# Patient Record
Sex: FEMALE | Race: OTHER | HISPANIC OR LATINO | Employment: UNEMPLOYED | URBAN - METROPOLITAN AREA
[De-identification: names, ages, dates, MRNs, and addresses within clinical notes are randomized per-mention and may not be internally consistent; named-entity substitution may affect disease eponyms.]

---

## 2019-10-16 ENCOUNTER — TELEPHONE (OUTPATIENT)
Dept: NEUROLOGY | Facility: CLINIC | Age: 29
End: 2019-10-16

## 2019-10-16 NOTE — TELEPHONE ENCOUNTER
Called patient and left message regarding her appointment with Dr Scarlett Anaya  Asked patient to return my call if she has seen a previous neurologist to retrieve records from  Asked patient to arrive 15 minutes early to appointment, bring new patient questionnaire, and list of medications

## 2019-10-22 ENCOUNTER — OFFICE VISIT (OUTPATIENT)
Dept: NEUROLOGY | Facility: CLINIC | Age: 29
End: 2019-10-22

## 2019-10-22 VITALS — HEART RATE: 94 BPM | SYSTOLIC BLOOD PRESSURE: 123 MMHG | WEIGHT: 198 LBS | DIASTOLIC BLOOD PRESSURE: 76 MMHG

## 2019-10-22 DIAGNOSIS — G40.919 INTRACTABLE EPILEPSY WITHOUT STATUS EPILEPTICUS, UNSPECIFIED EPILEPSY TYPE (HCC): Primary | ICD-10-CM

## 2019-10-22 DIAGNOSIS — M54.2 NECK PAIN: ICD-10-CM

## 2019-10-22 PROBLEM — H54.61 VISION LOSS, RIGHT EYE: Status: ACTIVE | Noted: 2019-06-27

## 2019-10-22 PROBLEM — H54.61 VISION LOSS, RIGHT EYE: Status: RESOLVED | Noted: 2019-06-27 | Resolved: 2019-10-22

## 2019-10-22 PROBLEM — R00.2 PALPITATIONS: Status: ACTIVE | Noted: 2019-06-27

## 2019-10-22 PROCEDURE — 99354 PR PROLONGED SVC OUTPATIENT SETTING 1ST HOUR: CPT | Performed by: PSYCHIATRY & NEUROLOGY

## 2019-10-22 PROCEDURE — 99205 OFFICE O/P NEW HI 60 MIN: CPT | Performed by: PSYCHIATRY & NEUROLOGY

## 2019-10-22 RX ORDER — FOLIC ACID 1 MG/1
1 TABLET ORAL DAILY
COMMUNITY
Start: 2016-09-22 | End: 2019-10-22 | Stop reason: SDUPTHER

## 2019-10-22 RX ORDER — CARBAMAZEPINE 200 MG/1
400 TABLET ORAL 2 TIMES DAILY
COMMUNITY
Start: 2019-06-27 | End: 2019-10-22

## 2019-10-22 RX ORDER — PHENOBARBITAL 97.2 MG/1
97.2 TABLET ORAL
Qty: 30 TABLET | Refills: 5 | Status: SHIPPED | OUTPATIENT
Start: 2019-10-22 | End: 2020-03-10 | Stop reason: SDUPTHER

## 2019-10-22 RX ORDER — LAMOTRIGINE 100 MG/1
TABLET ORAL
Qty: 60 TABLET | Refills: 5 | Status: SHIPPED | OUTPATIENT
Start: 2019-10-22 | End: 2019-11-21 | Stop reason: SDUPTHER

## 2019-10-22 RX ORDER — LAMOTRIGINE 25 MG/1
TABLET ORAL
Qty: 182 TABLET | Refills: 0 | Status: SHIPPED | OUTPATIENT
Start: 2019-10-22 | End: 2019-11-21 | Stop reason: SDUPTHER

## 2019-10-22 RX ORDER — PHENOBARBITAL 30 MG/1
90 TABLET ORAL
COMMUNITY
Start: 2019-06-27 | End: 2019-10-22

## 2019-10-22 RX ORDER — FOLIC ACID 1 MG/1
1 TABLET ORAL DAILY
Qty: 30 TABLET | Refills: 11 | Status: SHIPPED | OUTPATIENT
Start: 2019-10-22 | End: 2020-03-10 | Stop reason: SDUPTHER

## 2019-10-22 RX ORDER — CARBAMAZEPINE 400 MG/1
800 TABLET, EXTENDED RELEASE ORAL 2 TIMES DAILY
Qty: 120 TABLET | Refills: 5 | Status: SHIPPED | OUTPATIENT
Start: 2019-10-22 | End: 2020-03-10 | Stop reason: SDUPTHER

## 2019-10-22 NOTE — PATIENT INSTRUCTIONS
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; discussed lamotrigine (Lamictal)  I reviewed side effects of Lamotrigine, which include, but not limited to, drug rash, Jacob Sinks syndrome, liver toxicity, mood swings, irritability, suicidal ideation, abnormal liver enzymes, medication interactions, ataxia, incoordination, cognitive impairment, headaches and insomnia  PLAN:  1 - continue with carbamazepine 200mg tab take 4 tabs twice a day until no more, refill is for Carbamazepine XR 400mg tabs take TWO tabs twice a day  2- continue with phenobarbital 30mg tab take 3 tabs at bedtime until no more, refill is for phenobarbital 97 2mg tab take ONE tab at bedtime  3 - start lamotrigine titration  Starting with lamotrigine 25mg tabs  Week 1 and 2 - take 1 tab daily  Week 3 and 4 - take 2 tabs daily  Week 5 and 6 - take 2 tabs twice a day  Week 7 and 8 - take 3 tabs twice a day  Week 9 and afterwards - start taking lamotrigine 100mg tab one tab twice a day  Around Week 10 go for blood work to check lamotrigine drug level, carbamazepine, phenobarbital, CBC, CMP    4 - routine EEG study  5 - MRI brain w/wo contrast seizure protocol 3T scanner  6 - follow-up in 3 months    May consider admission to the EMU for seizure characterization and depending on the EEG, pre-surgical evaluation  Possible future options of vagal nerve stimulator    FIRST AID FOR SEIZURES    What to Do If You Witness a Seizure:     Generalized convulsive (called a generalized tonic-clonic or grand mal seizure)  During this seizure, the person may cry out, suddenly stiffen up, make jerking movements, and fall  The person may turn pale or blue from difficulty breathing  Actions:  1  Stay calm  Talk in a soothing voice and if possible keep onlookers away  2  Prevent injury  Move objects away that the person might hit while jerking uncontrollably  3  Time when the seizure starts and ends   Most seizures stop after only a few minutes  4  Turn him or her gently onto one side  This will help keep the airway clear  5  Never place anything in his/her mouth or give him/her anything by mouth during a seizure  -- Do not give the person water, pills, or food until fully alert  6  Loosen tight clothing or jewelry around his/her neck  7  Make the person as comfortable as possible  8  Place something soft under their head  9  Do not hold the person down  If the person having a seizure thrashes around there is no need for you to restrain them  They are more likely to be combative if restrained  Remember to consider your safety as well  10  Keep onlookers away  11  Be sensitive and supportive, and ask others to do the same  12  Stay with the person until he/she is fully alert  Complex partial seizure (confusional spells)  During this kind of seizure, the person may have a glassy stare; give no response or inappropriate responses when questioned; sit, stand, or walk about aimlessly; make lip smacking or chewing motions; fidget with clothes; appear to be drunk, drugged, or confused  Actions:  1  Make sure the person is safe and wont harm themselves  2  Try to remove harmful objects from around the person (tools, utensils, glasses)  3  Do NOT be aggressive or attempt to restrain the person  They are more likely to be combative if restrained  Remember to consider your safety as well  4  Help prevent the person from wandering, and direct the person to chair or safe position  5  Never place anything in his/her mouth or give him/her anything by mouth during a seizure  -- Do not give the person water, pills, or food until fully alert  6  Keep onlookers away  7  Be sensitive and supportive, and ask others to do the same  8  Stay with the person until he/she is fully alert  CALL 911 if:  1  A convulsive seizure lasts more than 5 minutes  2  The person turns blue during the seizure    3  The person does not start breathing after the seizure  Begin mouth to mouth resuscitation if this would occur  4  The person has one seizure right after the other without coming back to normal consciousness between the seizures  5  The person has not regained consciousness or is still confused after 30 minutes  6  You know the person does not have epilepsy  7  You know the person has diabetes or low blood sugar  8  The person is pregnant, ill, or injured  9  The seizure occurred in water, because the person may have inhaled water  10  The person requests an ambulance or medical help  Rescue medication  Your doctor may prescribe a rescue medication such as lorazepam (Ativan), diazepam (Valium / Diastat), or clonazepam (Klonopin) to terminate a seizure or if you have a history of cluster of seizures   Follow the instructions given by your doctor for these medications    Recognizing Common Seizures (examples)   · Simple partial seizures: Isolated twitching, numbness, sweating, dizziness, nausea/vomiting, disturbances to hearing, vision, smell or taste  No loss of consciousness occurs, and the person remains aware of his/her environment  · Complex partial seizures: Staring, motionless, picking at clothes, smacking lips, swallowing repeatedly or wandering around  The person is not aware of their surroundings and is not fully responsive  · Atonic seizures: Drop attacks or sudden, rapid fall to ground with rapid recovery  · Myoclonic seizures: Brief forceful jerks which can affect the whole body or just part of it  · Absence seizures: May appear to be daydreaming or spacing out   The person is momentarily unresponsive and unaware of what is happening around him/her  · Tonic seizures: Stiffening of part or of the entire body  · Generalized Tonic-Clonic Seizures  Grand-mal seizure   Sudden loss of consciousness with body stiffening followed by continuous jerking movements   A blue tinge around the mouth is likely but lack of oxygen is rare  Loss of bladder and/or bowel control may occur  SEIZURE SAFETY    Dont let fear of seizures keep you at home  Be smart about your activities to make sure you are safe  The guidelines below can help you be as safe as possible  Make sure the people around you are aware of:   What happens when you have a seizure   Correct seizure first aid   First aid for choking (consider taking a basic life support class)   When they should know to call 911 or for medical help    Avoid common triggers of seizures:   Missing your medications   Not getting enough sleep   Drinking alcohol   Using illegal drugs    Safety measures for at home:  In the bathroom:    Do not take baths in the bathtub  Instead, take only showers  Try to have a family member available while you are in the shower   Make sure the drain in the bathtub/shower is working properly to avoid pooling of water   You can consider a fitted shower seat  Recessed soap trays can minimize injury if you would happen to fall in the shower   Bathroom doors can be hung to open outwards, so that the door can still be opened if you fall against the door   Do not lock the bathroom door  Use an Occupied sign on the door or other signal to let others know you are in the bathroom  o Safety locks can be obtained from the [facility]   On your water heater, set your water temperature to a warm temperature that is not scalding to avoid burns from very hot water   Put non-skid strips/ in the bathtub   Use an electric shaver   Avoid any electrical appliances (including electric shaver) in the bathroom or near water   Use shatterproof glass for mirrors  In the kitchen:    When possible, cook using a microwave   Only cook or use electrical appliances when someone else is in the house and available   As much as possible, grill food and avoid fryers or frying food     Use the back burners of the stove and turn the pot handles toward the back of the stove   Avoid carrying hot pans, pots of boiling water, or very hot food  Serve food or liquids directly from the stove  At the least, minimize the distance hot food is carried   Use precut foods or food processers to limit the need to use knives   Use plastic or durable cups, dishes, and containers instead of breakable glass items  In the bedroom   Low level and wide beds (like a futon) can reduce risk of injury of falling out of bed  If there is a high risk of falling out of bed, you can consider simply putting the mattress on the floor   Avoid sleeping on top bunks of bunk beds   Place a soft carpet on the floor  Around the house   Pad sharp corners of tables, chairs, etc  Round tables and furniture can be considered to avoid sharp corners   Avoid open flames  Place a screen in front of fireplaces and dont build a fire alone   Allow for open spaces with furniture   Avoid loose throw rugs or slippery floors  Non-slip abimbola or cushioned abimbola can help reduce injury from a fall   Fireproof fabrics and furniture are best, and especially important if you smoke   Doors and windows with safety glass are safer if someone falls against them   Avoid lights and heaters that could easily be knocked over   Use curling irons or clothing irons that have automatic shut off switches   Make sure motor driven equipment or lawn mowers have dead mans handles or seats so they will turn off if you release pressure  Safety measures when away from home  [address] law mandates that you cannot drive for 6 months after your most recent seizure   [place] law mandates that you cannot drive for 6 months after your most recent seizure  Work/Travel   Wear a medical alert bracelet or necklace at all times   Wear a helmet and use protective clothing/equipment when appropriate     Consider telling co-workers and travel companions that you have epilepsy and what to do if you have a seizure   Avoid irregular shifts or disruptions of a regular sleep pattern   Take your medications on time and keep an updated list of medications in your purse or wallet   Do not climb to heights or operate heavy machinery   Stand back from the edges of roads or bus/train platforms when traveling   If you wander when you have a seizure, take a friend along for the trip  Recreation  [employer] can be dangerous  Do not swim alone, in open water, or in murky water that you cannot see the bottom  o Caregivers should be with you in the pool at all times and must be physically able to get you out of the water   Use a flotation device   Scuba diving is not recommended since during a seizure people are unaware of their surroundings  o Having a seizure underwater can be deadly and can endanger the lives of others   Kayaking and canoeing can be especially dangerous  o People with epilepsy are at a higher risk of becoming trapped underneath a canoe or kayak   Wear a helmet when playing contact sports, biking, or if there is a risk of falling  o Patients with epilepsy are at a higher risk of head injury when playing contact sports   Avoid riding a bike, running, or other activities around busy roads, steep hills, or secluded areas   Exercise on soft surfaces   [employer] [place]: many people with epilepsy can go on rides depending on their type of seizures  o For some people, stress or excitement can trigger a seizure  o Rollercoasters (especially if you are upside-down) are more dangerous for people with epilepsy  Medications   Take your medications on time  If you have trouble remembering, set alarms on your phone  You can visit www  [email or web address]  com to set up reminders through text message to help you remember to take your medications   Use a pillbox to help you keep track of your medications     When out of the house, take any needed medications with you  Consider keeping one or two extra doses with you in case you are unexpectedly away from home longer than planned   If you realize you missed a dose of your medications and it is less than 2 hours until your next dose, skip the missed dose  Do not double up your medication dose  If it is more than 2 hours until your next dose, you can take the missed dose   Avoid drinking alcohol, since this can enhance effects of your seizure medications   Be aware of common and major side effects of your medications   Keep your medications out of the reach of children  Parenting:  Destini Gamez your home as much as possible   It is possible that you could drop your baby if you have a seizure while holding or feeding them   If you are nursing a baby, sit on the floor or bed with your back supported so the baby will not fall far if you should lose consciousness   Feed the baby while he or she is seated in an infant seat   Dress, change, and sponge bathe the baby on the floor   Move the baby around in a stroller or small crib   Keep a young baby in a playpen when you are alone, and a toddler in an indoor play yard, or childproof one room and use safety sandoval at the doors   When out of the house, use a bungee-type cord or restraint harness so your child cannot wander away if you have a seizure that affects your awareness

## 2019-10-22 NOTE — PROGRESS NOTES
Review of Systems    {[id number]}      Review of Systems   Constitutional: Negative  Negative for appetite change and fever  HENT: Negative  Negative for hearing loss, tinnitus, trouble swallowing and voice change  Eyes: Negative  Negative for photophobia and pain  Respiratory: Negative  Negative for shortness of breath  Cardiovascular: Negative  Negative for palpitations  Gastrointestinal: Negative  Negative for nausea and vomiting  Endocrine: Negative  Negative for cold intolerance and heat intolerance  Genitourinary: Negative  Negative for dysuria, frequency and urgency  Musculoskeletal: Negative  Negative for myalgias and neck pain  Skin: Negative  Negative for rash  Neurological: Positive for seizures and headaches  Negative for dizziness, tremors, syncope, facial asymmetry, speech difficulty, weakness, light-headedness and numbness  Hematological: Negative  Does not bruise/bleed easily  Psychiatric/Behavioral: Positive for confusion (after seizures)  Negative for hallucinations and sleep disturbance

## 2019-10-22 NOTE — PROGRESS NOTES
[address] [facility]  Patient's Name: Orly Monge   Patient's : 1990   Visit Type: new patient  Referring MD / PCP:  No primary care provider on file  Assessment:  Ms Orly Monge is a 34 y o  woman with intractable epilepsy, as she has failed more than 2 AEDs and continues to have seizures  Her MRI brain study from a couple of years ago did not identify a focal lesion to cause her seizures  Her EEG reports are somewhat confusing, as there were both features of generalized epileptiform discharges and focal discharges  It may be possible that the focal discharges were fragments of the generalized discharges or that her seizure focus is frontal and around that area it is difficult to differentiate from generalized discharges  I reviewed the diagnosis of epilepsy with the patient and her sister, that further testing is needed such as re-imaging of the brain for focal lesions with a 3T MRI scanner (which may help identify subtle cortical dysplasia)  I will request a routine EEG study  But, it may not be as informative as to localizing the source of her seizures  To better determine if her clinical seizures are focal onset or generalized onset seizures, an inpatient video EEG monitoring study (EMU) may be needed  This would help guide further treatment recommendations  I reviewed that intractable epilepsy means that chances of seizure freedom with medication trials become less than 10%  We may need to consider alternative therapy such as neuromodulation (VNS, RNS, DBS) or if we are able to determine a seizure focus, then epilepsy surgery  However, with the finding of generalized epileptiform discharges epilepsy surgery is not a possibility  At this time, I did recommend that she try another antiepileptic medication, starting with lamotrigine, which is a broad spectrum anti-epileptic medication    In contrast carbamazepine is for focal epilepsy and may worsen generalized epilepsy  Once she is at an adequate dose of lamotrigine, we will wean her off of carbamazepine  I reviewed side effects of lamotrigien, which include, but not limited to, drug rash, Hosea Feather syndrome, liver toxicity, mood swings, irritability, suicidal ideation, abnormal liver enzymes, medication interactions, ataxia, incoordination, cognitive impairment, headaches and insomnia  Her sister asked about patient's employment  I will refer the patient to our  for assistance in finding community resources for employment  With Ms Perlita Jade's type of seizures when she has altered awareness and drops things from her hands, working in a [employer] or [employer]  may not be an ideal place for her safety  She will need to be in a more structured environment  I also suspect that there is come intellectual disability as part of her epilepsy, she will need a place that is willing to tolerate some slow cognitive function  She needs to find a place of employment where she is not carrying fragile objects for long distances, using motorized equipment, or use a ladder/heights  Plan:   1 - continue with carbamazepine 200mg tab take 4 tabs twice a day until no more, refill is for Carbamazepine XR 400mg tabs take TWO tabs twice a day  2- continue with phenobarbital 30mg tab take 3 tabs at bedtime until no more, refill is for phenobarbital 97 2mg tab take ONE tab at bedtime  3 - start lamotrigine titration  Starting with lamotrigine 25mg tabs  Week 1 and 2 - take 1 tab daily  Week 3 and 4 - take 2 tabs daily  Week 5 and 6 - take 2 tabs twice a day  Week 7 and 8 - take 3 tabs twice a day  Week 9 and afterwards - start taking lamotrigine 100mg tab one tab twice a day      Around Week 10 go for blood work to check lamotrigine drug level, carbamazepine, phenobarbital, CBC, CMP    4 - routine EEG study  5 - MRI brain w/wo contrast seizure protocol 3T scanner  6 - follow-up in 3 months    May consider admission to the EMU for seizure characterization and depending on the EEG, pre-surgical evaluation  Possible future options of vagal nerve stimulator  Will need to address vitamin D, osteopenia, and pregnancy concerns at the next office visit  Problem List Items Addressed This Visit        Nervous and Auditory    Intractable epilepsy without status epilepticus ([facility] [address] ) - Primary    Relevant Medications    folic acid (FOLVITE) 1 mg tablet    PHENobarbital (LUMINAL) 97 2 MG tablet    carBAMazepine (TEGretol XR) 400 mg 12 hr tablet    lamoTRIgine (LaMICtal) 25 mg tablet    lamoTRIgine (LaMICtal) 100 mg tablet    Other Relevant Orders    EEG awake or drowsy routine    MRI brain seizure wo and w contrast    Lamotrigine level    Phenobarbital level    Carbamazepine level, total    CBC    Comprehensive metabolic panel       Other    Neck pain          Chief Complaint:   Chief Complaint   Patient presents with    Seizures      HPI:      Perlita Bowles is a 34 y o  right handed female here for new patient evaluation of intractable epilepsy  Intake History 10/22/2019  She was previously seen by Dr Francine Mock at [facility]  From Dr Sandoval Pineda office note:  Seizure type 1 - behavioral arrest, tremoulous, unresponsive, amnestic to seizure, ?masticatory movements, blowing air type of sounds  Seizure type 2 - generalized tonic clonic seizure out of sleep  Patient is from the [facility]  She was diagnosed with seizures at an early age in childhood; initially there were staring episodes; mother was unable to report name of medications that were tried  She had generalized convulsions starting at the age of 25  She was initially on Tegretol  Her last visit with Dr Francine Mock was on 6/27/2019  She continued to have "a few attacks"  After a seizure she will have a headache      Patient's history: obtained using  by telephone [phone] is here with her sister  I started to have Perlita give her own history regarding seizures but she was struggling with the telephone  (not sure how to respond to questions)  She looked to her sister for information  Her sister reports that Ivan started to have seizures about 11 years ago (age 15-16, not during early childhood)  Ivan has no recollection of when she has a seizure  She did not have seizures as a baby  When she was a child she was "slow" ( used "hidden condition")  When she was born, there was a problem during birth, the doctor was pushing or pulling during the delivery and there was something in the brain that got "disconnected"  Her sister reports that Perlita seizures involve Perlita appearing paralyzed, unable to speak, with hand tremors, rigid and stiff, and forgets what she is doing  If she has something in her hands she just drops it  This seizure can be about a minute and may repeat 3 times within 5 minutes  Ivan has a warning of making a blowing sound with her mouth  Her last seizure was last night  She has seizures about a couple of times a week  There was one seizure that caused her to a convulsion (this happened 7 years ago)  She started a new job last week, she could not work because she had a seizure there  She could not hold a job because she had seizures at work  AED/side effects/compliance:  Carbamazepine 800-800  Phenobarbital 90mg at bedtime    Event/Seizure semiology:  1  She starts to make a blowing sound, rigid, behavioral arrest, unable to speak, hand tremors, drops things from hands, then she cannot remember what happened (amnestic to seizure)  2  One lifetime convulsion in 2012?     Woman of childbearing age with Epilepsy:  Contraception: not sexually active  Folic acid supplement:  No    Prior Epilepsy History:  x    Special Features  Status epilepticus: No  Self Injury Seizures: No  Precipitating Factors: None  Post-ictal state: amnestic, confusion    Epilepsy Risk Factors:  Abnormal pregnancy: No  Abnormal birth/: No  Abnormal Development: No  Febrile seizures, simple: No  Febrile seizures, complex: No  CNS infection: No  Mental retardation: No  Cerebral palsy: No  Head injury (moderate/severe): No  CNS neoplasm: No  CNS malformation: No  Neurosurgical procedure: No  Stroke: No  Alcohol abuse: No  Drug abuse: No  Family history Sz/epilepsy: No    Prior AEDs:  medication Max dose Time used Reason to stop   Levetiracetam   ?behavioral problem   phenobarbital      carbamazepine      oxcarbazepine              Prior workup:  x  Imagin2016  MRI brain - [facility]  Normal MR of brain     EEGs:  2015 - [facility]  Multifocal independent spike-slow waves with phase reversal over the left temporal region T3, T5, Fp1, Fp2, F3 along with generalized spike-slow waves at 1 Hz runs    There are small sharp waves in the bilateral frontal region    -2016 - [facility]  48 hours ambulatory EEG  Generalized spike and waves and polyspike and waves  There is an electrographic seizure but no symptoms were reported  2016 - 11:17 - generalized bifrontal spike-wave complexes with generalized 7-8 Hz activity    Labs:  2018  CBC 4 3/14 2/42/345  /4 8/111/24/9/0 5/79  LFT 7 3/3 6/0 2/16/21/134    General exam   /76 (BP Location: Right arm, Patient Position: Sitting, Cuff Size: Large)   Pulse 94   Wt 89 8 kg (198 lb)    Appearance: normally developed, appears well; but she has difficulty understanding  questions on the phone; her sister frequently had to speak for Perlita  Carotids: no bruits present  Cardiovascular: regular rate and rhythm and normal heart sounds  Pulmonary: clear to auscultation  Abdominal: obese, nontender, nondistended    HEENT: anicteric and moist mucus membranes / oral cavity   Fundoscopy: unable to see the posterior elements of the orbit    Mental status  Orientation: alert and oriented to name,  2019, [employer] [employer], unable to say the name of this this place, 2nd floor  Fund of Knowledge: [place] Porter, concrete answers - what does your parents do? "Work"; generic answers to parents type of employment   Attention and Concentration: [address]; able to name the months backwards  Current and Remote Memory:recalled 2/3 words after five minutes  Language: difficulty with naming parts of a shirt, comprehension is intact, repetition is intact and naming is intact    Cranial Nerves  CN 1: not tested  CN 2: Visual fields intact to confrontation and pupils equal round reactive to direct and consenual light   CN 3, 4, 6: EOMI, no nystagmus  CN 5:sensation intact to all distriubtion V1, V2, V3  CN 7:muscles of facial expression are symmetric  CN 8:symmetric to finger rubs bilaterally  CN 9, 10:symmetric elevation of soft palate and uvula is midline  CN 11:symmetric strength of sternocleidomastoid and trapezius muscles  CN 12:tongue is midline    Motor:  Bulk, Tone: normal bulk, normal tone  Pronation: no pronator drift  Strength: Patient has full strength symmetrically of shoulder abduction, biceps, triceps, wrist flexion, wrist extension, finger flexion, finger abduction, hip flexion, knee flexion, knee extension, dorsiflexion, plantar flexion  Sensory:  Lighttouch: intact in all limbs  Romberg:normal    Coordination:  FNF:FNF bilaterally intact  PAULINO:intact  FFM:intact  Gait/Station:normal gait and normal tandem gait    Reflexes:  reflexes are normal and symmetric    Past Medical/Surgical History:  Patient Active Problem List   Diagnosis    Body mass index (BMI) of 33 0 to 33 9 in adult    Dysthymic disorder    Neck pain    Palpitations    Intractable epilepsy without status epilepticus ([facility] [address] )     Past Medical History:   Diagnosis Date    Seizures ([facility] [address] )      History reviewed  No pertinent surgical history      Past Psychiatric History:  Depression: Yes  Anxiety: No  Psychosis: No    Medications:    Current Outpatient Medications:     folic acid (FOLVITE) 1 mg tablet, Take 1 tablet (1 mg total) by mouth daily, Disp: 30 tablet, Rfl: 11    carBAMazepine (TEGretol XR) 400 mg 12 hr tablet, Take 2 tablets (800 mg total) by mouth 2 (two) times a day, Disp: 120 tablet, Rfl: 5    lamoTRIgine (LaMICtal) 100 mg tablet, After titration with 25mg tabs, take one 100mg tab twice a day, Disp: 60 tablet, Rfl: 5    lamoTRIgine (LaMICtal) 25 mg tablet, Week 1 & 2 take 1 tab daily, Week 3 & 4 take 2 tabs daily, then follow MD's titration instructions, Disp: 182 tablet, Rfl: 0    PHENobarbital (LUMINAL) 97 2 MG tablet, Take 1 tablet (97 2 mg total) by mouth daily at bedtime, Disp: 30 tablet, Rfl: 5    Allergies:  No Known Allergies    Family history:  Family History   Problem Relation Age of Onset    Hypertension Maternal Grandmother     Colon polyps Mother     Glaucoma Mother     Asthma Sister      There is no family history of seizure, epilepsy or developmental delay  Social History  Living situation:  Lives with family  Work:  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, 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  Driving:  No   reports that she has never smoked  She has never used smokeless tobacco  She reports that she does not drink alcohol or use drugs  Review of Systems  A review of at least 12 organ/systems was obtained by the medical assistant and reviewed by me, including additional positives/negatives:  Neurological: Positive for seizures and headaches  Negative for dizziness, tremors, syncope, facial asymmetry, speech difficulty, weakness, light-headedness and numbness  Psychiatric/Behavioral: Positive for confusion (after seizures)  Negative for hallucinations and sleep disturbance       Decision making was of high-complexity due to the patient's high risk condition (seizures), psychiatric and neuropsychological comorbidities, behavioral problems, memory and cognitive problems and medication side effects  Prolonged service  I spent an additional 30 minutes to address issues of management of epilepsy/loss of consciousness, psychiatric comorbidities, medication instructions, and monitoring (adverse effects, side effects, and risk of antiepileptic medications)    Started at 9:57AM   Ended at 10:27AM

## 2019-10-23 ENCOUNTER — TELEPHONE (OUTPATIENT)
Dept: NEUROLOGY | Facility: CLINIC | Age: 29
End: 2019-10-23

## 2019-10-23 NOTE — TELEPHONE ENCOUNTER
----- Message from Misty Jarvis MD sent at 10/22/2019  5:23 PM EDT -----  Regarding: employment for patients with seizures  Can you help this patient out regarding how to find appropriate type of employment such as referring to [employer]? She has frequent complex partial type of seizures, only one convulsion in her lifetime  She was trying to work at [employer], which is not an appropriate place for her  I tried to suggest options of lower risk type of jobs that does not involve lifting objects over head, not carrying things over a long distance, no motorized equipment, no ladders, and less traffic  Options I suggested are repetitive type of work, correction, cleaning, guarding  She does not speak [place] and I think she has some mild intellectual disability, but she reports that she completed high school

## 2019-10-28 ENCOUNTER — DOCUMENTATION (OUTPATIENT)
Dept: NEUROLOGY | Facility: CLINIC | Age: 29
End: 2019-10-28

## 2019-11-08 ENCOUNTER — HOSPITAL ENCOUNTER (OUTPATIENT)
Dept: RADIOLOGY | Age: 29
Discharge: HOME | End: 2019-11-08

## 2019-11-08 DIAGNOSIS — G40.919 INTRACTABLE EPILEPSY WITHOUT STATUS EPILEPTICUS, UNSPECIFIED EPILEPSY TYPE (HCC): ICD-10-CM

## 2019-11-08 PROCEDURE — A9585 GADOBUTROL INJECTION: HCPCS | Performed by: PSYCHIATRY & NEUROLOGY

## 2019-11-08 PROCEDURE — 70553 MRI BRAIN STEM W/O & W/DYE: CPT

## 2019-11-08 RX ADMIN — GADOBUTROL 9 ML: 604.72 INJECTION INTRAVENOUS at 09:28

## 2019-11-11 DIAGNOSIS — G40.919 INTRACTABLE EPILEPSY WITHOUT STATUS EPILEPTICUS, UNSPECIFIED EPILEPSY TYPE (HCC): ICD-10-CM

## 2019-11-11 NOTE — TELEPHONE ENCOUNTER
Called and spoke to patient's sister  She has no questions or concerns regarding the MRI Brain results  She would like to know if you could resend [facility] [address] [facility] instead of [facility] because [facility] believes [facility]  Please advise

## 2019-11-11 NOTE — TELEPHONE ENCOUNTER
----- Message from Shane Rust MD sent at 11/11/2019  1:28 PM EST -----  MRI brain study is normal, no developmental abnormality, injury, or tumor to cause seizures is found  May let the patient know

## 2019-11-11 NOTE — TELEPHONE ENCOUNTER
Does she need scripts for both lamotrigine 25mg and 100mg tabs? Did she start the lamotrigine 25mg tab titration?

## 2019-11-19 NOTE — TELEPHONE ENCOUNTER
MSW attempted to reach patient via the Kita Patterson,  # [phone]  No answer   left message requesting callback  Awaiting same  MSW did phone [facility] at [phone] to inquire if they had any [place] brochures or information on their services online  MSW was told that there are no [place] resources available online, but that they can mail patients applications and brochures directly that are in [place]

## 2019-11-20 NOTE — TELEPHONE ENCOUNTER
MSW attempted to reach patient via the Kaia Craig,  # [phone]  No answer   left message requesting callback  Awaiting same

## 2019-11-20 NOTE — TELEPHONE ENCOUNTER
MSW received callback from patient's sister, Azra Valente (who is listed on patient's Communication Consent)  MSW conferenced in the [facility], and communicated via  # [phone]  MSW explained services offered by the [facility] ([facility]) to patient's sister - that this agency helps persons with disabilities prepare for, obtain, or maintain employment  MSW advised that patients are assigned to an vocational rehabilitation counselor who works with the individual to help them find gainful, meaningful employment  MSW explained that services may include diagnostic services, vocational evaluation, counseling, training, placements assistance, assistive technology, support services, etc     Patient's sister reported that patient is Tongan speaking  MSW did advise that [facility] would still be able to help her  MSW advised that this writer could request [facility] to mail patient a [place] brochure about their services and a [place] application  Patient's sister agreeable to have above mailed to her sister's address  Address verified  MSW did advise that if patient was interested in [address] [facility] that she would need to complete the application and submit it to [facility] in order to be assigned to a vocational rehab counselor  MSW phoned the [facility] office of [employer] [facility] at [phone] and spoke with Akhil Sorensen stated that she would mail patient a [place] brochure and application  Patient's name and address provided  Akhil Sorensen stated that she would place the documents in the mail to patient this date  MSW will be available to patient/sister as needed

## 2019-11-21 RX ORDER — LAMOTRIGINE 25 MG/1
TABLET ORAL
Qty: 182 TABLET | Refills: 0 | Status: SHIPPED | OUTPATIENT
Start: 2019-11-21 | End: 2020-03-10 | Stop reason: SDUPTHER

## 2019-11-21 RX ORDER — LAMOTRIGINE 100 MG/1
TABLET ORAL
Qty: 60 TABLET | Refills: 5 | Status: SHIPPED | OUTPATIENT
Start: 2019-11-21 | End: 2020-03-10 | Stop reason: SDUPTHER

## 2019-11-21 NOTE — TELEPHONE ENCOUNTER
Patient's sister calling back  She states patient has not started lamotrigine titration yet  She states she would like both [facility]  Please review and sign, thanks!

## 2020-02-04 ENCOUNTER — TELEPHONE (OUTPATIENT)
Dept: NEUROLOGY | Facility: CLINIC | Age: 30
End: 2020-02-04

## 2020-02-04 NOTE — TELEPHONE ENCOUNTER
Called patient regarding missing blood work results  Asked patient to complete her blood work prior to appointment with Dr Adela Rausch  Advised to call back if she did complete it elsewhere to request results and if she has any questions

## 2020-02-06 ENCOUNTER — TELEPHONE (OUTPATIENT)
Dept: NEUROLOGY | Facility: CLINIC | Age: 30
End: 2020-02-06

## 2020-02-06 NOTE — TELEPHONE ENCOUNTER
2/6/2020 AT 9:34AM SPOKE W/RYAN  VIOLET ( # [id number]) LMOM FOR PT TO CALL BACK W/NEW INS   INFORMATION- NO INS CURRENTLY LISTED IS EPIC FOR PT

## 2020-03-05 ENCOUNTER — HOSPITAL ENCOUNTER (OUTPATIENT)
Dept: NEUROLOGY | Facility: CLINIC | Age: 30
Discharge: HOME | End: 2020-03-05
Payer: COMMERCIAL

## 2020-03-05 DIAGNOSIS — G40.919 INTRACTABLE EPILEPSY WITHOUT STATUS EPILEPTICUS, UNSPECIFIED EPILEPSY TYPE (HCC): ICD-10-CM

## 2020-03-05 PROCEDURE — 95816 EEG AWAKE AND DROWSY: CPT

## 2020-03-05 PROCEDURE — 95819 EEG AWAKE AND ASLEEP: CPT | Performed by: PSYCHIATRY & NEUROLOGY

## 2020-03-06 ENCOUNTER — TELEPHONE (OUTPATIENT)
Dept: NEUROLOGY | Facility: CLINIC | Age: 30
End: 2020-03-06

## 2020-03-06 NOTE — TELEPHONE ENCOUNTER
----- Message from Margaret Hinson MD sent at 3/6/2020 10:19 AM EST -----  May inform the patient's caretakers that her EEG study shows abnormality in the left temporal and left frontal region that may indicate an area of her brain that is causing her to have seizures  There was no structural abnormality on the MRI brain study to indicate what is the actual cause  Since the last office visit, how often has Dois Prime been having seizures?

## 2020-03-06 NOTE — TELEPHONE ENCOUNTER
Spoke with patient's sister Billie Hood and made her aware  She asked that we call caregiver Caroline ([phone]) to confirm seizure frequency  Billie Hood states she knows the patient had a seizure 3 days ago  Left message for Caroline to return the call

## 2020-03-10 ENCOUNTER — APPOINTMENT (OUTPATIENT)
Dept: LAB | Facility: HOSPITAL | Age: 30
End: 2020-03-10
Attending: PSYCHIATRY & NEUROLOGY
Payer: COMMERCIAL

## 2020-03-10 ENCOUNTER — OFFICE VISIT (OUTPATIENT)
Dept: NEUROLOGY | Facility: CLINIC | Age: 30
End: 2020-03-10
Payer: COMMERCIAL

## 2020-03-10 ENCOUNTER — TELEPHONE (OUTPATIENT)
Dept: NEUROLOGY | Facility: CLINIC | Age: 30
End: 2020-03-10

## 2020-03-10 VITALS — SYSTOLIC BLOOD PRESSURE: 135 MMHG | WEIGHT: 206 LBS | HEART RATE: 97 BPM | DIASTOLIC BLOOD PRESSURE: 91 MMHG

## 2020-03-10 DIAGNOSIS — M83.5 ANTICONVULSANT DRUG-INDUCED OSTEOMALACIA: ICD-10-CM

## 2020-03-10 DIAGNOSIS — G40.919 INTRACTABLE EPILEPSY WITHOUT STATUS EPILEPTICUS, UNSPECIFIED EPILEPSY TYPE (HCC): ICD-10-CM

## 2020-03-10 DIAGNOSIS — T42.75XA ANTICONVULSANT DRUG-INDUCED OSTEOMALACIA: ICD-10-CM

## 2020-03-10 DIAGNOSIS — G40.919 INTRACTABLE EPILEPSY WITHOUT STATUS EPILEPTICUS, UNSPECIFIED EPILEPSY TYPE (HCC): Primary | ICD-10-CM

## 2020-03-10 LAB
ALBUMIN SERPL BCP-MCNC: 3.5 G/DL (ref 3.5–5)
ALP SERPL-CCNC: 111 U/L (ref 46–116)
ALT SERPL W P-5'-P-CCNC: 24 U/L (ref 12–78)
ANION GAP SERPL CALCULATED.3IONS-SCNC: 7 MMOL/L (ref 4–13)
AST SERPL W P-5'-P-CCNC: 16 U/L (ref 5–45)
BILIRUB SERPL-MCNC: 0.24 MG/DL (ref 0.2–1)
BUN SERPL-MCNC: 6 MG/DL (ref 5–25)
CALCIUM SERPL-MCNC: 8.6 MG/DL (ref 8.3–10.1)
CARBAMAZEPINE SERPL-MCNC: 8.5 UG/ML (ref 4–12)
CHLORIDE SERPL-SCNC: 104 MMOL/L (ref 100–108)
CO2 SERPL-SCNC: 27 MMOL/L (ref 21–32)
CREAT SERPL-MCNC: 0.56 MG/DL (ref 0.6–1.3)
ERYTHROCYTE [DISTWIDTH] IN BLOOD BY AUTOMATED COUNT: 12.4 % (ref 11.6–15.1)
GFR SERPL CREATININE-BSD FRML MDRD: 128 ML/MIN/1.73SQ M
GLUCOSE P FAST SERPL-MCNC: 90 MG/DL (ref 65–99)
HCT VFR BLD AUTO: 42.7 % (ref 34.8–46.1)
HGB BLD-MCNC: 13.7 G/DL (ref 11.5–15.4)
MCH RBC QN AUTO: 30.7 PG (ref 26.8–34.3)
MCHC RBC AUTO-ENTMCNC: 32.1 G/DL (ref 31.4–37.4)
MCV RBC AUTO: 96 FL (ref 82–98)
PHENOBARB SERPL-MCNC: 17.4 UG/ML (ref 15–40)
PLATELET # BLD AUTO: 317 THOUSANDS/UL (ref 149–390)
PMV BLD AUTO: 9.4 FL (ref 8.9–12.7)
POTASSIUM SERPL-SCNC: 3.9 MMOL/L (ref 3.5–5.3)
PROT SERPL-MCNC: 7.3 G/DL (ref 6.4–8.2)
RBC # BLD AUTO: 4.46 MILLION/UL (ref 3.81–5.12)
SODIUM SERPL-SCNC: 138 MMOL/L (ref 136–145)
WBC # BLD AUTO: 3.92 THOUSAND/UL (ref 4.31–10.16)

## 2020-03-10 PROCEDURE — 80156 ASSAY CARBAMAZEPINE TOTAL: CPT

## 2020-03-10 PROCEDURE — 80053 COMPREHEN METABOLIC PANEL: CPT

## 2020-03-10 PROCEDURE — 80175 DRUG SCREEN QUAN LAMOTRIGINE: CPT

## 2020-03-10 PROCEDURE — 85027 COMPLETE CBC AUTOMATED: CPT

## 2020-03-10 PROCEDURE — 36415 COLL VENOUS BLD VENIPUNCTURE: CPT

## 2020-03-10 PROCEDURE — 80184 ASSAY OF PHENOBARBITAL: CPT

## 2020-03-10 PROCEDURE — 99215 OFFICE O/P EST HI 40 MIN: CPT | Performed by: PSYCHIATRY & NEUROLOGY

## 2020-03-10 RX ORDER — LAMOTRIGINE 25 MG/1
TABLET ORAL
Qty: 182 TABLET | Refills: 0 | Status: SHIPPED | OUTPATIENT
Start: 2020-03-10 | End: 2020-09-21

## 2020-03-10 RX ORDER — LAMOTRIGINE 100 MG/1
TABLET ORAL
Qty: 60 TABLET | Refills: 5 | Status: SHIPPED | OUTPATIENT
Start: 2020-03-10 | End: 2020-09-21

## 2020-03-10 RX ORDER — FOLIC ACID 1 MG/1
1 TABLET ORAL DAILY
Qty: 30 TABLET | Refills: 11 | Status: SHIPPED | OUTPATIENT
Start: 2020-03-10 | End: 2020-12-21 | Stop reason: SDUPTHER

## 2020-03-10 RX ORDER — PHENOBARBITAL 97.2 MG/1
97.2 TABLET ORAL
Qty: 30 TABLET | Refills: 5 | Status: SHIPPED | OUTPATIENT
Start: 2020-03-10 | End: 2020-09-21 | Stop reason: SDUPTHER

## 2020-03-10 RX ORDER — CARBAMAZEPINE 400 MG/1
800 TABLET, EXTENDED RELEASE ORAL 2 TIMES DAILY
Qty: 120 TABLET | Refills: 5 | Status: SHIPPED | OUTPATIENT
Start: 2020-03-10 | End: 2020-09-21

## 2020-03-10 NOTE — PATIENT INSTRUCTIONS
At this time, I did recommend that she try another antiepileptic medication, starting with lamotrigine, which is a broad spectrum anti-epileptic medication  In contrast carbamazepine is for focal epilepsy and may worsen generalized epilepsy  Once she is at an adequate dose of lamotrigine, we will wean her off of carbamazepine  I reviewed side effects of lamotrigien, which include, but not limited to, drug rash, Dorisann Rode syndrome, liver toxicity, mood swings, irritability, suicidal ideation, abnormal liver enzymes, medication interactions, ataxia, incoordination, cognitive impairment, headaches and insomnia  Plan:   1 - continue  Carbamazepine XR 400mg tabs take TWO tabs twice a day  2- continue phenobarbital 97 2mg tab take ONE tab at bedtime  3 - start lamotrigine titration  Starting with lamotrigine 25mg tabs  Week 1 and 2 - take 1 tab daily  Week 3 and 4 - take 2 tabs daily  Week 5 and 6 - take 2 tabs twice a day  Week 7 and 8 - take 3 tabs twice a day  Week 9 and afterwards - start taking lamotrigine 100mg tab one tab twice a day  Around Week 10 go for blood work to check lamotrigine drug level, vitamin D level, CBC, LFT (about one week before the next visit)    4 - 48 hours ambulatory EEG study  5 - follow-up in 3 months with Advanced Practitioner and Dr Kami Hayward in 6 months    [facility] let fear of seizures keep you at home  Be smart about your activities to make sure you are safe  The guidelines below can help you be as safe as possible      Make sure the people around you are aware of:   What happens when you have a seizure   Correct seizure first aid   First aid for choking (consider taking a basic life support class)   When they should know to call 911 or for medical help    Avoid common triggers of seizures:   Missing your medications   Not getting enough sleep   Drinking alcohol   Using illegal drugs    Safety measures for at home:  In the bathroom:    Do not take baths in the bathtub  Instead, take only showers  Try to have a family member available while you are in the shower   Make sure the drain in the bathtub/shower is working properly to avoid pooling of water   You can consider a fitted shower seat  Recessed soap trays can minimize injury if you would happen to fall in the shower   Bathroom doors can be hung to open outwards, so that the door can still be opened if you fall against the door   Do not lock the bathroom door  Use an Occupied sign on the door or other signal to let others know you are in the bathroom  o Safety locks can be obtained from the [facility]   On your water heater, set your water temperature to a warm temperature that is not scalding to avoid burns from very hot water   Put non-skid strips/ in the bathtub   Use an electric shaver   Avoid any electrical appliances (including electric shaver) in the bathroom or near water   Use shatterproof glass for mirrors  In the kitchen:    When possible, cook using a microwave   Only cook or use electrical appliances when someone else is in the house and available   As much as possible, grill food and avoid fryers or frying food   Use the back burners of the stove and turn the pot handles toward the back of the stove   Avoid carrying hot pans, pots of boiling water, or very hot food  Serve food or liquids directly from the stove  At the least, minimize the distance hot food is carried   Use precut foods or food processers to limit the need to use knives   Use plastic or durable cups, dishes, and containers instead of breakable glass items  In the bedroom   Low level and wide beds (like a futon) can reduce risk of injury of falling out of bed  If there is a high risk of falling out of bed, you can consider simply putting the mattress on the floor   Avoid sleeping on top bunks of bunk beds   Place a soft carpet on the floor      Around the house   Pad sharp corners of tables, chairs, etc  Round tables and furniture can be considered to avoid sharp corners   Avoid open flames  Place a screen in front of fireplaces and dont build a fire alone   Allow for open spaces with furniture   Avoid loose throw rugs or slippery floors  Non-slip abimbola or cushioned abimbola can help reduce injury from a fall   Fireproof fabrics and furniture are best, and especially important if you smoke   Doors and windows with safety glass are safer if someone falls against them   Avoid lights and heaters that could easily be knocked over   Use curling irons or clothing irons that have automatic shut off switches   Make sure motor driven equipment or lawn mowers have dead mans handles or seats so they will turn off if you release pressure  Safety measures when away from home  [address] law mandates that you cannot drive for 6 months after your most recent seizure   [place] law mandates that you cannot drive for 6 months after your most recent seizure  Work/Travel   Wear a medical alert bracelet or necklace at all times   Wear a helmet and use protective clothing/equipment when appropriate   Consider telling co-workers and travel companions that you have epilepsy and what to do if you have a seizure   Avoid irregular shifts or disruptions of a regular sleep pattern   Take your medications on time and keep an updated list of medications in your purse or wallet   Do not climb to heights or operate heavy machinery   Stand back from the edges of roads or bus/train platforms when traveling   If you wander when you have a seizure, take a friend along for the trip  Recreation  [employer] can be dangerous  Do not swim alone, in open water, or in murky water that you cannot see the bottom  o Caregivers should be with you in the pool at all times and must be physically able to get you out of the water     Use a flotation device   Scuba diving is not recommended since during a seizure people are unaware of their surroundings  o Having a seizure underwater can be deadly and can endanger the lives of others   Kayaking and canoeing can be especially dangerous  o People with epilepsy are at a higher risk of becoming trapped underneath a canoe or kayak   Wear a helmet when playing contact sports, biking, or if there is a risk of falling  o Patients with epilepsy are at a higher risk of head injury when playing contact sports   Avoid riding a bike, running, or other activities around busy roads, steep hills, or secluded areas   Exercise on soft surfaces   Theme [place]: many people with epilepsy can go on rides depending on their type of seizures  o For some people, stress or excitement can trigger a seizure  o Rollercoasters (especially if you are upside-down) are more dangerous for people with epilepsy  Medications   Take your medications on time  If you have trouble remembering, set alarms on your phone  You can visit www  gwbzlqw4riwvqvm  com to set up reminders through text message to help you remember to take your medications   Use a pillbox to help you keep track of your medications   When out of the house, take any needed medications with you  Consider keeping one or two extra doses with you in case you are unexpectedly away from home longer than planned   If you realize you missed a dose of your medications and it is less than 2 hours until your next dose, skip the missed dose  Do not double up your medication dose  If it is more than 2 hours until your next dose, you can take the missed dose   Avoid drinking alcohol, since this can enhance effects of your seizure medications   Be aware of common and major side effects of your medications   Keep your medications out of the reach of children  Parenting:  [place] your home as much as possible     It is possible that you could drop your baby if you have a seizure while holding or feeding them   If you are nursing a baby, sit on the floor or bed with your back supported so the baby will not fall far if you should lose consciousness   Feed the baby while he or she is seated in an infant seat   Dress, change, and sponge bathe the baby on the floor   Move the baby around in a stroller or small crib   Keep a young baby in a playpen when you are alone, and a toddler in an indoor play yard, or childproof one room and use safety sandoval at the doors   When out of the house, use a bungee-type cord or restraint harness so your child cannot wander away if you have a seizure that affects your awareness

## 2020-03-10 NOTE — TELEPHONE ENCOUNTER
Called patient and spoke to patient's sister yesterday offering a sooner appointment  Sister and patient agreed for today's opening in Dr Tony Bell schedule at 1:30 pm  Sister advised that she will take patient to a [facility]'s lab to get her blood work done prior to today's appointment

## 2020-03-10 NOTE — PROGRESS NOTES
[address] [facility]  Patient's Name: Sheridan Winslow   Patient's : 1990   Visit Type: follow-up  Referring MD / PCP:  No primary care provider on file  Assessment:  Ms Sheridan Winslow is a 34 y o  woman with intractable epilepsy, prior EEG study suggested both features of focal (left temporal or hemisphere and generalized epileptiform discharges), but her MRI brain study is without structural pathology  Based on her most recent EEG study, it seems that the "bifrontal" triphasic waves may have a greater field over the left frontotemporal region, which is more consistent with a focal onset epilepsy syndrome  Ms Sheridan Winslow did not start the lamotrigine as I had recommended from the prior visit  Her seizure frequency is possibly variable due to how well the family monitors her seizure frequency  Mario People has no ability to recognize when she has a seizure as she is amnestic to them  I recommend that she completes a 48 hours ambulatory EEG study to determine if she is having more frequent subclinical seizures than she is able to report  It is difficult to determine if she is a candidate for VNS or neuromodulatory therapy as her seizure frequency needs to be more than 2 seizures/month  I reviewed the side effects of lamotrigine  She may have more side effects with clumsiness or shakiness with combination of lamotrigine and carbamazepine  If she was to have more medication side effects, I would try to reduce the dose of carbamazepine so that she can tolerate a higher dose of lamotrigine      Plan:   1 - continue  Carbamazepine XR 400mg tabs take TWO tabs twice a day  2- continue phenobarbital 97 2mg tab take ONE tab at bedtime  3 - start lamotrigine titration  Starting with lamotrigine 25mg tabs  Week 1 and 2 - take 1 tab daily  Week 3 and 4 - take 2 tabs daily  Week 5 and 6 - take 2 tabs twice a day  Week 7 and 8 - take 3 tabs twice a day  Week 9 and afterwards - start taking lamotrigine 100mg tab one tab twice a day  Around Week 10 go for blood work to check lamotrigine drug level, vitamin D level, CBC, LFT    4 - 48 hours ambulatory EEG study  5 - follow-up in 3 months with Advanced Practitioner and Dr Alexander Rodriguez in 6 months      Problem List Items Addressed This Visit        Nervous and Auditory    Intractable epilepsy without status epilepticus ([facility] [address] ) - Primary    Relevant Medications    carBAMazepine (TEGretol XR) 400 mg 12 hr tablet    folic acid (FOLVITE) 1 mg tablet    lamoTRIgine (LaMICtal) 100 mg tablet    lamoTRIgine (LaMICtal) 25 mg tablet    PHENobarbital (LUMINAL) 97 2 MG tablet    Other Relevant Orders    Lamotrigine level    Hepatic function panel    CBC and Platelet    Ambulatory EEG 48 Hours       Musculoskeletal and Integument    Anticonvulsant drug-induced osteomalacia    Relevant Orders    Vitamin D 25 hydroxy          Chief Complaint:   Chief Complaint   Patient presents with    Seizures      HPI:    Perlita Rahman is a 34 y o  right handed female here for follow-up evaluation of intractable epilepsy  Interval History 3/10/2020  Her sister, Brennon Roper, thought that Jovani Turner may have had a seizure in January, her aunt reported the seizure  Caroline was more certain that Jovani Turner had a seizure on 3/3/2020, which lasted 5-7 minutes, standing, appears like she zoned out, then resumed cooking  She is not taking lamotrigine because she did  the prescription for lamotrigine (there was a phone call to send prescription to [facility], but they actually prefer using [facility])  Prior to the last visit with me, she would have about 8 seizures a month  Caroline thinks that Perlita may have been on carbamazepine longer than phenobarbital     AED/side effects/compliance:  Carbamazepine 800-800  Phenobarbital 90mg at bedtime    Event/Seizure semiology:  1   She starts to make a blowing sound, rigid, behavioral arrest, unable to speak, hand tremors, drops things from hands, then she cannot remember what happened (amnestic to seizure)  2  One lifetime convulsion in 2012? Woman of childbearing age with Epilepsy:  Contraception: not sexually active  Folic acid supplement:  No    Prior Epilepsy History:  Intake History 10/22/2019  She was previously seen by Dr Mike Capps at [facility]  From Dr Raynette Hatchet office note:  Seizure type 1 - behavioral arrest, tremoulous, unresponsive, amnestic to seizure, ?masticatory movements, blowing air type of sounds  Seizure type 2 - generalized tonic clonic seizure out of sleep  Patient is from the [facility]  She was diagnosed with seizures at an early age in childhood; initially there were staring episodes; mother was unable to report name of medications that were tried  She had generalized convulsions starting at the age of 25  She was initially on Tegretol  Her last visit with Dr Mike Capps was on 6/27/2019  She continued to have "a few attacks"  After a seizure she will have a headache  Patient's history: obtained using  by telephone [phone] is here with her sister  I started to have Perlita give her own history regarding seizures but she was struggling with the telephone  (not sure how to respond to questions)  She looked to her sister for information  Her sister reports that Ivan started to have seizures about 11 years ago (age 15-16, not during early childhood)  Ivan has no recollection of when she has a seizure  She did not have seizures as a baby  When she was a child she was "slow" ( used "hidden condition")  When she was born, there was a problem during birth, the doctor was pushing or pulling during the delivery and there was something in the brain that got "disconnected"  Her sister reports that Perlita seizures involve Perlita appearing paralyzed, unable to speak, with hand tremors, rigid and stiff, and forgets what she is doing    If she has something in her hands she just drops it  This seizure can be about a minute and may repeat 3 times within 5 minutes  Didier Kidd has a warning of making a blowing sound with her mouth  Her last seizure was last night  She has seizures about a couple of times a week  There was one seizure that caused her to go into a convulsion (this happened 7 years ago)  She started a new job last week, she could not work because she had a seizure there  She could not hold a job because she had seizures at work  Special Features  Status epilepticus: No  Self Injury Seizures: No  Precipitating Factors: None  Post-ictal state: amnestic, confusion    Epilepsy Risk Factors:  Abnormal pregnancy: No  Abnormal birth/: No  Abnormal Development: No  Febrile seizures, simple: No  Febrile seizures, complex: No  CNS infection: No  Mental retardation: No  Cerebral palsy: No  Head injury (moderate/severe): No  CNS neoplasm: No  CNS malformation: No  Neurosurgical procedure: No  Stroke: No  Alcohol abuse: No  Drug abuse: No  Family history Sz/epilepsy: No    Prior AEDs:  medication Max dose Time used Reason to stop   Levetiracetam   ?behavioral problem   phenobarbital      carbamazepine      oxcarbazepine              Prior workup:  I have reviewed the MRI brain study myself  [id number]  MRI brain - [facility]  Normal MR of brain     2019  MRI brain w/wo  Normal MRI brain study -  No pathology was identified in the left frontal or temporal regions  EEGs:  2015 [facility]   Multifocal independent spike-slow waves with phase reversal over the left temporal region T3, T5, Fp1, Fp2, F3 along with generalized spike-slow waves at 1 Hz runs    There are small sharp waves in the bilateral frontal region    -2016 - [facility]  48 hours ambulatory EEG  Generalized spike and waves and polyspike and waves  There is an electrographic seizure but no symptoms were reported  2016 - 11:17 - generalized bifrontal spike-wave complexes with generalized 7-8 Hz activity    3/5/2020  Poorly organized theta/delta slowing and sharply contoured waveforms on the left temporal area  There seems to bifrontal triphasic waves; but on my interpretation of the EEG there are left frontotemporal spike-slow waves      Labs:  4/20/2018  CBC 4 3/14 2/42/345  /4 8/111/24/9/0 5/79  LFT 7 3/3 6/0 2/16/21/134    General exam   /91 (BP Location: Right arm, Patient Position: Sitting, Cuff Size: Large)   Pulse 97   Wt 93 4 kg (206 lb)    Appearance: normally developed, appears well; but she has difficulty understanding  questions on the phone; her sister frequently had to speak for Perlita  Carotids: n/a  Cardiovascular: regular rate and rhythm and normal heart sounds  Pulmonary: clear to auscultation  Abdominal: obese, nontender, nondistended    HEENT: anicteric and moist mucus membranes / oral cavity   Fundoscopy: unable to see the posterior elements of the orbit    Mental status  Orientation: alert and oriented to name, place, time  Fund of Knowledge: difficult to determine as her primary language is English, she defers questions to her sister and limited   Attention and Concentration: intact  Current and Remote Memory:intact  Language: no aphasia and spontaneous speech is normal    Cranial Nerves  CN 1: not tested  CN 2: pupils equal round reactive to direct and consenual light   CN 3, 4, 6: EOMI, no nystagmus  CN 5:not assessed  CN 7:muscles of facial expression are symmetric  CN 8:not assessed  CN 9, 10:no dysarthria present  CN 11:symmetric shoulder shrug  CN 12:tongue is midline    Motor:  Bulk, Tone: normal bulk, normal tone  Pronation: not assessed  Strength: Symmetric strength of the arms and legs, no lateralizing weakness     Sensory:  Lighttouch: intact in all limbs  Romberg:not assessed    Coordination:  FNF:FNF bilaterally intact  PAULINO:intact  FFM:intact  Gait/Station:normal gait    Reflexes:  Not assessed    Past Medical/Surgical History:  Patient Active Problem List   Diagnosis    Body mass index (BMI) of 33 0 to 33 9 in adult    Dysthymic disorder    Neck pain    Palpitations    Intractable epilepsy without status epilepticus ([facility] )    Anticonvulsant drug-induced osteomalacia     Past Medical History:   Diagnosis Date    Seizures ([facility] )      History reviewed  No pertinent surgical history  Past Psychiatric History:  Depression: Yes  Anxiety: No  Psychosis: No    Medications:    Current Outpatient Medications:     carBAMazepine (TEGretol XR) 400 mg 12 hr tablet, Take 2 tablets (800 mg total) by mouth 2 (two) times a day, Disp: 120 tablet, Rfl: 5    folic acid (FOLVITE) 1 mg tablet, Take 1 tablet (1 mg total) by mouth daily, Disp: 30 tablet, Rfl: 11    PHENobarbital (LUMINAL) 97 2 MG tablet, Take 1 tablet (97 2 mg total) by mouth daily at bedtime, Disp: 30 tablet, Rfl: 5    lamoTRIgine (LaMICtal) 100 mg tablet, After titration with 25mg tabs, take one 100mg tab twice a day, Disp: 60 tablet, Rfl: 5    lamoTRIgine (LaMICtal) 25 mg tablet, Week 1 & 2 take 1 tab daily, Week 3 & 4 take 2 tabs daily, then follow MD's titration instructions, Disp: 182 tablet, Rfl: 0    Allergies:  No Known Allergies    Family history:  Family History   Problem Relation Age of Onset    Hypertension Maternal Grandmother     Colon polyps Mother     Glaucoma Mother     Asthma Sister      There is no family history of seizure, epilepsy or developmental delay  Social History  Living situation:  Lives with family  Work:  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, 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  Driving:  No   reports that she has never smoked  She has never used smokeless tobacco  She reports that she does not drink alcohol or use drugs      Review of Systems  A review of at least 12 organ/systems was obtained by the medical assistant and reviewed by me, including additional positives/negatives:  A review of at least 12 organ/systems was evaluated and there are no complaints  Decision making was of high-complexity due to the patient's high risk condition (seizures), psychiatric and neuropsychological comorbidities, behavioral problems, memory and cognitive problems and medication side effects  The total amount of time spent with the patient was 41 minutes  More than 50% of this time was devoted to counseling and coordination of care  Issues addressed during this clinic visit included overall management, medication counseling or monitoring (including adverse effects, side effects and risks of antiepileptic medications)     Start time: 2PM  End time: 2:41PM

## 2020-03-10 NOTE — PROGRESS NOTES
Review of Systems    {LimROS-complete:71622}      Review of Systems   Constitutional: Negative  Negative for appetite change and fever  HENT: Negative  Negative for hearing loss, tinnitus, trouble swallowing and voice change  Eyes: Negative  Negative for photophobia and pain  Respiratory: Negative  Negative for shortness of breath  Cardiovascular: Negative  Negative for palpitations  Gastrointestinal: Negative  Negative for nausea and vomiting  Endocrine: Negative  Negative for cold intolerance and heat intolerance  Genitourinary: Negative  Negative for dysuria, frequency and urgency  Musculoskeletal: Negative  Negative for myalgias and neck pain  Skin: Negative  Negative for rash  Neurological: Negative  Negative for dizziness, tremors, seizures, syncope, facial asymmetry, speech difficulty, weakness, light-headedness, numbness and headaches  Hematological: Negative  Does not bruise/bleed easily  Psychiatric/Behavioral: Negative  Negative for confusion, hallucinations and sleep disturbance

## 2020-03-12 LAB — LAMOTRIGINE SERPL-MCNC: NORMAL UG/ML (ref 2–20)

## 2020-03-26 ENCOUNTER — TELEPHONE (OUTPATIENT)
Dept: NEUROLOGY | Facility: CLINIC | Age: 30
End: 2020-03-26

## 2020-03-26 NOTE — TELEPHONE ENCOUNTER
----- Message from Shivani De La Torre MD sent at 3/25/2020  3:28 PM EDT -----  Regarding: FW: No Show  Please find out why the patient did not show up to her 48 hours ambulatory EEG study  ----- Message -----  From: Kasey Boateng  Sent: 3/25/2020   2:52 PM EDT  To: Shivani De La Torre MD  Subject: No Show                                          Pt was a no show for her 48hrs ambulatory eeg at [facility] today

## 2020-03-26 NOTE — TELEPHONE ENCOUNTER
Called patient and left message requesting a call back in regards to her now show to her EEG study appointment

## 2020-08-10 ENCOUNTER — TELEPHONE (OUTPATIENT)
Dept: NEUROLOGY | Facility: CLINIC | Age: 30
End: 2020-08-10

## 2020-08-10 NOTE — TELEPHONE ENCOUNTER
Patient calling in asking what she needs to have done prior to next appt  I reviewed chart and last office note  Made her aware that she should have blood work done (she will go to [facility] lab) and should have EEG  Nothing needed from our office at this time

## 2020-09-09 ENCOUNTER — HOSPITAL ENCOUNTER (OUTPATIENT)
Dept: NEUROLOGY | Facility: CLINIC | Age: 30
Discharge: HOME | End: 2020-09-09

## 2020-09-09 DIAGNOSIS — G40.919 INTRACTABLE EPILEPSY WITHOUT STATUS EPILEPTICUS, UNSPECIFIED EPILEPSY TYPE (HCC): ICD-10-CM

## 2020-09-10 ENCOUNTER — HOSPITAL ENCOUNTER (OUTPATIENT)
Dept: NEUROLOGY | Facility: CLINIC | Age: 30
Discharge: HOME | End: 2020-09-10
Payer: COMMERCIAL

## 2020-09-10 DIAGNOSIS — G40.919 INTRACTABLE EPILEPSY WITHOUT STATUS EPILEPTICUS, UNSPECIFIED EPILEPSY TYPE (HCC): ICD-10-CM

## 2020-09-10 PROCEDURE — 95708 EEG WO VID EA 12-26HR UNMNTR: CPT

## 2020-09-11 ENCOUNTER — HOSPITAL ENCOUNTER (OUTPATIENT)
Dept: NEUROLOGY | Facility: CLINIC | Age: 30
Discharge: HOME | End: 2020-09-11
Payer: COMMERCIAL

## 2020-09-11 DIAGNOSIS — G40.919 INTRACTABLE EPILEPSY WITHOUT STATUS EPILEPTICUS, UNSPECIFIED EPILEPSY TYPE (HCC): ICD-10-CM

## 2020-09-11 PROCEDURE — 95708 EEG WO VID EA 12-26HR UNMNTR: CPT

## 2020-09-11 PROCEDURE — 95719 EEG PHYS/QHP EA INCR W/O VID: CPT | Performed by: PSYCHIATRY & NEUROLOGY

## 2020-09-14 PROCEDURE — 95719 EEG PHYS/QHP EA INCR W/O VID: CPT | Performed by: PSYCHIATRY & NEUROLOGY

## 2020-09-16 ENCOUNTER — TELEPHONE (OUTPATIENT)
Dept: NEUROLOGY | Facility: CLINIC | Age: 30
End: 2020-09-16

## 2020-09-16 NOTE — TELEPHONE ENCOUNTER
Called patient and LM to confirm her 9/21 appt with Dr Dillan Meyer  Reminded that patient will receive another call to confirm her appointment

## 2020-09-17 ENCOUNTER — TELEPHONE (OUTPATIENT)
Dept: NEUROLOGY | Facility: CLINIC | Age: 30
End: 2020-09-17

## 2020-09-21 ENCOUNTER — OFFICE VISIT (OUTPATIENT)
Dept: NEUROLOGY | Facility: CLINIC | Age: 30
End: 2020-09-21
Payer: COMMERCIAL

## 2020-09-21 VITALS
DIASTOLIC BLOOD PRESSURE: 88 MMHG | WEIGHT: 214 LBS | TEMPERATURE: 97.2 F | HEART RATE: 74 BPM | SYSTOLIC BLOOD PRESSURE: 137 MMHG

## 2020-09-21 DIAGNOSIS — G40.919 INTRACTABLE EPILEPSY WITHOUT STATUS EPILEPTICUS, UNSPECIFIED EPILEPSY TYPE (HCC): Primary | ICD-10-CM

## 2020-09-21 DIAGNOSIS — M83.5 ANTICONVULSANT DRUG-INDUCED OSTEOMALACIA: ICD-10-CM

## 2020-09-21 DIAGNOSIS — F79 INTELLECTUAL DISABILITY: ICD-10-CM

## 2020-09-21 DIAGNOSIS — T42.75XA ANTICONVULSANT DRUG-INDUCED OSTEOMALACIA: ICD-10-CM

## 2020-09-21 PROCEDURE — 99215 OFFICE O/P EST HI 40 MIN: CPT | Performed by: PSYCHIATRY & NEUROLOGY

## 2020-09-21 RX ORDER — CARBAMAZEPINE 400 MG/1
400 TABLET, EXTENDED RELEASE ORAL 3 TIMES DAILY
Qty: 90 TABLET | Refills: 5 | Status: SHIPPED | OUTPATIENT
Start: 2020-09-21 | End: 2020-12-21 | Stop reason: SDUPTHER

## 2020-09-21 RX ORDER — LAMOTRIGINE 150 MG/1
150 TABLET ORAL 2 TIMES DAILY
Qty: 60 TABLET | Refills: 5 | Status: SHIPPED | OUTPATIENT
Start: 2020-09-21 | End: 2020-12-21 | Stop reason: SDUPTHER

## 2020-09-21 RX ORDER — PHENOBARBITAL 97.2 MG/1
97.2 TABLET ORAL
Qty: 30 TABLET | Refills: 5 | Status: SHIPPED | OUTPATIENT
Start: 2020-09-21 | End: 2021-03-22 | Stop reason: SDUPTHER

## 2020-09-21 NOTE — PATIENT INSTRUCTIONS
Plan:   1 - Change Carbamazepine (Tegretol) ER to 400mg tab one tab three times a day  2 - continue phenobarbital 97 2mg tab take ONE tab at bedtime  3 - increase lamotrigine to one 100mg in AM and one 150mg in PM until no more 100mg tab then one 150mg twice a day  4 - in two weeks go for blood work to check carbamazepine, phenobarbital and lamotrigine drug level, vitamin D level, CBC, CMP  5 - if you still have seizures will consider inpatient video EEG monitoring study for presurgical evaluation  6 - follow-up in 3 months with Advanced Practitioner and Dr Vandana Mccall in 6 months  7 - please visit [email or web address] for Salv[place]orean language discussion about epilepsy treatment options  Video Electroencephalopathy (VEEG) and the Epilepsy Monitoring Unit (EMU)    What is video EEG?  In video-EEG, you are video taped at the same time as your brain waves are recorded   This typically occurs in the hospital over a long period of time, often an average of 3-7 days   The doctor is able to review both the video and EEG at the same time to see exactly what your brain waves are doing while watching what your body is doing  Why do I need video-EEG? Typically the reasons to have a video-EEG study are to make a diagnosis, classify the type of seizures and epilepsy, and if medications do not prevent the seizures then offer an alternative therapy (pre-surgical evaluation)   Some people have events that look like an epileptic seizure (caused by electrical storm of the brain); but are in fact not due to abnormal electrical activity in the brain  Other causes include cardiac or vascular pathology, other neurological causes such as tics and movement disorders, or psychological / psychogenic nonepileptic events   There are also many different kinds of epileptic seizures    The video EEG will help classify the seizures and epilepsy syndrome to determine the best medications or treatments   Nearly a third of epilepsy patients are medically refractory (failed 2 or more appropriate anti-seizure medications) and epilepsy surgery may be an option for these patients  VEEG is used to evaluate where seizures start in the brain, determine if surgery may be possible and guide the specific surgical approach for patients who are found to be surgical candidates  What to expect in the Epilepsy Monitoring Unit (EMU)  The EMU is a specialized inpatient unit in the hospital specially designed for evaluation of seizure disorders  The unit is equipped with computer-based monitoring equipment, certified EEG technologists, trained nurses, and attending physicians trained in the management of epilepsy   In the EMU, seizures are recorded and specially reviewed so that a proper diagnosis can be made and treatment can be optimized   Each patient will have a private room and a private bathroom   Patients will be connected to video-EEG equipment continuously (24 hours a day)  o There is no video recording while in the restroom, but brain waves are recorded on the EEG at all times  o Although this can be bothersome, continuous monitoring at all times is necessary to make sure patients are safe and that the necessary information is collected   Because patients are connected to recording equipment, mobility is restricted to the patients room   Patients are not be able to take a shower or wash your hair while on EEG monitoring  o This would interfere with your EEG electrodes  o Washing with a basin or sink is permitted   Patients are asked to activate a push button when they experience an event and then state aloud what they are experiencing   Nurses will test the patient during an event to help the doctors see what is occurring   To increase the chance of capturing a seizure in a limited amount of time a variety of activation procedures are employed    o Sleep deprivation (no naps during the day and attempts to keep you awake all night as often as every other night)  o Photic stimulation and hyperventilation procedures  o Weaning or withdrawal of medications used to control seizures (the attending physician will discuss this with you)   You will need to have a peripheral IV placed in your hand or arm  This allows the doctors to give rescue medications in the event of a medical emergency while you are in the hospital  You also may be given a blood thinner in the form of daily subcutaneous injections to prevent deep venous thrombosis (DVT)   The hospital is a non-smoking facility, therefore smoking is not allowed   Chewing gum is not allowed, this creates artifact on the EEG   If you are a woman of childbearing age, you may be asked to take a urine pregnancy test    We strive to make the EMU as safe as possible  Throughout the world video EEG monitoring is the standard of care and thousands of patients have been admitted to epilepsy monitoring units and there are rare cases of complications due to severely difficult to control epilepsy  These have included seizure clusters, status epilepticus (seizures that do not stop with typical medications and advanced measures are required), injuries (fractures and head injury), and very rarely death  How long do patients stay in the EMU?  The length of time varies for each patient   Prepare to stay about 1 week (even though it may not last that long)  o Typically, patients stay between 3-7 days, but may stay more or less time in special circumstances  Things you can do to prepare for admission   Take a shower and wash your hair the night before or the morning of admission   Do not use any hair products such as gels, sprays, mousse, or hair weaves    o It is NOT necessary to cut your hair or shave your head for the test     Unless you received specific instructions from your physician, continue to take your medications as you normally do, including the morning of your admission   Bring all of your current medications in the original prescription bottles to the hospital (some specialty medications may not be available in the hospital)   Bring a complete list of your medical and surgical history   Bring your seizure calendar   Wear comfortable clothing or pajamas  o Button-down shirts and elastic-waist pants are preferred    o You can bring slippers or sneakers for when you are walking around the room   You can bring activities like computers, phones, cards, music, movies, etc  with you to keep you occupied  o Electronic devices cannot be plugged in while you are touching them (this interferes with the equipment), but can be charging on the other side of the room   If you need any medical devices (such as a CPAP for obstructive sleep apnea), please bring it to the hospital     Refractory Epilepsy    What is refractory epilepsy?  Refractory epilepsy is when seizures do not become controlled quickly with seizure medications   Seizures in these patients are frequent and severe enough that they are troublesome and interfere with life   Medically refractory epilepsy occurs when a patient continues to have seizures despite reasonable doses of at least 2 seizure medications   Up to 30% of patients have epilepsy that doesnt quickly become controlled by seizure medications, and are considered to have refractory epilepsy  What are the reasons for uncontrolled seizures (refractory epilepsy)?  The diagnosis is wrong   The treatment was wrong: some medicines are not right for some seizure types or high enough doses were not reached   Triggers or lifestyle factors that affect seizure control:  o Missing medications or inadequate doses of medications  o Drugs and alcohol  o Poor sleep   Some properly diagnosed epilepsies are difficult to treat and do not respond well to medications      How do I know if the diagnosis is wrong?  There are a lot of other medical conditions that can cause episodes that look very similar to epileptic seizures  o When seizures arent controlled by seizure medications, one big question is if the events truly are seizures   If this is the case, dDoctors may do additional tests, such as having the patient admitted to the Epilepsy Monitoring Unit (EMU) to capture and better diagnose the episodes   If a patients events are found not to be seizures, the treatment needs to be focused on the true cause of the events  This also can often be better sorted out in the Epilepsy Monitoring Unit (EMU)  What treatments are available for refractory epilepsy?  Seizure medications (also called anti-epileptic drugs or AEDs)  o There are currently more than 20 different seizure medications available to be used  o These can be used individually or in different combinations  o Based on your type of epilepsy, some medications may be more effective, or may not be effective  It is important to discuss this with your doctor   o Every person is different, so one medication may work great for one person, but not work for another, so it is possible that you may need to try several medications before getting the right one(s) for you   Epilepsy Surgery  o This is an option for some types of epilepsy that start in one part of the brain  It is an elective operation, meaning it is up to the choice of the patient to be performedthe patient chooses whether it is performed  o The main purpose is to find the part of the brain where the seizures are startingThe area of the brain where seizures start must be located, and if it is safe, remove that area of the brainthat area of the brain is removed  This is called a resection    o The part of the brain causing seizures can sometimes be hard to find, so multiple different tests are needed to determine if epilepsy surgery is a good option  o The goal of surgery is to make the patient seizure free, not to come off of medications  - All patients stay on epilepsy medications after surgery  - After 2 years, if they are seizure free, about half of patients may try to decrease their medications, but others may choose to stay on medications  o In some patients, epilepsy surgery may be statistically more likely to make them seizure free than continuing to work with medications  - Depending on the type of surgery, rates of seizure freedom can be as high as 70-80%  Each individuals likelihood of seizure freedom is different and needs to be discussed with an epileptologist    o For more information, see the Patient Education handout called Epilepsy Surgery   Vagus Nerve Stimulation (VNS)  o This is an implantable device that is placed in the chest (similar to a pacemaker) that gives electrical impulses to the vagus nerve, which carries those impulses to the brain  o VNS is an option for patients who have not responded to medications and for whom resective epilepsy surgery may not be an option  o This VNS is thought to interrupt seizure pathways and can help to reduce the frequency of seizures  o For more information, see the Patient Education Handout titled Vagus Nerve Stimulation   Ketogenic diet and Modified [place] diet  o These diets can be options for some patients  Like everything else, it may help some people and not help others  o To be done correctly, patients will need special counseling and occasional bloodwork to see if the diet is being effective    o For more information, see the Patient Education Handout titled Ketogenic and Modified [place] diet   Experimental medication trials  o These are medications that are in the process of being tested for effectiveness in patients with epilepsy    o Although these medications are still in the testing phase, for patients who have tried numerous other options, enrolling in these trials allows patients to access to potentially helpful medications before they are on the open market  What are the goals of treating refractory epilepsy?  The goal for treating all patients with epilepsy is: Seizure free and side effect free   Some patients may not be able to become seizure free  , and even if people are having seizures If seizure freedom is not possible,, we work to help them patients live life as normally and as safely as possible  What is the outcome of refractory epilepsy?  Having refractory epilepsy does not mean that it will always be refractory  o Because there isnt any way to know which treatment is right for each person, it may take some time to try different treatments and find the right one (Remember: everyone is different)   Uncontrolled seizures can be risky because of a risk of injury, accidents, memory problems, mood problems, unemployment, and more   Unfortunately serious or life-threatening risks of seizures are real      Working with your Epileptologist is important to minimize any risks and work toward seizure freedom!

## 2020-09-21 NOTE — PROGRESS NOTES
[address] [facility]  Patient's Name: Maria Ellison   Patient's : 1990   Visit Type: follow-up  Referring MD / PCP:  No primary care provider on file  Assessment:  Ms Maria Ellison is a 34 y o  woman with intractable epilepsy, more likely focal onset seizures with altered awareness  She continues to have recurrent seizures despite being on two antiepileptic medications  We have recently introduced lamotrigine, but due to complicated interaction with carbamazepine and phenobarbital, its level is likely still on the low end  I recommend that she increase the dose of lamotrigine along with slight decrease in carbamazepine dose to minimize medication side effects (both are sodium channel blockers)  (Her prior EEG study suggested both features of focal (left temporal or hemisphere and generalized epileptiform discharges); but her MRI brain study is without structural pathology )    She has intellectual disability that makes it difficult for her understand the risk and benefits of possible epilepsy surgery  She would not be able to complete neuropsychological evaluation with our current resources  I did start the conversation that if she continues to have seizures on lamotrigine, she would be classified as intractable and have a low probably in achieving seizure freedom with medication alone  Alternative options will be VNS therapy or evaluation for resective epilepsy surgery  This will require an inpatient video EEG monitor for presurgical evaluation  During this discussion, it is clear that she and her sister do not fully understand the concept of epilepsy surgery  More future visits will be needed to explain the surgical evaluation process  I did try to direct them to look at the [id number] [place] language page of the epilepsy foundation [facility] website that discusses epilepsy therapies      Plan:   1 - Change Carbamazepine (Tegretol) ER to 400mg tab one tab three times a day  2 - continue phenobarbital 97 2mg tab take ONE tab at bedtime  3 - increase lamotrigine to one 100mg in AM and one 150mg in PM until no more 100mg tab then one 150mg twice a day  4 - in two weeks go for blood work to check carbamazepine, phenobarbital and lamotrigine drug level, vitamin D level, CBC, CMP  5 - if you still have seizures will consider inpatient video EEG monitoring study for presurgical evaluation  6 - follow-up in 3 months with Advanced Practitioner and Dr Sobeida Craig in 6 months    7 - please visit [employer]  com/living-epilepsy/epilepsy-and/hispanics/tratamientos for Khmer language discussion about epilepsy treatment options  Problem List Items Addressed This Visit        Nervous and Auditory    Intractable epilepsy without status epilepticus ([facility] [address] ) - Primary    Relevant Medications    carBAMazepine (TEGretol XR) 400 mg 12 hr tablet    PHENobarbital (LUMINAL) 97 2 MG tablet    lamoTRIgine (LaMICtal) 150 MG tablet    Other Relevant Orders    CBC    Comprehensive metabolic panel    Lamotrigine level    Levetiracetam level    Carbamazepine level, total       Musculoskeletal and Integument    Anticonvulsant drug-induced osteomalacia    Relevant Orders    Vitamin D 25 hydroxy       Other    Intellectual disability    Relevant Medications    PHENobarbital (LUMINAL) 97 2 MG tablet          Chief Complaint:   Chief Complaint   Patient presents with    Seizures      HPI:    Perlita Bloom Iris Vasquez is a 34 y o  right handed female here for follow-up evaluation of intractable epilepsy  We had previously tried using a telephone  to be compliant with providing an , but she did not understand the use of the telephone  and kept looking to her sister for clarification  Interval History 9/21/2020  She is here with Caroline, her sister, who provides interpretative service  She feels okay with the addition of lamotrigine    Her sister reports that since March there were 4 seizures, last one was 9/12/2020  These seizures are still consistent with the Type 1, behavioral arrest and unable to speak  There are no worsening headaches, insomnia, or mood disorder (may be mild depression)  There are no new medical issues to report  AED/side effects/compliance:  Carbamazepine 800-800  Phenobarbital 90mg at bedtime  Lamotrigine 100-100    Event/Seizure semiology:  1  She starts to make a blowing sound, rigid, behavioral arrest, unable to speak, hand tremors, drops things from hands, then she cannot remember what happened (amnestic to seizure)  2  One lifetime convulsion in 2012? Woman of childbearing age with Epilepsy:  Contraception: not sexually active  Folic acid supplement:  No    Prior Epilepsy History:  Intake History 10/22/2019  She was previously seen by Dr Nicolasa Olguin at [facility]  From Dr Kenney Bradley office note:  Seizure type 1 - behavioral arrest, tremoulous, unresponsive, amnestic to seizure, ?masticatory movements, blowing air type of sounds  Seizure type 2 - generalized tonic clonic seizure out of sleep  Patient is from the [facility]  She was diagnosed with seizures at an early age in childhood; initially there were staring episodes; mother was unable to report name of medications that were tried  She had generalized convulsions starting at the age of 25  She was initially on Tegretol  Her last visit with Dr Nicolasa Olguin was on 6/27/2019  She continued to have "a few attacks"  After a seizure she will have a headache  Patient's history: obtained using  by telephone [phone] is here with her sister  I started to have Perlita give her own history regarding seizures but she was struggling with the telephone  (not sure how to respond to questions)  She looked to her sister for information  Her sister reports that Ivan started to have seizures about 11 years ago (age 15-16, not during early childhood)    Perlita has no recollection of when she has a seizure  She did not have seizures as a baby  When she was a child she was "slow" ( used "hidden condition")  When she was born, there was a problem during birth, the doctor was pushing or pulling during the delivery and there was something in the brain that got "disconnected"  Her sister reports that Perlita seizures involve Perlita appearing paralyzed, unable to speak, with hand tremors, rigid and stiff, and forgets what she is doing  If she has something in her hands she just drops it  This seizure can be about a minute and may repeat 3 times within 5 minutes  Isael Celeste has a warning of making a blowing sound with her mouth  Her last seizure was last night  She has seizures about a couple of times a week  There was one seizure that caused her to go into a convulsion (this happened 7 years ago)  She started a new job last week, she could not work because she had a seizure there  She could not hold a job because she had seizures at work  Interval History 3/10/2020  -800, PB 90 qHS  Her sister, Ivanna Bagley, thought that Isale Celeste may have had a seizure in January, her aunt reported the seizure  Caroline was more certain that Isael Celeste had a seizure on 3/3/2020, which lasted 5-7 minutes, standing, appears like she zoned out, then resumed cooking  Prior to the last visit with me, she would have about 8 seizures a month      Caroline thinks that Perlita may have been on carbamazepine longer than phenobarbital       Special Features  Status epilepticus: No  Self Injury Seizures: No  Precipitating Factors: None  Post-ictal state: amnestic, confusion    Epilepsy Risk Factors:  Abnormal pregnancy: No  Abnormal birth/: No  Abnormal Development: No  Febrile seizures, simple: No  Febrile seizures, complex: No  CNS infection: No  Mental retardation: No  Cerebral palsy: No  Head injury (moderate/severe): No  CNS neoplasm: No  CNS malformation: No  Neurosurgical procedure: No  Stroke: No  Alcohol abuse: No  Drug abuse: No  Family history Sz/epilepsy: No    Prior AEDs:  medication Max dose Time used Reason to stop   Levetiracetam   ?behavioral problem   phenobarbital      carbamazepine      oxcarbazepine      lamotrigine        Prior workup:  x  Imagin2016  MRI brain - [facility]  Normal MR of brain     2019  MRI brain w/wo  Normal MRI brain study -  No pathology was identified in the left frontal or temporal regions  EEGs:  2015 [facility]   Multifocal independent spike-slow waves with phase reversal over the left temporal region T3, T5, Fp1, Fp2, F3 along with generalized spike-slow waves at 1 Hz runs  There are small sharp waves in the bilateral frontal region    -2016 - [facility]  48 hours ambulatory EEG  Generalized spike and waves and polyspike and waves  There is an electrographic seizure but no symptoms were reported  2016 - 11:17 - generalized bifrontal spike-wave complexes with generalized 7-8 Hz activity    3/5/2020  Poorly organized theta/delta slowing and sharply contoured waveforms on the left temporal area  There seems to bifrontal triphasic waves; but on my interpretation of the EEG there are left frontotemporal spike-slow waves  -2020 - 48 hours ambulatory EEG  Frequent left temporal delta/theta activity with left midtemporal sharp waves  Intermittent delta activity, sharp waves over the left frontal anterior temporal region      Labs:  Component      Latest Ref Rng & Units 3/10/2020   Sodium      136 - 145 mmol/L 138   Potassium      3 5 - 5 3 mmol/L 3 9   Chloride      100 - 108 mmol/L 104   CO2      21 - 32 mmol/L 27   Anion Gap      4 - 13 mmol/L 7   BUN      5 - 25 mg/dL 6   Creatinine      0 60 - 1 30 mg/dL 0 56 (L)   GLUCOSE FASTING      65 - 99 mg/dL 90   Calcium      8 3 - 10 1 mg/dL 8 6   AST      5 - 45 U/L 16   ALT      12 - 78 U/L 24   Alkaline Phosphatase      46 - 116 U/L 111   Total Protein      6 4 - 8 2 g/dL 7 3 Albumin      3 5 - 5 0 g/dL 3 5   TOTAL BILIRUBIN      0 20 - 1 00 mg/dL 0 24   eGFR      ml/min/1 73sq m 128   WBC      4 31 - 10 16 Thousand/uL 3 92 (L)   Red Blood Cell Count      3 81 - 5 12 Million/uL 4 46   Hemoglobin      11 5 - 15 4 g/dL 13 7   HCT      34 8 - 46 1 % 42 7   Platelet Count      871 - 390 Thousands/uL 317   MPV      8 9 - 12 7 fL 9 4   LAMOTRIGINE LEVEL      2 0 - 20 0 ug/mL None Detected   PHENOBARBITAL LEVEL      15 0 - 40 0 ug/mL 17 4   CARBAMAZEPINE LEVEL      4 0 - 12 0 ug/mL 8 5       General exam   /88 (BP Location: Right arm, Patient Position: Sitting, Cuff Size: Large)   Pulse 74   Temp (!) 97 2 °F (36 2 °C)   Wt 97 1 kg (214 lb)    Appearance: normally developed, appears well  Carotids: n/a  Cardiovascular: regular rate and rhythm and normal heart sounds  Pulmonary: clear to auscultation  Abdominal: obese, nontender, nondistended    HEENT: anicteric and neck is supple   Fundoscopy: not assessed    Mental status  Orientation: alert and oriented to name, place, time  Fund of Knowledge: limited ability to comprehend questions   Attention and Concentration: intact  Current and Remote Memory:intact  Language: spontaneous speech is normal and comprehension is intact    Cranial Nerves  CN 1: not tested  CN 2: pupils equal round reactive to direct and consenual light   CN 3, 4, 6: EOMI, no nystagmus  CN 5:not assessed  CN 7:not assessed  CN 8:not assessed  CN 9, 10:no dysarthria present  CN 11:symmetric SCM with head turns  CN 12:not assessed    Motor:  Bulk, Tone: normal bulk, normal tone  Pronation: no pronator drift  Strength: Symmetric strength of the arms and legs, no lateralizing weakness     Sensory:  Lighttouch: intact in all limbs  Romberg:not assessed    Coordination:  FNF:FNF bilaterally intact  PAULINO:intact  FFM:intact  Gait/Station:normal gait    Reflexes:  Not assessed    Past Medical/Surgical History:  Patient Active Problem List   Diagnosis    Body mass index (BMI) of 33 0 to 33 9 in adult    Intellectual disability    Intractable epilepsy without status epilepticus ([facility] [address] )    Anticonvulsant drug-induced osteomalacia     Past Medical History:   Diagnosis Date    Seizures ([facility] [address] )      History reviewed  No pertinent surgical history  Past Psychiatric History:  Depression: Yes  Anxiety: No  Psychosis: No    Medications:    Current Outpatient Medications:     carBAMazepine (TEGretol XR) 400 mg 12 hr tablet, Take 1 tablet (400 mg total) by mouth 3 (three) times a day, Disp: 90 tablet, Rfl: 5    lamoTRIgine (LaMICtal) 150 MG tablet, Take 1 tablet (150 mg total) by mouth 2 (two) times a day, Disp: 60 tablet, Rfl: 5    PHENobarbital (LUMINAL) 97 2 MG tablet, Take 1 tablet (97 2 mg total) by mouth daily at bedtime, Disp: 30 tablet, Rfl: 5    folic acid (FOLVITE) 1 mg tablet, Take 1 tablet (1 mg total) by mouth daily (Patient not taking: Reported on 9/21/2020), Disp: 30 tablet, Rfl: 11    Allergies:  No Known Allergies    Family history:  Family History   Problem Relation Age of Onset    Hypertension Maternal Grandmother     Colon polyps Mother     Glaucoma Mother     Asthma Sister      There is no family history of seizure, epilepsy or developmental delay  Social History  Living situation:  Lives with family  Work:  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, 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  Driving:  No   reports that she has never smoked  She has never used smokeless tobacco  She reports that she does not drink alcohol or use drugs  Review of Systems  A review of at least 12 organ/systems was obtained by the medical assistant and reviewed by me, including additional positives/negatives:  Neurological: Positive for seizures  Decision making was of high-complexity due to the patient's high risk condition (seizures), psychiatric and neuropsychological comorbidities, behavioral problems, memory and cognitive problems and medication side effects

## 2020-09-26 PROBLEM — R00.2 PALPITATIONS: Status: RESOLVED | Noted: 2019-06-27 | Resolved: 2020-09-26

## 2020-10-09 ENCOUNTER — TELEPHONE (OUTPATIENT)
Dept: NEUROLOGY | Facility: CLINIC | Age: 30
End: 2020-10-09

## 2020-12-01 ENCOUNTER — LAB (OUTPATIENT)
Dept: LAB | Facility: CLINIC | Age: 30
End: 2020-12-01
Payer: COMMERCIAL

## 2020-12-01 DIAGNOSIS — M83.5 ANTICONVULSANT DRUG-INDUCED OSTEOMALACIA: ICD-10-CM

## 2020-12-01 DIAGNOSIS — G40.919 INTRACTABLE EPILEPSY WITHOUT STATUS EPILEPTICUS, UNSPECIFIED EPILEPSY TYPE (HCC): ICD-10-CM

## 2020-12-01 DIAGNOSIS — T42.75XA ANTICONVULSANT DRUG-INDUCED OSTEOMALACIA: ICD-10-CM

## 2020-12-01 LAB
25(OH)D3 SERPL-MCNC: 8.3 NG/ML (ref 30–100)
ALBUMIN SERPL BCP-MCNC: 3.7 G/DL (ref 3.5–5)
ALP SERPL-CCNC: 140 U/L (ref 46–116)
ALT SERPL W P-5'-P-CCNC: 30 U/L (ref 12–78)
ANION GAP SERPL CALCULATED.3IONS-SCNC: 3 MMOL/L (ref 4–13)
AST SERPL W P-5'-P-CCNC: 18 U/L (ref 5–45)
BILIRUB SERPL-MCNC: 0.18 MG/DL (ref 0.2–1)
BUN SERPL-MCNC: 12 MG/DL (ref 5–25)
CALCIUM SERPL-MCNC: 8.8 MG/DL (ref 8.3–10.1)
CARBAMAZEPINE SERPL-MCNC: 14.3 UG/ML (ref 4–12)
CHLORIDE SERPL-SCNC: 109 MMOL/L (ref 100–108)
CO2 SERPL-SCNC: 28 MMOL/L (ref 21–32)
CREAT SERPL-MCNC: 0.59 MG/DL (ref 0.6–1.3)
ERYTHROCYTE [DISTWIDTH] IN BLOOD BY AUTOMATED COUNT: 12.6 % (ref 11.6–15.1)
GFR SERPL CREATININE-BSD FRML MDRD: 126 ML/MIN/1.73SQ M
GLUCOSE P FAST SERPL-MCNC: 82 MG/DL (ref 65–99)
HCT VFR BLD AUTO: 44.6 % (ref 34.8–46.1)
HGB BLD-MCNC: 14.2 G/DL (ref 11.5–15.4)
MCH RBC QN AUTO: 30 PG (ref 26.8–34.3)
MCHC RBC AUTO-ENTMCNC: 31.8 G/DL (ref 31.4–37.4)
MCV RBC AUTO: 94 FL (ref 82–98)
PLATELET # BLD AUTO: 357 THOUSANDS/UL (ref 149–390)
PMV BLD AUTO: 9.9 FL (ref 8.9–12.7)
POTASSIUM SERPL-SCNC: 4.1 MMOL/L (ref 3.5–5.3)
PROT SERPL-MCNC: 7.7 G/DL (ref 6.4–8.2)
RBC # BLD AUTO: 4.73 MILLION/UL (ref 3.81–5.12)
SODIUM SERPL-SCNC: 140 MMOL/L (ref 136–145)
WBC # BLD AUTO: 5.48 THOUSAND/UL (ref 4.31–10.16)

## 2020-12-01 PROCEDURE — 82306 VITAMIN D 25 HYDROXY: CPT

## 2020-12-01 PROCEDURE — 85027 COMPLETE CBC AUTOMATED: CPT

## 2020-12-01 PROCEDURE — 80175 DRUG SCREEN QUAN LAMOTRIGINE: CPT

## 2020-12-01 PROCEDURE — 36415 COLL VENOUS BLD VENIPUNCTURE: CPT

## 2020-12-01 PROCEDURE — 80156 ASSAY CARBAMAZEPINE TOTAL: CPT

## 2020-12-01 PROCEDURE — 80177 DRUG SCRN QUAN LEVETIRACETAM: CPT

## 2020-12-01 PROCEDURE — 80053 COMPREHEN METABOLIC PANEL: CPT

## 2020-12-03 LAB
LAMOTRIGINE SERPL-MCNC: 3.9 UG/ML (ref 2–20)
LEVETIRACETAM SERPL-MCNC: <1 UG/ML (ref 10–40)

## 2020-12-09 ENCOUNTER — TELEPHONE (OUTPATIENT)
Dept: NEUROLOGY | Facility: CLINIC | Age: 30
End: 2020-12-09

## 2020-12-16 ENCOUNTER — TELEPHONE (OUTPATIENT)
Dept: NEUROLOGY | Facility: CLINIC | Age: 30
End: 2020-12-16

## 2020-12-21 ENCOUNTER — OFFICE VISIT (OUTPATIENT)
Dept: NEUROLOGY | Facility: CLINIC | Age: 30
End: 2020-12-21
Payer: COMMERCIAL

## 2020-12-21 VITALS
BODY MASS INDEX: 40.67 KG/M2 | SYSTOLIC BLOOD PRESSURE: 122 MMHG | HEIGHT: 62 IN | HEART RATE: 90 BPM | WEIGHT: 221 LBS | RESPIRATION RATE: 16 BRPM | DIASTOLIC BLOOD PRESSURE: 90 MMHG

## 2020-12-21 DIAGNOSIS — E55.9 VITAMIN D DEFICIENCY: ICD-10-CM

## 2020-12-21 DIAGNOSIS — G40.919 INTRACTABLE EPILEPSY WITHOUT STATUS EPILEPTICUS, UNSPECIFIED EPILEPSY TYPE (HCC): Primary | ICD-10-CM

## 2020-12-21 DIAGNOSIS — F79 INTELLECTUAL DISABILITY: ICD-10-CM

## 2020-12-21 PROCEDURE — 99214 OFFICE O/P EST MOD 30 MIN: CPT | Performed by: NURSE PRACTITIONER

## 2020-12-21 RX ORDER — LAMOTRIGINE 150 MG/1
150 TABLET ORAL 2 TIMES DAILY
Qty: 60 TABLET | Refills: 5 | Status: SHIPPED | OUTPATIENT
Start: 2020-12-21 | End: 2021-03-22

## 2020-12-21 RX ORDER — CARBAMAZEPINE 400 MG/1
400 TABLET, EXTENDED RELEASE ORAL 2 TIMES DAILY
Qty: 60 TABLET | Refills: 5 | Status: SHIPPED | OUTPATIENT
Start: 2020-12-21 | End: 2021-03-22

## 2020-12-21 RX ORDER — FOLIC ACID 1 MG/1
1 TABLET ORAL DAILY
Qty: 30 TABLET | Refills: 11 | Status: SHIPPED | OUTPATIENT
Start: 2020-12-21 | End: 2021-09-10

## 2020-12-21 RX ORDER — ERGOCALCIFEROL 1.25 MG/1
50000 CAPSULE ORAL WEEKLY
Qty: 8 CAPSULE | Refills: 0 | Status: SHIPPED | OUTPATIENT
Start: 2020-12-21 | End: 2021-06-24 | Stop reason: SDUPTHER

## 2021-03-17 ENCOUNTER — APPOINTMENT (OUTPATIENT)
Dept: LAB | Facility: HOSPITAL | Age: 31
End: 2021-03-17
Payer: COMMERCIAL

## 2021-03-17 ENCOUNTER — TELEPHONE (OUTPATIENT)
Dept: NEUROLOGY | Facility: CLINIC | Age: 31
End: 2021-03-17

## 2021-03-17 DIAGNOSIS — E55.9 VITAMIN D DEFICIENCY: ICD-10-CM

## 2021-03-17 DIAGNOSIS — G40.919 INTRACTABLE EPILEPSY WITHOUT STATUS EPILEPTICUS, UNSPECIFIED EPILEPSY TYPE (HCC): ICD-10-CM

## 2021-03-17 LAB
25(OH)D3 SERPL-MCNC: 10.3 NG/ML (ref 30–100)
ALBUMIN SERPL BCP-MCNC: 3.6 G/DL (ref 3.5–5)
ALP SERPL-CCNC: 140 U/L (ref 46–116)
ALT SERPL W P-5'-P-CCNC: 29 U/L (ref 12–78)
ANION GAP SERPL CALCULATED.3IONS-SCNC: 8 MMOL/L (ref 4–13)
AST SERPL W P-5'-P-CCNC: 14 U/L (ref 5–45)
BASOPHILS # BLD AUTO: 0.05 THOUSANDS/ΜL (ref 0–0.1)
BASOPHILS NFR BLD AUTO: 1 % (ref 0–1)
BILIRUB SERPL-MCNC: 0.22 MG/DL (ref 0.2–1)
BUN SERPL-MCNC: 8 MG/DL (ref 5–25)
CALCIUM SERPL-MCNC: 8.9 MG/DL (ref 8.3–10.1)
CARBAMAZEPINE SERPL-MCNC: 7.5 UG/ML (ref 4–12)
CHLORIDE SERPL-SCNC: 107 MMOL/L (ref 100–108)
CO2 SERPL-SCNC: 27 MMOL/L (ref 21–32)
CREAT SERPL-MCNC: 0.6 MG/DL (ref 0.6–1.3)
EOSINOPHIL # BLD AUTO: 0.13 THOUSAND/ΜL (ref 0–0.61)
EOSINOPHIL NFR BLD AUTO: 3 % (ref 0–6)
ERYTHROCYTE [DISTWIDTH] IN BLOOD BY AUTOMATED COUNT: 12.9 % (ref 11.6–15.1)
GFR SERPL CREATININE-BSD FRML MDRD: 124 ML/MIN/1.73SQ M
GLUCOSE P FAST SERPL-MCNC: 90 MG/DL (ref 65–99)
HCT VFR BLD AUTO: 43.8 % (ref 34.8–46.1)
HGB BLD-MCNC: 14.1 G/DL (ref 11.5–15.4)
IMM GRANULOCYTES # BLD AUTO: 0 THOUSAND/UL (ref 0–0.2)
IMM GRANULOCYTES NFR BLD AUTO: 0 % (ref 0–2)
LYMPHOCYTES # BLD AUTO: 2.01 THOUSANDS/ΜL (ref 0.6–4.47)
LYMPHOCYTES NFR BLD AUTO: 49 % (ref 14–44)
MCH RBC QN AUTO: 30.5 PG (ref 26.8–34.3)
MCHC RBC AUTO-ENTMCNC: 32.2 G/DL (ref 31.4–37.4)
MCV RBC AUTO: 95 FL (ref 82–98)
MONOCYTES # BLD AUTO: 0.58 THOUSAND/ΜL (ref 0.17–1.22)
MONOCYTES NFR BLD AUTO: 14 % (ref 4–12)
NEUTROPHILS # BLD AUTO: 1.35 THOUSANDS/ΜL (ref 1.85–7.62)
NEUTS SEG NFR BLD AUTO: 33 % (ref 43–75)
NRBC BLD AUTO-RTO: 0 /100 WBCS
PLATELET # BLD AUTO: 346 THOUSANDS/UL (ref 149–390)
PMV BLD AUTO: 9.6 FL (ref 8.9–12.7)
POTASSIUM SERPL-SCNC: 4.1 MMOL/L (ref 3.5–5.3)
PROT SERPL-MCNC: 7.3 G/DL (ref 6.4–8.2)
RBC # BLD AUTO: 4.62 MILLION/UL (ref 3.81–5.12)
SODIUM SERPL-SCNC: 142 MMOL/L (ref 136–145)
WBC # BLD AUTO: 4.12 THOUSAND/UL (ref 4.31–10.16)

## 2021-03-17 PROCEDURE — 80156 ASSAY CARBAMAZEPINE TOTAL: CPT

## 2021-03-17 PROCEDURE — 36415 COLL VENOUS BLD VENIPUNCTURE: CPT

## 2021-03-17 PROCEDURE — 80053 COMPREHEN METABOLIC PANEL: CPT

## 2021-03-17 PROCEDURE — 82306 VITAMIN D 25 HYDROXY: CPT

## 2021-03-17 PROCEDURE — 80175 DRUG SCREEN QUAN LAMOTRIGINE: CPT

## 2021-03-17 PROCEDURE — 85025 COMPLETE CBC W/AUTO DIFF WBC: CPT

## 2021-03-17 NOTE — TELEPHONE ENCOUNTER
Telephone call to the patients sister and informed her that the patient needs to have her labs done for her visit on 3/22/21 @ 10:30 am with Dr Ureña and also Capri Lezama confirmed patients appointment

## 2021-03-18 ENCOUNTER — TELEPHONE (OUTPATIENT)
Dept: NEUROLOGY | Facility: CLINIC | Age: 31
End: 2021-03-18

## 2021-03-18 NOTE — TELEPHONE ENCOUNTER
----- Message from Dio Finn sent at 3/18/2021  8:25 AM EDT -----  Lamotrigine level still pending  Please see if she took the vitamin D weekly supplements I prescribed for her  Her vitamin D is still really low

## 2021-03-19 LAB — LAMOTRIGINE SERPL-MCNC: 5.6 UG/ML (ref 2–20)

## 2021-03-22 ENCOUNTER — OFFICE VISIT (OUTPATIENT)
Dept: NEUROLOGY | Facility: CLINIC | Age: 31
End: 2021-03-22
Payer: COMMERCIAL

## 2021-03-22 VITALS
HEIGHT: 62 IN | WEIGHT: 217.8 LBS | HEART RATE: 83 BPM | SYSTOLIC BLOOD PRESSURE: 131 MMHG | DIASTOLIC BLOOD PRESSURE: 72 MMHG | BODY MASS INDEX: 40.08 KG/M2 | RESPIRATION RATE: 18 BRPM

## 2021-03-22 DIAGNOSIS — F79 INTELLECTUAL DISABILITY: ICD-10-CM

## 2021-03-22 DIAGNOSIS — T42.75XA ANTICONVULSANT DRUG-INDUCED OSTEOMALACIA: ICD-10-CM

## 2021-03-22 DIAGNOSIS — M83.5 ANTICONVULSANT DRUG-INDUCED OSTEOMALACIA: ICD-10-CM

## 2021-03-22 DIAGNOSIS — G40.919 INTRACTABLE EPILEPSY WITHOUT STATUS EPILEPTICUS, UNSPECIFIED EPILEPSY TYPE (HCC): Primary | ICD-10-CM

## 2021-03-22 DIAGNOSIS — E55.9 VITAMIN D DEFICIENCY: ICD-10-CM

## 2021-03-22 PROCEDURE — 99215 OFFICE O/P EST HI 40 MIN: CPT | Performed by: PSYCHIATRY & NEUROLOGY

## 2021-03-22 RX ORDER — PHENOBARBITAL 97.2 MG/1
97.2 TABLET ORAL
Qty: 30 TABLET | Refills: 5 | Status: SHIPPED | OUTPATIENT
Start: 2021-03-22 | End: 2021-09-10

## 2021-03-22 RX ORDER — CARBAMAZEPINE 200 MG/1
TABLET, EXTENDED RELEASE ORAL
Qty: 60 TABLET | Refills: 5 | Status: SHIPPED | OUTPATIENT
Start: 2021-03-22 | End: 2021-06-24 | Stop reason: ALTCHOICE

## 2021-03-22 RX ORDER — LAMOTRIGINE 200 MG/1
200 TABLET ORAL 2 TIMES DAILY
Qty: 60 TABLET | Refills: 5 | Status: SHIPPED | OUTPATIENT
Start: 2021-03-22 | End: 2021-09-10

## 2021-03-22 NOTE — PATIENT INSTRUCTIONS
Plan:   1 - Finish Carbamazepine (Tegretol) ER 400mg tabs take one tab twice a day until no more, then next refill is for Carbamazepine ER 200mg tab take one tab three times a day for 2 weeks, then take one tab twice a day  2 - continue phenobarbital 97 2mg tab take ONE tab at bedtime  3 - increase lamotrigine to one 150mg in AM and one 200mg in PM until no more 150mg tab then one 200mg twice a day  4 - continue with vitamin D 66082 units one tab once a week for 8 weeks, then get vitamin D 2000 IU tabs, take one tab daily  5 - one week prior to next visit check lamotrigine level, carbamazepine level, phenobarbital, vitamin D, CMP  6 - please bring your seizure calendar to the office  7 - if you are trying to get medical clearance to work, please submit job requirement forms to the office for review  8 - follow-up with ADRYAN in 3 months and Dr Kaitlynn Mccray in 6 months    9 - please visit [facility]  com/living-epilepsy/epilepsy-and/hispanics/tratamientos for [place] language discussion about epilepsy treatment options    10 - continue with folic acid 1 mg daily

## 2021-03-22 NOTE — PROGRESS NOTES
[address] [facility]  Patient's Name: Nora Smith   Patient's : 1990   Visit Type: follow-up  Referring MD / PCP:  No primary care provider on file  Assessment:  Ms Nora Smith is a 31 y o  woman with intractable focal onset epilepsy, intellectual disability, and complaints of memory difficulties  She continues to have recurrent seizures; but the frequency of seizures is unknown, poor tracking of seizure frequency, and the patient may be amnestic her seizures  There is the risk of under reporting to seizure frequency  She continues to have seizures on three antiepileptic medications  (Her prior EEG study suggested both features of focal (left temporal or hemisphere and generalized epileptiform discharges); but her MRI brain study is without structural pathology )    She has intellectual disability that makes it difficult for her understand the risk and benefits of possible epilepsy surgery  Prior discussion of epilepsy surgery and VNS therapy caused more confusion  She would not be able to complete neuropsychological evaluation with our current resources  During this visit, we discussed to continue transitioning off of carbamazepine and increase her dose of lamotrigine  I stressed the importance of keeping track of seizure frequency, so that we have a better idea as to which medication adjustments improve seizure control and which ones worsen seizure frequency  Eventually, we will get her off of carbamazepine, but she will need to be on a higher dose of lamotrigine  Once she is off of carbamazepine we may consider other AEDs  She is concerned about not being able to work due to seizures  However, for me to be able to determine how disable she is from her seizures, I need to be able to document the frequency of her seizures (number of focal impaired aware seizures and number of generalized tonic clonic seizures per week or month)  The combination of carbamazepine and phenobarbital can result in vitamin D deficiency and osteopenia  She will need vitamin D supplementation due to vitamin D deficiency  Plan:   1 - Finish Carbamazepine (Tegretol) ER 400mg tabs take one tab twice a day until no more, then next refill is for Carbamazepine ER 200mg tab take one tab three times a day for 2 weeks, then take one tab twice a day  2 - continue phenobarbital 97 2mg tab take ONE tab at bedtime  3 - increase lamotrigine to one 150mg in AM and one 200mg in PM until no more 150mg tab then one 200mg twice a day  4 - continue with vitamin D 17735 units one tab once a week for 8 weeks, then get vitamin D 2000 IU tabs, take one tab daily  5 - one week prior to next visit check lamotrigine level, carbamazepine level, phenobarbital, vitamin D, CMP  6 - please bring your seizure calendar to the office  7 - if you are trying to get medical clearance to work, please submit job requirement forms to the office for review  8 - follow-up with ADRYAN in 3 months and Dr Jose Juan Camarillo in 6 months    9 - please visit [employer]/living-epilepsy/epilepsy-and/hispanics/tratamientos for Sami language discussion about epilepsy treatment options    10 - continue with folic acid 1 mg daily    Problem List Items Addressed This Visit        Nervous and Auditory    Intractable epilepsy without status epilepticus ([address] ) - Primary    Relevant Medications    lamoTRIgine (LaMICtal) 200 MG tablet    carBAMazepine (TEGretol XR) 200 mg 12 hr tablet    PHENobarbital (LUMINAL) 97 2 MG tablet    Other Relevant Orders    Comprehensive metabolic panel    Vitamin D 25 hydroxy    Lamotrigine level    Carbamazepine level, free    Phenobarbital level       Musculoskeletal and Integument    Anticonvulsant drug-induced osteomalacia       Other    Intellectual disability    Relevant Medications    PHENobarbital (LUMINAL) 97 2 MG tablet    Vitamin D deficiency          Chief Complaint:   Chief Complaint   Patient presents with    Seizures      HPI:    Perlita Morales is a 31 y o  right handed female here for follow-up evaluation of intractable epilepsy  Interval History 3/22/2021  She comes with her sister Melva Bryson) to help with English interpretation  However, Pavel Emerson did request to have a [place]  available   [id number]  Jordyn Velarde does not know when she is having a seizure  She lives with her mother and a sister Amparo SarahDontrell  She does not realize when she has a seizure  Her sister Melva Bryson) was talking to her on the phone the other day, and there was a couple of minutes of silence  When Perlita started to talk again she felt confused and asked what happened  No one keeps track of the frequency of her seizures  However, with the Telephone , Jordyn Velarde refers to a calendar that she has been using to keep track of her seizures (not available for this office visit)  She does not notice a significant difference between the medications  She does not remember getting a warning or blowing sound to her seizures  She has noticed some shakiness/tremors in her hands, which does not interfere with her abilities to use utensils or tools  She has difficulty remembering things (she gets confused)  She has more or less symptoms of depression  AED/side effects/compliance:  Carbamazepine -400  Phenobarbital 90mg at bedtime  Lamotrigine 150-150    Event/Seizure semiology:  1  She starts to make a blowing sound, rigid, behavioral arrest, unable to speak, hand tremors, drops things from hands, then she cannot remember what happened (amnestic to seizure)  2  One lifetime convulsion in 2012?     Woman of childbearing age with Epilepsy:  Contraception: not sexually active  Folic acid supplement:  Yes    Prior Epilepsy History:  Intake History 10/22/2019  She was previously seen by Dr Laila Adkins at [facility]  From Dr Yusef Mcdonald office note:  Seizure type 1 - behavioral arrest, tremoulous, unresponsive, amnestic to seizure, ?masticatory movements, blowing air type of sounds  Seizure type 2 - generalized tonic clonic seizure out of sleep  Patient is from the [facility]  She was diagnosed with seizures at an early age in childhood; initially there were staring episodes; mother was unable to report name of medications that were tried  She had generalized convulsions starting at the age of 25  She was initially on Tegretol  Her last visit with Dr Julián Gray was on 6/27/2019  She continued to have "a few attacks"  After a seizure she will have a headache  Patient's history: obtained using  by telephone [phone] is here with her sister  I started to have Perlita give her own history regarding seizures but she was struggling with the telephone  (not sure how to respond to questions)  She looked to her sister for information  Her sister reports that Ivan started to have seizures about 11 years ago (age 15-16, not during early childhood)  Ivan has no recollection of when she has a seizure  She did not have seizures as a baby  When she was a child she was "slow" ( used "hidden condition")  When she was born, there was a problem during birth, the doctor was pushing or pulling during the delivery and there was something in the brain that got "disconnected"  Her sister reports that Perlita seizures involve Perlita appearing paralyzed, unable to speak, with hand tremors, rigid and stiff, and forgets what she is doing  If she has something in her hands she just drops it  This seizure can be about a minute and may repeat 3 times within 5 minutes  Ivan has a warning of making a blowing sound with her mouth  Her last seizure was last night  She has seizures about a couple of times a week  There was one seizure that caused her to go into a convulsion (this happened 7 years ago)      She started a new job last week, she could not work because she had a seizure there  She could not hold a job because she had seizures at work  Summary 2020  -800, PB 90 qHS  When Perlita has a seizure she is standing, appearing like she zoned out  The family does not keep track of the seizure frequency  Prior to the last visit with me, she would have about 8 seizures a month  Caroline thinks that Perlita may have been on carbamazepine longer than phenobarbital   We added lamotrigine and started to reduce her dose of carbamazepine  Caroline, her sister, accompanies Marcelo Maurer to her office visits (as assist with [place])  She continued to have seizures  These seizures are still consistent with the Type 1, behavioral arrest and unable to speak  Special Features  Status epilepticus: No  Self Injury Seizures: No  Precipitating Factors: None  Post-ictal state: amnestic, confusion    Epilepsy Risk Factors:  Abnormal pregnancy: No  Abnormal birth/: No  Abnormal Development: No  Febrile seizures, simple: No  Febrile seizures, complex: No  CNS infection: No  Mental retardation: No  Cerebral palsy: No  Head injury (moderate/severe): No  CNS neoplasm: No  CNS malformation: No  Neurosurgical procedure: No  Stroke: No  Alcohol abuse: No  Drug abuse: No  Family history Sz/epilepsy: No    Prior AEDs:  medication Max dose Time used Reason to stop   Levetiracetam   ?behavioral problem   phenobarbital      carbamazepine      oxcarbazepine      lamotrigine              Prior workup:  x  [id number]  MRI brain - [facility]  Normal MR of brain     2019  MRI brain w/wo  Normal MRI brain study -  No pathology was identified in the left frontal or temporal regions  EEGs:  2015 [facility]   Multifocal independent spike-slow waves with phase reversal over the left temporal region T3, T5, Fp1, Fp2, F3 along with generalized spike-slow waves at 1 Hz runs    There are small sharp waves in the bilateral frontal region    -2016 - [facility]  48 hours ambulatory EEG  Generalized spike and waves and polyspike and waves  There is an electrographic seizure but no symptoms were reported  7/11/2016 - 11:17 - generalized bifrontal spike-wave complexes with generalized 7-8 Hz activity    3/5/2020  Poorly organized theta/delta slowing and sharply contoured waveforms on the left temporal area  There seems to bifrontal triphasic waves; but on my interpretation of the EEG there are left frontotemporal spike-slow waves  9/9-9/11/2020 - 48 hours ambulatory EEG  Frequent left temporal delta/theta activity with left midtemporal sharp waves  Intermittent delta activity, sharp waves over the left frontal anterior temporal region      Labs:  Component      Latest Ref Rng & Units 12/1/2020 3/17/2021   WBC      4 31 - 10 16 Thousand/uL 5 48 4 12 (L)   Red Blood Cell Count      3 81 - 5 12 Million/uL 4 73 4 62   Hemoglobin      11 5 - 15 4 g/dL 14 2 14 1   HCT      34 8 - 46 1 % 44 6 43 8   Platelet Count      796 - 390 Thousands/uL 357 346   Sodium      136 - 145 mmol/L 140 142   Potassium      3 5 - 5 3 mmol/L 4 1 4 1   Chloride      100 - 108 mmol/L 109 (H) 107   CO2      21 - 32 mmol/L 28 27   Anion Gap      4 - 13 mmol/L 3 (L) 8   BUN      5 - 25 mg/dL 12 8   Creatinine      0 60 - 1 30 mg/dL 0 59 (L) 0 60   GLUCOSE FASTING      65 - 99 mg/dL 82 90   Calcium      8 3 - 10 1 mg/dL 8 8 8 9   AST      5 - 45 U/L 18 14   ALT      12 - 78 U/L 30 29   Alkaline Phosphatase      46 - 116 U/L 140 (H) 140 (H)   Total Protein      6 4 - 8 2 g/dL 7 7 7 3   Albumin      3 5 - 5 0 g/dL 3 7 3 6   TOTAL BILIRUBIN      0 20 - 1 00 mg/dL 0 18 (L) 0 22   eGFR      ml/min/1 73sq m 126 124   Vit D, 25-Hydroxy      30 0 - 100 0 ng/mL 8 3 (L) 10 3 (L)   LAMOTRIGINE LEVEL      2 0 - 20 0 ug/mL 3 9 5 6   LEVETIRACETA (KEPPRA)      10 0 - 40 0 ug/mL <1 0 (L)    CARBAMAZEPINE LEVEL      4 0 - 12 0 ug/mL 14 3 (H) 7 5       General exam   /72 (BP Location: Right arm, Patient Position: Sitting, Cuff Size: Standard)   Pulse 83   Resp 18   Ht 5' 2" (1 575 m)   Wt 98 8 kg (217 lb 12 8 oz)   BMI 39 84 kg/m²    Appearance: normally developed, appears well  Carotids: n/a  Cardiovascular: regular rate and rhythm and normal heart sounds  Pulmonary: clear to auscultation  Abdominal: obese, nontender, nondistended    HEENT: anicteric and neck is supple   Fundoscopy: not assessed    Mental status  Orientation: alert and oriented to name, place, time  Fund of Knowledge: limited ability to comprehend questions   Attention and Concentration: intact  Current and Remote Memory:intact  Language: spontaneous speech is normal and comprehension is intact    Cranial Nerves  CN 1: not tested  CN 2: pupils equal round reactive to direct and consenual light   CN 3, 4, 6: EOMI, no nystagmus  CN 5:not assessed  CN 7:not assessed  CN 8:not assessed  CN 9, 10:no dysarthria present  CN 11:symmetric SCM with head turns  CN 12:not assessed    Motor:  Bulk, Tone: normal bulk, normal tone  Pronation: no pronator drift  Strength: Symmetric strength of the arms and legs, no lateralizing weakness     Sensory:  Lighttouch: intact in all limbs  Romberg:not assessed    Coordination:  FNF:FNF bilaterally intact  PAULINO:intact  FFM:intact  Gait/Station:normal gait    Reflexes:  Not assessed    Past Medical/Surgical History:  Patient Active Problem List   Diagnosis    Body mass index (BMI) of 33 0 to 33 9 in adult    Intellectual disability    Intractable epilepsy without status epilepticus ([facility] )    Anticonvulsant drug-induced osteomalacia    Vitamin D deficiency     Past Medical History:   Diagnosis Date    [facility]izures [facility] )      History reviewed  No pertinent surgical history      Past Psychiatric History:  Depression: Yes  Anxiety: No  Psychosis: No    Medications:    Current Outpatient Medications:     carBAMazepine (TEGretol XR) 200 mg 12 hr tablet, Take one tab three times a day for 2 weeks, then decrease to one tab twice a day , Disp: 60 tablet, Rfl: 5    ergocalciferol (VITAMIN D2) 50,000 units, Take 1 capsule (50,000 Units total) by mouth once a week, Disp: 8 capsule, Rfl: 0    folic acid (FOLVITE) 1 mg tablet, Take 1 tablet (1 mg total) by mouth daily, Disp: 30 tablet, Rfl: 11    lamoTRIgine (LaMICtal) 200 MG tablet, Take 1 tablet (200 mg total) by mouth 2 (two) times a day, Disp: 60 tablet, Rfl: 5    PHENobarbital (LUMINAL) 97 2 MG tablet, Take 1 tablet (97 2 mg total) by mouth daily at bedtime, Disp: 30 tablet, Rfl: 5    Allergies:  No Known Allergies    Family history:  Family History   Problem Relation Age of Onset    Hypertension Maternal Grandmother     Colon polyps Mother     Glaucoma Mother     Asthma Sister      There is no family history of seizure, epilepsy or developmental delay  Social History  Living situation:  Lives with family  Work:  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, 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  Driving:  No   reports that she has never smoked  She has never used smokeless tobacco  She reports that she does not drink alcohol or use drugs  Review of Systems  A review of at least 12 organ/systems was obtained by the medical assistant and reviewed by me, including additional positives/negatives:  Respiratory: Positive for shortness of breath  Neurological: Positive for seizures (last seizures- last Sunday) and headaches  Negative for dizziness, tremors, syncope, facial asymmetry, speech difficulty, weakness, light-headedness and numbness  Hematological: Negative  Does not bruise/bleed easily  Psychiatric/Behavioral: Positive for confusion (at times)  Negative for hallucinations and sleep disturbance  The patient is nervous/anxious  Depression, memory issues     Decision making was of high-complexity due to the patient's high risk condition (seizures), psychiatric and neuropsychological comorbidities, behavioral problems, memory and cognitive problems and medication side effects  The total amount of time spent with the patient along with pre-chart and post-chart preparation was 40 minutes on the calendar day of the date of service  This included history taking, physical exam, review of ancillary testing, counseling provided to the patient regarding diagnosis, medications, treatment, and risk management, and other communication to the patient's providers and/or family    Start time: 11AM  End time: 11:40AM

## 2021-03-22 NOTE — TELEPHONE ENCOUNTER
It looks like she saw Dr Sean Alvarez today and her dose was increased, we dont need to call       Thank you

## 2021-03-22 NOTE — PROGRESS NOTES
Review of Systems   Constitutional: Negative  Negative for appetite change and fever  HENT: Negative  Negative for hearing loss, tinnitus, trouble swallowing and voice change  Eyes: Negative  Negative for photophobia and pain  Respiratory: Positive for shortness of breath  Cardiovascular: Negative  Negative for palpitations  Gastrointestinal: Negative  Negative for nausea and vomiting  Endocrine: Negative  Negative for cold intolerance  Genitourinary: Negative  Negative for dysuria, frequency and urgency  Musculoskeletal: Negative  Negative for myalgias and neck pain  Skin: Negative  Negative for rash  Allergic/Immunologic: Negative  Neurological: Positive for seizures (last seizures- last Sunday) and headaches  Negative for dizziness, tremors, syncope, facial asymmetry, speech difficulty, weakness, light-headedness and numbness  Hematological: Negative  Does not bruise/bleed easily  Psychiatric/Behavioral: Positive for confusion (at times)  Negative for hallucinations and sleep disturbance  The patient is nervous/anxious           Depression, memory issues

## 2021-06-14 ENCOUNTER — APPOINTMENT (OUTPATIENT)
Dept: LAB | Age: 31
End: 2021-06-14
Payer: COMMERCIAL

## 2021-06-14 DIAGNOSIS — G40.919 INTRACTABLE EPILEPSY WITHOUT STATUS EPILEPTICUS, UNSPECIFIED EPILEPSY TYPE (HCC): ICD-10-CM

## 2021-06-14 LAB
25(OH)D3 SERPL-MCNC: 5.7 NG/ML (ref 30–100)
ALBUMIN SERPL BCP-MCNC: 3.7 G/DL (ref 3.5–5)
ALP SERPL-CCNC: 148 U/L (ref 46–116)
ALT SERPL W P-5'-P-CCNC: 26 U/L (ref 12–78)
ANION GAP SERPL CALCULATED.3IONS-SCNC: 3 MMOL/L (ref 4–13)
AST SERPL W P-5'-P-CCNC: 15 U/L (ref 5–45)
BILIRUB SERPL-MCNC: 0.22 MG/DL (ref 0.2–1)
BUN SERPL-MCNC: 8 MG/DL (ref 5–25)
CALCIUM SERPL-MCNC: 9.3 MG/DL (ref 8.3–10.1)
CHLORIDE SERPL-SCNC: 106 MMOL/L (ref 100–108)
CO2 SERPL-SCNC: 28 MMOL/L (ref 21–32)
CREAT SERPL-MCNC: 0.61 MG/DL (ref 0.6–1.3)
GFR SERPL CREATININE-BSD FRML MDRD: 124 ML/MIN/1.73SQ M
GLUCOSE SERPL-MCNC: 88 MG/DL (ref 65–140)
PHENOBARB SERPL-MCNC: 15.7 UG/ML (ref 15–40)
POTASSIUM SERPL-SCNC: 4.4 MMOL/L (ref 3.5–5.3)
PROT SERPL-MCNC: 7.6 G/DL (ref 6.4–8.2)
SODIUM SERPL-SCNC: 137 MMOL/L (ref 136–145)

## 2021-06-14 PROCEDURE — 80053 COMPREHEN METABOLIC PANEL: CPT

## 2021-06-14 PROCEDURE — 36415 COLL VENOUS BLD VENIPUNCTURE: CPT

## 2021-06-14 PROCEDURE — 80184 ASSAY OF PHENOBARBITAL: CPT

## 2021-06-14 PROCEDURE — 80157 ASSAY CARBAMAZEPINE FREE: CPT

## 2021-06-14 PROCEDURE — 82306 VITAMIN D 25 HYDROXY: CPT

## 2021-06-14 PROCEDURE — 80175 DRUG SCREEN QUAN LAMOTRIGINE: CPT

## 2021-06-16 LAB
CARBAMAZEPINE FREE SERPL-MCNC: 1.1 UG/ML (ref 0.6–4.2)
LAMOTRIGINE SERPL-MCNC: 8 UG/ML (ref 2–20)

## 2021-06-22 ENCOUNTER — TELEPHONE (OUTPATIENT)
Dept: NEUROLOGY | Facility: CLINIC | Age: 31
End: 2021-06-22

## 2021-06-22 NOTE — TELEPHONE ENCOUNTER
----- Message from Michael Rasmussen MD sent at 6/21/2021  3:41 PM EDT -----  Lamotrigine level is good, carbamazepine level is consistent with further dose reduction  Vitamin D level is still very deficient  Please find out if she has been taking vitamin D supplementation  What is her current dose?

## 2021-06-22 NOTE — TELEPHONE ENCOUNTER
Kamlesh Urrutia will address vitamin D at Thursday follow-up visit  Please have patient bring with her all of her medication bottles to the office visit to review  May need either dose increase of vitamin D supplements or 61458 units/week for 12-16 weeks

## 2021-06-22 NOTE — TELEPHONE ENCOUNTER
MISHA    Called and discussed with patient's sister  She is unable to provide the dose of her vitamin D  Janell Breath that she will have that information at her office visit that is scheduled for Thursday

## 2021-06-24 ENCOUNTER — OFFICE VISIT (OUTPATIENT)
Dept: NEUROLOGY | Facility: CLINIC | Age: 31
End: 2021-06-24
Payer: COMMERCIAL

## 2021-06-24 VITALS
HEART RATE: 103 BPM | HEIGHT: 62 IN | DIASTOLIC BLOOD PRESSURE: 85 MMHG | SYSTOLIC BLOOD PRESSURE: 136 MMHG | WEIGHT: 216.2 LBS | BODY MASS INDEX: 39.79 KG/M2

## 2021-06-24 DIAGNOSIS — F79 INTELLECTUAL DISABILITY: ICD-10-CM

## 2021-06-24 DIAGNOSIS — E55.9 VITAMIN D DEFICIENCY: ICD-10-CM

## 2021-06-24 DIAGNOSIS — G40.919 INTRACTABLE EPILEPSY WITHOUT STATUS EPILEPTICUS, UNSPECIFIED EPILEPSY TYPE (HCC): Primary | ICD-10-CM

## 2021-06-24 PROCEDURE — 99214 OFFICE O/P EST MOD 30 MIN: CPT | Performed by: PHYSICIAN ASSISTANT

## 2021-06-24 RX ORDER — ERGOCALCIFEROL 1.25 MG/1
50000 CAPSULE ORAL WEEKLY
Qty: 12 CAPSULE | Refills: 0 | Status: SHIPPED | OUTPATIENT
Start: 2021-06-24 | End: 2021-09-10

## 2021-06-24 RX ORDER — LAMOTRIGINE 25 MG/1
TABLET ORAL
Qty: 60 TABLET | Refills: 5 | Status: SHIPPED | OUTPATIENT
Start: 2021-06-24 | End: 2021-09-10

## 2021-06-24 NOTE — PROGRESS NOTES
Patient ID: Theresa Fernandez is a 31 y o  female  Assessment:  Ms Theresa Fernandez is a 31 y o  woman with intractable focal onset epilepsy, intellectual disability, and complaints of memory difficulties  She continues to have recurrent seizures; but the frequency of seizures is unknown, as there is poor tracking of seizure frequency, and the patient may be amnestic her seizures  Her prior EEG study suggested both features of focal (left temporal or hemisphere and generalized epileptiform discharges); but her MRI brain study is without structural pathology  We have been transitioning off of carbamazepine and increasing her dose of lamotrigine  Once she is off of carbamazepine we may consider other AEDs, as she continues to have seizures on 3 AEDs  She has significant vitamin D deficiency but is not taking a vit D supplement  Per her sister, they never got the Rx from the pharmacy  Plan:   1  Increase lamotrigine  Take 200mg AM and 225mg PM x 2 weeks, then 225mg twice a day  2  Decrease carbamzepine ER 200mg  Take 1 tablet daily until you are finished with the current bottle, then stop the medication  3  Continue phenobarbital 97 2mg daily  4  Will send in prescription for vitamin D 50,000IU to be taken once a week  Call the office if you cannot get this from the pharmacy  5  Please get labs done prior to next visit (CBC, CMP, lamotrigine level, phenobarbital level, vit D level)  6  continue with folic acid 1mg daily  7  Follow up with Dr Katy Richter as scheduled on 9/10/21 at 2:30pm   8  Continue to keep a calendar of seizure frequency         Diagnoses and all orders for this visit:    Intractable epilepsy without status epilepticus, unspecified epilepsy type ([facility] [address] )    Vitamin D deficiency    Intellectual disability           Subjective:    HPI    Perlita Bloom Zo Espinosa is a 31 y o  right handed female here for follow-up evaluation of intractable epilepsy       Interval History 6/24/2021  She comes with her sister Marya Collins) to help with English interpretation  Patient and sister report she is doing well  She does have a seizure calendar with her, although these are only seizures that were witnessed, as Ivan herself does not know when she is having a seizure  2 reported seizures in April  Last seizure documented was May 27th  She has tolerated the med changes  She is not taking any vit D and vit D level is very low at 5 7  Her sister says the pharmacy never gave them the vit D Rx that was prescribed previously  AED/side effects/compliance:  Carbamazepine -200  Phenobarbital 97 2mg at bedtime  Lamotrigine 200-200     Event/Seizure semiology:  1  She starts to make a blowing sound, rigid, behavioral arrest, unable to speak, hand tremors, drops things from hands, then she cannot remember what happened (amnestic to seizure)  2  One lifetime convulsion in 2012? Woman of childbearing age with Epilepsy:  Contraception: not sexually active  Folic acid supplement:  Yes     Prior Epilepsy History:  Intake History 10/22/2019  She was previously seen by Dr Giancarlo Garcia at [facility]  From Dr Shiela Minaya office note:  Seizure type 1 - behavioral arrest, tremoulous, unresponsive, amnestic to seizure, ?masticatory movements, blowing air type of sounds  Seizure type 2 - generalized tonic clonic seizure out of sleep  Patient is from the [facility]  She was diagnosed with seizures at an early age in childhood; initially there were staring episodes; mother was unable to report name of medications that were tried  She had generalized convulsions starting at the age of 25  She was initially on Tegretol  Her last visit with Dr Giancarlo Garcia was on 6/27/2019  She continued to have "a few attacks"  After a seizure she will have a headache  Patient's history: obtained using  by telephone [phone] is here with her sister    I started to have Perlita give her own history regarding seizures but she was struggling with the telephone  (not sure how to respond to questions)  She looked to her sister for information  Her sister reports that Ivan started to have seizures about 11 years ago (age 15-16, not during early childhood)  Ivan has no recollection of when she has a seizure  She did not have seizures as a baby  When she was a child she was "slow" ( used "hidden condition")  When she was born, there was a problem during birth, the doctor was pushing or pulling during the delivery and there was something in the brain that got "disconnected"  Her sister reports that Perlita seizures involve Perlita appearing paralyzed, unable to speak, with hand tremors, rigid and stiff, and forgets what she is doing  If she has something in her hands she just drops it  This seizure can be about a minute and may repeat 3 times within 5 minutes  Ivan has a warning of making a blowing sound with her mouth  Her last seizure was last night  She has seizures about a couple of times a week  There was one seizure that caused her to go into a convulsion (this happened 7 years ago)  She started a new job last week, she could not work because she had a seizure there  She could not hold a job because she had seizures at work  Summary 2020  -800, PB 90 qHS  When Perlita has a seizure she is standing, appearing like she zoned out  The family does not keep track of the seizure frequency  Prior to the last visit with me, she would have about 8 seizures a month  Caroline thinks that Perlita may have been on carbamazepine longer than phenobarbital   We added lamotrigine and started to reduce her dose of carbamazepine  Caroline, her sister, accompanies Ivan to her office visits (as assist with [place])  She continued to have seizures  These seizures are still consistent with the Type 1, behavioral arrest and unable to speak        Interval History 3/22/2021  She comes with her sister Basilio Kumar) to help with [place] interpretation  However, Diana Valentin did request to have a [place]  available   [id number]  Junior Thornton does not know when she is having a seizure  She lives with her mother and a sister Onur Hastings)  She does not realize when she has a seizure  Her sister Basilio Kumar) was talking to her on the phone the other day, and there was a couple of minutes of silence  When Perlita started to talk again she felt confused and asked what happened  No one keeps track of the frequency of her seizures  However, with the Telephone , Junior Thornton refers to a calendar that she has been using to keep track of her seizures (not available for this office visit)  She does not notice a significant difference between the medications  She does not remember getting a warning or blowing sound to her seizures  She has noticed some shakiness/tremors in her hands, which does not interfere with her abilities to use utensils or tools  She has difficulty remembering things (she gets confused)  She has more or less symptoms of depression         Special Features  Status epilepticus: No  Self Injury Seizures: No  Precipitating Factors: None  Post-ictal state: amnestic, confusion     Epilepsy Risk Factors:  Abnormal pregnancy: No  Abnormal birth/: No  Abnormal Development: No  Febrile seizures, simple: No  Febrile seizures, complex: No  CNS infection: No  Mental retardation: No  Cerebral palsy: No  Head injury (moderate/severe): No  CNS neoplasm: No  CNS malformation: No  Neurosurgical procedure: No  Stroke: No  Alcohol abuse: No  Drug abuse: No  Family history Sz/epilepsy: No     Prior AEDs:  medication Max dose Time used Reason to stop   Levetiracetam     ?behavioral problem   phenobarbital         carbamazepine         oxcarbazepine         lamotrigine                      Prior workup:  x  [id number]  MRI brain - [facility]  Normal MR of brain      11/8/2019  MRI brain w/wo  Normal MRI brain study -  No pathology was identified in the left frontal or temporal regions  EEGs:  12/2/2015 [facility]   Multifocal independent spike-slow waves with phase reversal over the left temporal region T3, T5, Fp1, Fp2, F3 along with generalized spike-slow waves at 1 Hz runs  There are small sharp waves in the bilateral frontal region     7/11-7/13/2016 - [facility]  48 hours ambulatory EEG  Generalized spike and waves and polyspike and waves  There is an electrographic seizure but no symptoms were reported  7/11/2016 - 11:17 - generalized bifrontal spike-wave complexes with generalized 7-8 Hz activity     3/5/2020  Poorly organized theta/delta slowing and sharply contoured waveforms on the left temporal area  There seems to bifrontal triphasic waves; but on my interpretation of the EEG there are left frontotemporal spike-slow waves  9/9-9/11/2020 - 48 hours ambulatory EEG  Frequent left temporal delta/theta activity with left midtemporal sharp waves  Intermittent delta activity, sharp waves over the left frontal anterior temporal region  Labs  Ref Range & Units 6/14/21  2:37 PM   Vit D, 25-Hydroxy 30 0 - 100 0 ng/mL       Ref Range & Units 6/14/21  2:37 PM    Lamotrigine Lvl 2 0 - 20 0 ug/mL 8 0      Ref Range & Units 6/14/21  2:37 PM    Carbamazepine, Free 0 6 - 4 2 ug/mL 1 1       Ref Range & Units 6/14/21  2:37 PM   Phenobarbital Lvl 15 0 - 40 0 ug/mL 15 7       Ref Range & Units 6/14/21  2:37 PM   Sodium 136 - 145 mmol/L 137    Potassium 3 5 - 5 3 mmol/L 4 4    Chloride 100 - 108 mmol/L 106    CO2 21 - 32 mmol/L 28    ANION GAP 4 - 13 mmol/L 3Low     BUN 5 - 25 mg/dL 8    Creatinine 0 60 - 1 30 mg/dL 0 61    Comment: Standardized to IDMS reference method   Glucose 65 - 140 mg/dL 88    Comment: If the patient is fasting, the ADA then defines impaired fasting glucose as > 100 mg/dL and diabetes as > or equal to 123 mg/dL     Specimen collection should occur prior to Sulfasalazine administration due to the potential for falsely depressed results  Specimen collection should occur prior to Sulfapyridine administration due to the potential for falsely elevated results  Calcium 8 3 - 10 1 mg/dL 9 3    AST 5 - 45 U/L 15    Comment: Specimen collection should occur prior to Sulfasalazine administration due to the potential for falsely depressed results  ALT 12 - 78 U/L 26    Comment: Specimen collection should occur prior to Sulfasalazine and/or Sulfapyridine administration due to the potential for falsely depressed results  Alkaline Phosphatase 46 - 116 U/L 148High     Total Protein 6 4 - 8 2 g/dL 7 6    Albumin 3 5 - 5 0 g/dL 3 7    Total Bilirubin 0 20 - 1 00 mg/dL 0 22    Comment: Use of this assay is not recommended for patients undergoing treatment with eltrombopag due to the potential for falsely elevated results  eGFR ml/min/1 73sq m 124          The following portions of the patient's history were reviewed and updated as appropriate: current medications, past family history, past medical history, past social history, past surgical history and problem list          Objective:    Blood pressure 136/85, pulse 103, height 5' 2" (1 575 m), weight 98 1 kg (216 lb 3 2 oz)  Physical Exam  Constitutional:       Appearance: Normal appearance  HENT:      Head: Normocephalic and atraumatic  Eyes:      Extraocular Movements: EOM normal       Pupils: Pupils are equal, round, and reactive to light  Skin:     General: Skin is warm and dry  Neurological:      Mental Status: She is alert  Deep Tendon Reflexes: Strength normal and reflexes are normal and symmetric  Psychiatric:         Mood and Affect: Mood normal          Speech: Speech normal          Behavior: Behavior normal          Neurological Exam  Mental Status  Alert  Oriented to person, place, time and situation  Speech is normal  Language is fluent with no aphasia   Attention and concentration are normal   Some difficulty comprehending questions, her sister assisted   Cranial Nerves  CN II: Visual fields full to confrontation  CN III, IV, VI: Extraocular movements intact bilaterally  Pupils equal round and reactive to light bilaterally  CN V: Facial sensation is normal   CN VII: Full and symmetric facial movement  CN VIII: Hearing is normal   CN IX, X: Palate elevates symmetrically  CN XI: Shoulder shrug strength is normal   CN XII: Tongue midline without atrophy or fasciculations  Motor   Normal muscle tone  Strength is 5/5 throughout all four extremities  Sensory  Light touch is normal in upper and lower extremities  Reflexes  Deep tendon reflexes are 2+ and symmetric in all four extremities with downgoing toes bilaterally  Coordination  Right: Finger-to-nose normal   Left: Finger-to-nose normal     Gait  Casual gait is normal including stance, stride, and arm swing  ROS:    Review of Systems   Constitutional: Negative  Negative for fatigue and fever  HENT: Negative  Negative for hearing loss, tinnitus and trouble swallowing  Eyes: Negative for photophobia, pain and visual disturbance  Respiratory: Negative  Negative for cough and shortness of breath  Cardiovascular: Negative  Negative for chest pain, palpitations and leg swelling  Gastrointestinal: Negative for constipation, diarrhea, nausea and vomiting  Endocrine: Negative  Genitourinary: Negative  Musculoskeletal: Negative  Negative for gait problem  Skin: Negative  Neurological: Positive for seizures and headaches  Negative for dizziness, tremors, syncope, speech difficulty, weakness, light-headedness and numbness  Hematological: Negative  Psychiatric/Behavioral: Positive for confusion  Negative for decreased concentration, dysphoric mood, hallucinations, sleep disturbance and suicidal ideas  The patient is not nervous/anxious        I personally reviewed and updated the ROS as appropriate

## 2021-08-16 NOTE — PATIENT INSTRUCTIONS
1  Increase lamotrigine  Take 200mg AM and 225mg PM x 2 weeks, then 225mg twice a day  2  Decrease carbamzepine ER 200mg  Take 1 tablet daily until you are finished with the current bottle, then stop the medication  3  Continue phenobarbital 97 2mg daily  4  Will send in prescription for vitamin D 50,000IU to be taken once a week  Call the office if you cannot get this from the pharmacy  5  Please get labs done prior to next visit (CBC, CMP, lamotrigine level, phenobarbital level, vit D level)  6  continue with folic acid 1mg daily  7  Follow up with Dr Rancho Garcia as scheduled on 9/10/21 at 2:30pm   8   Continue to keep a calendar of seizure frequency
[id number]

## 2021-09-02 ENCOUNTER — TELEPHONE (OUTPATIENT)
Dept: NEUROLOGY | Facility: CLINIC | Age: 31
End: 2021-09-02

## 2021-09-07 ENCOUNTER — APPOINTMENT (OUTPATIENT)
Dept: LAB | Facility: HOSPITAL | Age: 31
End: 2021-09-07
Payer: COMMERCIAL

## 2021-09-07 DIAGNOSIS — G40.919 INTRACTABLE EPILEPSY WITHOUT STATUS EPILEPTICUS, UNSPECIFIED EPILEPSY TYPE (HCC): ICD-10-CM

## 2021-09-07 DIAGNOSIS — E55.9 VITAMIN D DEFICIENCY: ICD-10-CM

## 2021-09-07 LAB
ALBUMIN SERPL BCP-MCNC: 3.9 G/DL (ref 3.5–5)
ALP SERPL-CCNC: 147 U/L (ref 46–116)
ALT SERPL W P-5'-P-CCNC: 27 U/L (ref 12–78)
ANION GAP SERPL CALCULATED.3IONS-SCNC: 6 MMOL/L (ref 4–13)
AST SERPL W P-5'-P-CCNC: 17 U/L (ref 5–45)
BASOPHILS # BLD AUTO: 0.09 THOUSANDS/ΜL (ref 0–0.1)
BASOPHILS NFR BLD AUTO: 2 % (ref 0–1)
BILIRUB SERPL-MCNC: 0.17 MG/DL (ref 0.2–1)
BUN SERPL-MCNC: 5 MG/DL (ref 5–25)
CALCIUM SERPL-MCNC: 8.9 MG/DL (ref 8.3–10.1)
CHLORIDE SERPL-SCNC: 103 MMOL/L (ref 100–108)
CO2 SERPL-SCNC: 30 MMOL/L (ref 21–32)
CREAT SERPL-MCNC: 0.71 MG/DL (ref 0.6–1.3)
EOSINOPHIL # BLD AUTO: 0.17 THOUSAND/ΜL (ref 0–0.61)
EOSINOPHIL NFR BLD AUTO: 3 % (ref 0–6)
ERYTHROCYTE [DISTWIDTH] IN BLOOD BY AUTOMATED COUNT: 12.8 % (ref 11.6–15.1)
GFR SERPL CREATININE-BSD FRML MDRD: 116 ML/MIN/1.73SQ M
GLUCOSE SERPL-MCNC: 93 MG/DL (ref 65–140)
HCT VFR BLD AUTO: 44.6 % (ref 34.8–46.1)
HGB BLD-MCNC: 14.4 G/DL (ref 11.5–15.4)
IMM GRANULOCYTES # BLD AUTO: 0.02 THOUSAND/UL (ref 0–0.2)
IMM GRANULOCYTES NFR BLD AUTO: 0 % (ref 0–2)
LYMPHOCYTES # BLD AUTO: 2.65 THOUSANDS/ΜL (ref 0.6–4.47)
LYMPHOCYTES NFR BLD AUTO: 43 % (ref 14–44)
MCH RBC QN AUTO: 30.3 PG (ref 26.8–34.3)
MCHC RBC AUTO-ENTMCNC: 32.3 G/DL (ref 31.4–37.4)
MCV RBC AUTO: 94 FL (ref 82–98)
MONOCYTES # BLD AUTO: 0.77 THOUSAND/ΜL (ref 0.17–1.22)
MONOCYTES NFR BLD AUTO: 12 % (ref 4–12)
NEUTROPHILS # BLD AUTO: 2.49 THOUSANDS/ΜL (ref 1.85–7.62)
NEUTS SEG NFR BLD AUTO: 40 % (ref 43–75)
NRBC BLD AUTO-RTO: 0 /100 WBCS
PLATELET # BLD AUTO: 331 THOUSANDS/UL (ref 149–390)
PMV BLD AUTO: 9.7 FL (ref 8.9–12.7)
POTASSIUM SERPL-SCNC: 4.2 MMOL/L (ref 3.5–5.3)
PROT SERPL-MCNC: 7.8 G/DL (ref 6.4–8.2)
RBC # BLD AUTO: 4.75 MILLION/UL (ref 3.81–5.12)
SODIUM SERPL-SCNC: 139 MMOL/L (ref 136–145)
WBC # BLD AUTO: 6.19 THOUSAND/UL (ref 4.31–10.16)

## 2021-09-07 PROCEDURE — 85025 COMPLETE CBC W/AUTO DIFF WBC: CPT

## 2021-09-07 PROCEDURE — 80175 DRUG SCREEN QUAN LAMOTRIGINE: CPT

## 2021-09-07 PROCEDURE — 80184 ASSAY OF PHENOBARBITAL: CPT

## 2021-09-07 PROCEDURE — 82306 VITAMIN D 25 HYDROXY: CPT

## 2021-09-07 PROCEDURE — 36415 COLL VENOUS BLD VENIPUNCTURE: CPT

## 2021-09-07 PROCEDURE — 80053 COMPREHEN METABOLIC PANEL: CPT

## 2021-09-08 LAB
25(OH)D3 SERPL-MCNC: 61.4 NG/ML (ref 30–100)
PHENOBARB SERPL-MCNC: 18.2 UG/ML (ref 15–40)

## 2021-09-10 ENCOUNTER — TELEPHONE (OUTPATIENT)
Dept: NEUROLOGY | Facility: CLINIC | Age: 31
End: 2021-09-10

## 2021-09-10 ENCOUNTER — OFFICE VISIT (OUTPATIENT)
Dept: NEUROLOGY | Facility: CLINIC | Age: 31
End: 2021-09-10
Payer: COMMERCIAL

## 2021-09-10 VITALS
DIASTOLIC BLOOD PRESSURE: 80 MMHG | WEIGHT: 206.4 LBS | BODY MASS INDEX: 37.75 KG/M2 | SYSTOLIC BLOOD PRESSURE: 110 MMHG | HEART RATE: 103 BPM

## 2021-09-10 DIAGNOSIS — G40.919 INTRACTABLE EPILEPSY WITHOUT STATUS EPILEPTICUS, UNSPECIFIED EPILEPSY TYPE (HCC): Primary | ICD-10-CM

## 2021-09-10 LAB — LAMOTRIGINE SERPL-MCNC: 8.6 UG/ML (ref 2–20)

## 2021-09-10 PROCEDURE — 99215 OFFICE O/P EST HI 40 MIN: CPT | Performed by: PSYCHIATRY & NEUROLOGY

## 2021-09-10 PROCEDURE — 99417 PROLNG OP E/M EACH 15 MIN: CPT | Performed by: PSYCHIATRY & NEUROLOGY

## 2021-09-10 RX ORDER — LAMOTRIGINE 25 MG/1
TABLET ORAL
Qty: 120 TABLET | Refills: 5 | Status: SHIPPED | OUTPATIENT
Start: 2021-09-10 | End: 2021-10-26 | Stop reason: HOSPADM

## 2021-09-10 RX ORDER — FOLIC ACID 1 MG/1
1 TABLET ORAL DAILY
Qty: 30 TABLET | Refills: 11 | Status: SHIPPED | OUTPATIENT
Start: 2021-09-10 | End: 2022-01-28 | Stop reason: SDUPTHER

## 2021-09-10 RX ORDER — MULTIVIT-MIN/IRON/FOLIC ACID/K 18-600-40
1 CAPSULE ORAL DAILY
Qty: 30 CAPSULE | Refills: 11 | Status: SHIPPED | OUTPATIENT
Start: 2021-09-10 | End: 2022-05-11 | Stop reason: SDUPTHER

## 2021-09-10 RX ORDER — PHENOBARBITAL 97.2 MG/1
97.2 TABLET ORAL
Qty: 30 TABLET | Refills: 5 | Status: SHIPPED | OUTPATIENT
Start: 2021-09-10 | End: 2022-01-28 | Stop reason: SDUPTHER

## 2021-09-10 RX ORDER — LAMOTRIGINE 200 MG/1
200 TABLET ORAL 2 TIMES DAILY
Qty: 60 TABLET | Refills: 5 | Status: ON HOLD | OUTPATIENT
Start: 2021-09-10 | End: 2021-10-26 | Stop reason: SDUPTHER

## 2021-09-10 NOTE — PROGRESS NOTES
[address] [facility]  Patient's Name: Vandana Rodriguez   Patient's : 1990   Visit Type: follow-up  Referring MD / PCP:  Gricelda Campbell MD    Assessment:  Ms Vandana Rodriguez is a 31 y o  woman with intractable focal onset epilepsy  She is generally amnestic to her seizures and has a difficult time monitoring the frequency of her seizures  There have been no convulsive type of seizures  Her seizures are significantly impairing and frequent enough that she is not able to hold on to a job  With her epilepsy, she has some mild degree of intellectual disability and memory impairments that can also be contributing to her difficulties  It is hard to say if the addition of lamotrigine (or any medication for that matter) has been able to reduce seizure frequency as she/family has not been reliably tracking her seizure frequency  During this visit, I again expressed that medications may not be able to sufficiently suppress her seizures  Alternative therapies such as epilepsy surgery, VNS, or other neuromodulatory interventions are required  It is difficult to know how well she understands these procedures with significant barriers to her understanding including language translation may not be adequate ( may be substituting words that do not fully align with what I am trying to convey), her intellectual disability, and family may not be able to appreciate the risk and probabilities in achieving seizure reduction rather than seizure freedom  Her prior EEG study suggested both features of focal (left temporal or hemisphere and generalized epileptiform discharges); but her MRI brain study is without structural pathology  At this poitn, I recommend that she is admitted to the epilepsy monitoring unit for presurgical evaluation, which can help with better characterizing her seizures and seizure frequency    With presurgical evaluation, we'll need to reduce her AED to capture 3-5 seizures  I tried to explain that the purpose of EMU study is to determine if her seizures originate from one or more foci or if these are focal versus generalized seizures  She will need to undergo NM PET/CT brain study to better localize potential regions causing seizures  She would not be able to complete neuropsychological evaluation due to language barrier  She may be a candidate for VNS  Plan:   1 - continue with phenobarbital 97 2mg tab take ONE tab at bedtime  2 - increase Lamotrigine to one 200mg tab and two 25mg tabs twice a day (250mg twice a day   3 - repeat EEG study followed by inpatient epilepsy monitoring unit study for presurgical evaluation of focal epilepsy  4 - continue with vitamin D 2000 IU tabs, take one tab daily  5 -  please bring your seizure calendar to the office  6 - referral to NM PET/CT brain metabolic evaluation for presurgical evaluation  7 - follow-up with 3 months  8 - please visit [employer]  com/living-epilepsy/epilepsy-and/hispanics/tratamientos for Greek language discussion about epilepsy treatment options  9 - continue with folic acid 1 mg daily    Problem List Items Addressed This Visit        Nervous and Auditory    Intractable epilepsy without status epilepticus ([facility] [address] ) - Primary    Relevant Medications    Vitamin D, Cholecalciferol, 50 MCG (9148 UT) CAPS    folic acid (FOLVITE) 1 mg tablet    lamoTRIgine (LaMICtal) 200 MG tablet    lamoTRIgine (LaMICtal) 25 mg tablet    PHENobarbital (LUMINAL) 97 2 MG tablet    Other Relevant Orders    EEG awake or drowsy routine    EEG Video Monitoring 24 Hour    NM PET brain metabolic evaluation          Chief Complaint:   Chief Complaint   Patient presents with    Seizures      HPI:    Perlita Bloom Lorri Rashid is a 31 y o  right handed female here for follow-up evaluation of intractable epilepsy  Interval History 9/10/2021  We started with  [id number]    She has weaned off of carbamazepine and increase in lamotrigine, she continues to have episodes of confusion  She has a seizure calendar that documents sporadic episodes of confusion but random dates are put on a single calendar month  Last episode was on 8/30/2021  She does not keep accurate record of her seizures, there were documented events on 7/1, 7/2, 7/14, 8/30  The first  was of poor quality due to excessive static, we had to hang up and call for another  [id number]  Her mother shows me videos of Perlita being quiet, but not having similar repetitive movements, she is quiet looking around, her mother will call her name and ask her what is she doing or where is she but Ynes Pa is confused and does not answer; there are no other stereotype repetitive motor behaviors  She has these episodes at work and her boss would notice that she is inattentive/confused and dismiss her from work  Ynes Pa is not able to report the frequency of these events  Her mother feels that these events are more frequent  There are days that periods of confusion can repeat every 30-40 minutes  Her mother is asking for assistance for Perlita to apply for disability because of her condition and medical waiver for citizenship application  AED/side effects/compliance:  Phenobarbital 97 2mg at bedtime  Lamotrigine 225-225    Event/Seizure semiology:  1  She starts to make a blowing sound, rigid, behavioral arrest, unable to speak, hand tremors, drops things from hands, then she cannot remember what happened (amnestic to seizure)  2  One lifetime convulsion in 2012? 3  FIAS - videos from mother 9/10/2021 - she is quiet, random head movements, does not answer questions, but no consistent behaviors other than being quiet      Woman of childbearing age with Epilepsy:  Contraception: not sexually active  Folic acid supplement:  Yes    Prior Epilepsy History:  Intake History 10/22/2019  She was previously seen by Dr Nicci Christianson at [facility]  From Dr Ricky Oneal office note:  Seizure type 1 - behavioral arrest, tremoulous, unresponsive, amnestic to seizure, ?masticatory movements, blowing air type of sounds  Seizure type 2 - generalized tonic clonic seizure out of sleep  Patient is from the [facility]  She was diagnosed with seizures at an early age in childhood; initially there were staring episodes; mother was unable to report name of medications that were tried  She had generalized convulsions starting at the age of 25  She was initially on Tegretol  Her last visit with Dr Brenda Noble was on 6/27/2019  She continued to have "a few attacks"  After a seizure she will have a headache  Patient's history: obtained using  by telephone  Bhanu Davis is here with her sister  I started to have Perlita give her own history regarding seizures but she was struggling with the telephone  (not sure how to respond to questions)  She looked to her sister for information  Her sister reports that Bhanu Davis started to have seizures about 11 years ago (age 15-16, not during early childhood)  Bhanu Davis has no recollection of when she has a seizure  She did not have seizures as a baby  When she was a child she was "slow" ( used "hidden condition")  When she was born, there was a problem during birth, the doctor was pushing or pulling during the delivery and there was something in the brain that got "disconnected"  Her sister reports that Perlita seizures involve Perlita appearing paralyzed, unable to speak, with hand tremors, rigid and stiff, and forgets what she is doing  If she has something in her hands she just drops it  This seizure can be about a minute and may repeat 3 times within 5 minutes  Maggysilvia Davis has a warning of making a blowing sound with her mouth  Her last seizure was last night  She has seizures about a couple of times a week    There was one seizure that caused her to go into a convulsion (this happened 7 years ago)  She started a new job last week, she could not work because she had a seizure there  She could not hold a job because she had seizures at work  Summary   -800, PB 90 qHS  When Perlita has a seizure she is standing, appearing like she zoned out  The family does not keep track of the seizure frequency  Prior to the last visit with me, she would have about 8 seizures a month  Caroline thinks that Perlita may have been on carbamazepine longer than phenobarbital   We added lamotrigine and started to reduce her dose of carbamazepine  Caroline, her sister, accompanies Rnean Tidwell to her office visits (as assist with [place])  She continued to have seizures  These seizures are still consistent with the Type 1, behavioral arrest and unable to speak  Interval History 3/22/2021  CBZ--400, PB 90, -150  She comes with her sister Evi Nuñez)  Renan Tidwell does not know when she is having a seizure  She lives with her mother and a sister Chinyere Doing)  Her sister Evi Nuñez) was talking to her on the phone the other day, and there was a couple of minutes of silence  When Perlita started to talk again she felt confused and asked what happened  She does not reliably keep track of her seizures so it is difficult to determine the frequency of her seizures  She does not believe there is any seizure reduction with lamotrigine  She has difficulty remembering things (she gets confused)        Special Features  Status epilepticus: No  Self Injury Seizures: No  Precipitating Factors: None  Post-ictal state: amnestic, confusion    Epilepsy Risk Factors:  Abnormal pregnancy: No  Abnormal birth/: No  Abnormal Development: No  Febrile seizures, simple: No  Febrile seizures, complex: No  CNS infection: No  Mental retardation: No  Cerebral palsy: No  Head injury (moderate/severe): No  CNS neoplasm: No  CNS malformation: No  Neurosurgical procedure: No  Stroke: No  Alcohol abuse: No  Drug abuse: No  Family history Sz/epilepsy: No    Prior AEDs:  medication Max dose Time used Reason to stop   Levetiracetam   ?behavioral problem   phenobarbital      carbamazepine      oxcarbazepine      lamotrigine              Prior workup:  x  Imagin2016  MRI brain - [facility]  Normal MR of brain     2019  MRI brain w/wo  Normal MRI brain study -  No pathology was identified in the left frontal or temporal regions  EEGs:  2015 [facility]   Multifocal independent spike-slow waves with phase reversal over the left temporal region T3, T5, Fp1, Fp2, F3 along with generalized spike-slow waves at 1 Hz runs  There are small sharp waves in the bilateral frontal region    -2016 - [facility]  48 hours ambulatory EEG  Generalized spike and waves and polyspike and waves  There is an electrographic seizure but no symptoms were reported  2016 - 11:17 - generalized bifrontal spike-wave complexes with generalized 7-8 Hz activity    3/5/2020  Poorly organized theta/delta slowing and sharply contoured waveforms on the left temporal area  There seems to bifrontal triphasic waves; but on my interpretation of the EEG there are left frontotemporal spike-slow waves  -2020 - 48 hours ambulatory EEG  Frequent left temporal delta/theta activity with left midtemporal sharp waves  Intermittent delta activity, sharp waves over the left frontal anterior temporal region      Labs:  Component      Latest Ref Rng & Units 2021   WBC      4 31 - 10 16 Thousand/uL  6 19   Red Blood Cell Count      3 81 - 5 12 Million/uL  4 75   Hemoglobin      11 5 - 15 4 g/dL  14 4   HCT      34 8 - 46 1 %  44 6   Platelet Count      809 - 390 Thousands/uL  331   Sodium      136 - 145 mmol/L 137 139   Potassium      3 5 - 5 3 mmol/L 4 4 4 2   Chloride      100 - 108 mmol/L 106 103   CO2      21 - 32 mmol/L 28 30   Anion Gap      4 - 13 mmol/L 3 (L) 6   BUN      5 - 25 mg/dL 8 5   Creatinine      0 60 - 1 30 mg/dL 0 61 0 71   Glucose, Random      65 - 140 mg/dL 88 93   Calcium      8 3 - 10 1 mg/dL 9 3 8 9   AST      5 - 45 U/L 15 17   ALT      12 - 78 U/L 26 27   Alkaline Phosphatase      46 - 116 U/L 148 (H) 147 (H)   Total Protein      6 4 - 8 2 g/dL 7 6 7 8   Albumin      3 5 - 5 0 g/dL 3 7 3 9   TOTAL BILIRUBIN      0 20 - 1 00 mg/dL 0 22 0 17 (L)   eGFR      ml/min/1 73sq m 124 116   Vit D, 25-Hydroxy      30 0 - 100 0 ng/mL 5 7 (L) 61 4   LAMOTRIGINE LEVEL      2 0 - 20 0 ug/mL 8 0    PHENOBARBITAL LEVEL      15 0 - 40 0 ug/mL 15 7 18 2       General exam   /80 (BP Location: Right arm, Patient Position: Sitting, Cuff Size: Standard)   Pulse 103   Wt 93 6 kg (206 lb 6 4 oz)   BMI 37 75 kg/m²    Appearance: normally developed, appears well  Carotids: Not assessed  Cardiovascular: regular rate and rhythm and normal heart sounds  Pulmonary: clear to auscultation  Abdominal: obese, nontender, nondistended    HEENT: anicteric   Fundoscopy: not assessed    Mental status  Orientation: alert and oriented to name, place, time  Fund of Knowledge: limited difficult to assess due to language barrier, difficulty with phone  as not all words are correctly translated  Attention and Concentration: intact  Current and Remote Memory:intact  Language: spontaneous speech is normal and comprehension is intact    Cranial Nerves  CN 1: not tested  CN 2: pupils equal round reactive to direct and consenual light   CN 3, 4, 6: EOMI, no nystagmus  CN 5:not assessed  CN 7:not assessed  CN 8:not assessed  CN 9, 10:no dysarthria present  CN 11:symmetric SCM with head turns  CN 12:not assessed    Motor:  Bulk, Tone: normal bulk, normal tone  Pronation: not assessed  Strength: Symmetric strength of the arms and legs, no lateralizing weakness     Sensory:  Lighttouch: intact in all limbs  Romberg:not assessed    Coordination:  FNF:FNF bilaterally intact  PAULINO:slow finger taps on the left and slow finger taps on the right  FFM:intact  Gait/Station:normal gait    Reflexes:  Not assessed    Past Medical/Surgical History:  Patient Active Problem List   Diagnosis    Body mass index (BMI) of 33 0 to 33 9 in adult    Intellectual disability    Intractable epilepsy without status epilepticus ([facility] [address] )    Anticonvulsant drug-induced osteomalacia    Vitamin D deficiency     History reviewed  No pertinent surgical history  Past Psychiatric History:  Depression: Yes  Anxiety: No  Psychosis: No    Medications:    Current Outpatient Medications:     Diclofenac Sodium (VOLTAREN) 1 %, Apply 1 application topically 4 (four) times a day, Disp: , Rfl:     folic acid (FOLVITE) 1 mg tablet, Take 1 tablet (1 mg total) by mouth daily, Disp: 30 tablet, Rfl: 11    lamoTRIgine (LaMICtal) 200 MG tablet, Take 1 tablet (200 mg total) by mouth 2 (two) times a day With two 25mg tabs  , Disp: 60 tablet, Rfl: 5    lamoTRIgine (LaMICtal) 25 mg tablet, Take two tabs by mouth with one 200mg tab twice a day, Disp: 120 tablet, Rfl: 5    PHENobarbital (LUMINAL) 97 2 MG tablet, Take 1 tablet (97 2 mg total) by mouth daily at bedtime, Disp: 30 tablet, Rfl: 5    Vitamin D, Cholecalciferol, 50 MCG (2000 UT) CAPS, Take 1 capsule by mouth daily, Disp: 30 capsule, Rfl: 11    Allergies:  No Known Allergies    Family history:  Family History   Problem Relation Age of Onset    Hypertension Maternal Grandmother     Colon polyps Mother     Glaucoma Mother     Asthma Sister      There is no family history of seizure, epilepsy or developmental delay  Social History  Living situation:  Lives with family  Work:  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, 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  Driving:  No   reports that she has never smoked  She has never used smokeless tobacco  She reports that she does not drink alcohol and does not use drugs      Review of Systems  A review of at least 12 organ/systems was obtained by the medical assistant and reviewed by me, including additional positives/negatives:  Neurological: Positive for tremors and headaches (Daily)  Negative for seizures (at 18 yrs old) and syncope  Psychiatric/Behavioral: Positive for confusion  Memory issues     Decision making was of high-complexity due to the patient's high risk condition (seizures), psychiatric and neuropsychological comorbidities, behavioral problems, memory and cognitive problems and medication side effects  The total amount of time spent with the patient along with pre-chart and post-chart preparation was 70 minutes on the calendar day of the date of service  This included history taking, physical exam, review of ancillary testing, counseling provided to the patient regarding diagnosis, medications, treatment, and risk management, and other communication to the patient's providers and/or family    Start time: 9:20AM  End time: 10:30AM

## 2021-09-10 NOTE — PROGRESS NOTES
Review of Systems   Constitutional: Negative for chills and fever  HENT: Negative for ear pain and sore throat  Eyes: Negative for pain and visual disturbance  Respiratory: Negative for cough and shortness of breath  Cardiovascular: Negative for chest pain and palpitations  Gastrointestinal: Negative for abdominal pain and vomiting  Genitourinary: Negative for dysuria and hematuria  Musculoskeletal: Negative for arthralgias and back pain  Skin: Negative for color change and rash  Neurological: Positive for tremors and headaches (Daily)  Negative for seizures (at 18 yrs old) and syncope  Psychiatric/Behavioral: Positive for confusion  Memory issues   All other systems reviewed and are negative

## 2021-09-10 NOTE — PATIENT INSTRUCTIONS
Plan:   1 - continue with phenobarbital 97 2mg tab take ONE tab at bedtime  2 - increase Lamotrigine to one 200mg tab and two 25mg tabs twice a day (250mg twice a day   3 - repeat EEG study followed by inpatient epilepsy monitoring unit study for presurgical evaluation of focal epilepsy  4 - continue with vitamin D 2000 IU tabs, take one tab daily  5 -  please bring your seizure calendar to the office  6 - referral to NM PET/CT brain metabolic evaluation for presurgical evaluation  7 - follow-up with 3 months  8 - please visit [employer]  com/living-epilepsy/epilepsy-and/hispanics/tratamientos for Estonian language discussion about epilepsy treatment options    9 - continue with folic acid 1 mg daily

## 2021-09-10 NOTE — TELEPHONE ENCOUNTER
----- Message from Rich Odonnell MD sent at 9/10/2021 10:29 AM EDT -----  Regarding: patient is asking for disability and excuse for citizenship testing  Indonesian speaking patient with intractable epilepsy  She has come to the office many times requesting getting disability  She continues to have complex partial seizures that prevents her from holding a job  I cannot get a sense of frequency as she is unable to recognize her seizures  Her mother also ask for assistance regarding application for [place] citizenship to be excused from testing due to her epilepsy  Please find out what the patient requires and if we are able to assist in applying for disability or direct her how to apply for disability      Pat Meeks

## 2021-09-10 NOTE — LETTER
Perlita Merle [facility]  [address] [address]       Glenn Portal no se urban podido comunicar con usted por telefono acerca the informacion para aplicar para el Seg[employer] Social    puede aplicar por el internet a:     [employer]    Si no puedes completar la aplicacion por la internet puedes llamar al [phone], entre las 8:00 a m  y 7:00 p m  Los representantes pueden darles duy jose para aplicar  Otra opcion seria contactar la oficina local del Seguro Social de [employer] localizada en:   [address] [place], [place], [address] [facility]  [phone]      Si tiene Jessica Shaver o pregunta en el cual nuestra [employer] le pueda asistir favor the Esta Began al [phone]        Rip Garcia, MSW   [facility]

## 2021-09-10 NOTE — TELEPHONE ENCOUNTER
# [id number] - LMOM - attempted to called to notify patient that the nurse will be contacting the patient to schedule the EMU visit  I had mixed up the tests at check out and thought that I could schedule the EEG Video Monitoring

## 2021-09-13 ENCOUNTER — TELEPHONE (OUTPATIENT)
Dept: NEUROLOGY | Facility: CLINIC | Age: 31
End: 2021-09-13

## 2021-09-13 DIAGNOSIS — G40.919 INTRACTABLE EPILEPSY WITHOUT STATUS EPILEPTICUS, UNSPECIFIED EPILEPSY TYPE (HCC): Primary | ICD-10-CM

## 2021-09-13 NOTE — TELEPHONE ENCOUNTER
----- Message from Nela Hastings MD sent at 9/11/2021 12:45 AM EDT -----  Regarding: EMU referral  I am referring the patient to the EMU for presurgical evaluation  She still needs a routine EEG study first; try to arrange for EMU, after EEG study is scheduled  She speaks Turkish only so please let the admission coordinator know that an  phone needs to be available      Fred Lee

## 2021-09-13 NOTE — TELEPHONE ENCOUNTER
MSW phoned patient who reported that she is unable to keep a job due to her seizures  She added that she can have a seizure 2 to 3 times a day  Patient reported that she lives with her mother and her cousin and she keeps a log of all of her seizures  She would to avoid taking the test for citizenship due to her epilepsy  She has been a resident since 2014  She would also like to apply for social security benefits  MSW will learn the criteria to apply and will contact her with findings  Patient reported understanding   P

## 2021-09-14 NOTE — TELEPHONE ENCOUNTER
MSW phoned patient to obtain info about work history in relation to [address] disability  She reported that she has had 3 jobs since she moved in to [facility] in 2014    [address]- lasted 5 days  [employer]- 2 days   [employer]	[place]- 1 year  MSW asked patient if she was provided with a date/documentation for citizenship exam/exemption paperwork  Patient reported that she is a resident ([facility]) but has not applied for citizenship  MSW requested that she applies first and they will contact her with information about how to proceed with the exemption process  After she applies, she can contact [facility] to request for the immigration documentation for exemption to be reviewed by the provider  Patient reported understanding

## 2021-09-15 NOTE — TELEPHONE ENCOUNTER
Thank you, please let PACS know that the patient will need  services or  phone available during admission  ADT order signed

## 2021-09-15 NOTE — TELEPHONE ENCOUNTER
Called and discussed with sister  She will schedule routine EEG and call the office back to schedule EMU

## 2021-09-15 NOTE — TELEPHONE ENCOUNTER
Clover Leyden returned call  Scheduled EMU admission for 10/6/2021 8am  ADT21 entered  Added to EMU calendar  EMU information mailed to home  Check list initiated  Pre-cert - EMU admission 10/6/2021 8am  EEG to be done 9/16/2021

## 2021-09-16 NOTE — TELEPHONE ENCOUNTER
Started Gricelda Dixon with Luz Pickens at [facility], pending EZKT#[id number], faxing clinicals to Jen Singh at [phone]

## 2021-09-16 NOTE — TELEPHONE ENCOUNTER
MSW discussed case with Marylou BEVERLY  Social  who reported that patient may be eligible to apply for SSI if she has been a permanent resident for 5+ years, single income cannot exceed $2,000 and  income max $3,000  This is a program from [place] assistance office for individuals who are low income

## 2021-09-17 NOTE — TELEPHONE ENCOUNTER
MSW phoned patient, phone kept ringing no vm option  MSW will attempt to call the patient again to discuss the process to apply for Social Security  Applying for Social Security income  apply online, or if you are unable to complete the application online, you can apply by calling our toll-free number, [phone], between 8:00 a m  and 7:00 p m  representatives can make an appointment for her to apply      Apply online:     [employer]    SS [facility]ul  [address] [facility] [facility], [address] [place] [address]  [phone]

## 2021-09-21 ENCOUNTER — HOSPITAL ENCOUNTER (OUTPATIENT)
Dept: NUCLEAR MEDICINE | Facility: HOSPITAL | Age: 31
Discharge: HOME | End: 2021-09-21
Attending: PSYCHIATRY & NEUROLOGY

## 2021-09-21 DIAGNOSIS — G40.919 INTRACTABLE EPILEPSY WITHOUT STATUS EPILEPTICUS, UNSPECIFIED EPILEPSY TYPE (HCC): ICD-10-CM

## 2021-09-21 NOTE — TELEPHONE ENCOUNTER
MSW phoned patient, a man answered the phone and reported that info can be shared with him as patient is upstairs  MSW reported that she will call patient again

## 2021-09-22 ENCOUNTER — HOSPITAL ENCOUNTER (OUTPATIENT)
Dept: NUCLEAR MEDICINE | Facility: HOSPITAL | Age: 31
Discharge: HOME | End: 2021-09-22
Attending: PSYCHIATRY & NEUROLOGY
Payer: COMMERCIAL

## 2021-09-22 LAB — GLUCOSE SERPL-MCNC: 95 MG/DL (ref 65–140)

## 2021-09-22 PROCEDURE — A9552 F18 FDG: HCPCS

## 2021-09-22 PROCEDURE — 82948 REAGENT STRIP/BLOOD GLUCOSE: CPT

## 2021-09-22 PROCEDURE — G1004 CDSM NDSC: HCPCS

## 2021-09-22 PROCEDURE — 78608 BRAIN IMAGING (PET): CPT

## 2021-09-23 ENCOUNTER — TELEPHONE (OUTPATIENT)
Dept: NEUROLOGY | Facility: CLINIC | Age: 31
End: 2021-09-23

## 2021-09-23 NOTE — TELEPHONE ENCOUNTER
Sister requested date change for EMU due to EEG being rescheduled  Moved to 10/20/2021  Spoke to Kezia in PACS to inform of date change  Pre-cert - just MISHA not sure if anything is needed for auth  Patient is now coming in 10/20/2021

## 2021-09-23 NOTE — TELEPHONE ENCOUNTER
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

## 2021-09-29 NOTE — TELEPHONE ENCOUNTER
Unable to reach letter was sent to patient via mail along with information about how to apply for Social Security Income  MSW remains available for any questions/future social needs

## 2021-10-04 ENCOUNTER — HOSPITAL ENCOUNTER (OUTPATIENT)
Dept: NEUROLOGY | Facility: CLINIC | Age: 31
Discharge: HOME | End: 2021-10-04
Payer: COMMERCIAL

## 2021-10-04 ENCOUNTER — TELEPHONE (OUTPATIENT)
Dept: NEUROLOGY | Facility: CLINIC | Age: 31
End: 2021-10-04

## 2021-10-04 DIAGNOSIS — G40.919 INTRACTABLE EPILEPSY WITHOUT STATUS EPILEPTICUS, UNSPECIFIED EPILEPSY TYPE (HCC): ICD-10-CM

## 2021-10-04 PROCEDURE — 95816 EEG AWAKE AND DROWSY: CPT

## 2021-10-04 PROCEDURE — 95816 EEG AWAKE AND DROWSY: CPT | Performed by: PSYCHIATRY & NEUROLOGY

## 2021-10-05 ENCOUNTER — TELEPHONE (OUTPATIENT)
Dept: NEUROLOGY | Facility: CLINIC | Age: 31
End: 2021-10-05

## 2021-10-15 NOTE — TELEPHONE ENCOUNTER
Spoke to sister Jonas regarding admission  Confirmed admission information  No further questions  ADT21 confirmed  Referral tab checked  Precert will check with insurance if date change on authorization is needed  Check list completed

## 2021-10-15 NOTE — TELEPHONE ENCOUNTER
Spoke with Estle Ban at [facility] advised that DOS changed to 10/26/2021 and auth# remains the same  Ref# Angy 10/15/2021

## 2021-10-15 NOTE — TELEPHONE ENCOUNTER
Zina Pickens is actually 10/20/2021  Just wanted to make sure this was a typo and not the date actually given

## 2021-10-20 ENCOUNTER — APPOINTMENT (INPATIENT)
Dept: NEUROLOGY | Facility: CLINIC | Age: 31
DRG: 053 | End: 2021-10-20
Payer: COMMERCIAL

## 2021-10-20 ENCOUNTER — HOSPITAL ENCOUNTER (INPATIENT)
Facility: HOSPITAL | Age: 31
LOS: 6 days | Discharge: HOME | DRG: 053 | End: 2021-10-26
Attending: PSYCHIATRY & NEUROLOGY | Admitting: PSYCHIATRY & NEUROLOGY
Payer: COMMERCIAL

## 2021-10-20 DIAGNOSIS — G40.919 INTRACTABLE EPILEPSY WITHOUT STATUS EPILEPTICUS, UNSPECIFIED EPILEPSY TYPE (HCC): ICD-10-CM

## 2021-10-20 LAB
ALBUMIN SERPL BCP-MCNC: 3.7 G/DL (ref 3.5–5)
ALP SERPL-CCNC: 136 U/L (ref 46–116)
ALT SERPL W P-5'-P-CCNC: 21 U/L (ref 12–78)
ANION GAP SERPL CALCULATED.3IONS-SCNC: 4 MMOL/L (ref 4–13)
AST SERPL W P-5'-P-CCNC: 14 U/L (ref 5–45)
BASOPHILS # BLD AUTO: 0.08 THOUSANDS/ΜL (ref 0–0.1)
BASOPHILS NFR BLD AUTO: 1 % (ref 0–1)
BILIRUB SERPL-MCNC: 0.15 MG/DL (ref 0.2–1)
BUN SERPL-MCNC: 9 MG/DL (ref 5–25)
CALCIUM SERPL-MCNC: 9.1 MG/DL (ref 8.3–10.1)
CHLORIDE SERPL-SCNC: 107 MMOL/L (ref 100–108)
CO2 SERPL-SCNC: 27 MMOL/L (ref 21–32)
CREAT SERPL-MCNC: 0.66 MG/DL (ref 0.6–1.3)
EOSINOPHIL # BLD AUTO: 0.31 THOUSAND/ΜL (ref 0–0.61)
EOSINOPHIL NFR BLD AUTO: 5 % (ref 0–6)
ERYTHROCYTE [DISTWIDTH] IN BLOOD BY AUTOMATED COUNT: 12.9 % (ref 11.6–15.1)
GFR SERPL CREATININE-BSD FRML MDRD: 121 ML/MIN/1.73SQ M
GLUCOSE SERPL-MCNC: 71 MG/DL (ref 65–140)
HCG SERPL QL: NEGATIVE
HCT VFR BLD AUTO: 45 % (ref 34.8–46.1)
HGB BLD-MCNC: 14.9 G/DL (ref 11.5–15.4)
IMM GRANULOCYTES # BLD AUTO: 0.02 THOUSAND/UL (ref 0–0.2)
IMM GRANULOCYTES NFR BLD AUTO: 0 % (ref 0–2)
LYMPHOCYTES # BLD AUTO: 2.44 THOUSANDS/ΜL (ref 0.6–4.47)
LYMPHOCYTES NFR BLD AUTO: 38 % (ref 14–44)
MCH RBC QN AUTO: 30.5 PG (ref 26.8–34.3)
MCHC RBC AUTO-ENTMCNC: 33.1 G/DL (ref 31.4–37.4)
MCV RBC AUTO: 92 FL (ref 82–98)
MONOCYTES # BLD AUTO: 0.69 THOUSAND/ΜL (ref 0.17–1.22)
MONOCYTES NFR BLD AUTO: 11 % (ref 4–12)
NEUTROPHILS # BLD AUTO: 2.81 THOUSANDS/ΜL (ref 1.85–7.62)
NEUTS SEG NFR BLD AUTO: 45 % (ref 43–75)
NRBC BLD AUTO-RTO: 0 /100 WBCS
PHENOBARB SERPL-MCNC: 15.7 UG/ML (ref 15–40)
PLATELET # BLD AUTO: 363 THOUSANDS/UL (ref 149–390)
PMV BLD AUTO: 9.4 FL (ref 8.9–12.7)
POTASSIUM SERPL-SCNC: 3.5 MMOL/L (ref 3.5–5.3)
PROT SERPL-MCNC: 8.1 G/DL (ref 6.4–8.2)
RBC # BLD AUTO: 4.89 MILLION/UL (ref 3.81–5.12)
SODIUM SERPL-SCNC: 138 MMOL/L (ref 136–145)
WBC # BLD AUTO: 6.35 THOUSAND/UL (ref 4.31–10.16)

## 2021-10-20 PROCEDURE — 80175 DRUG SCREEN QUAN LAMOTRIGINE: CPT | Performed by: NURSE PRACTITIONER

## 2021-10-20 PROCEDURE — 95715 VEEG EA 12-26HR INTMT MNTR: CPT

## 2021-10-20 PROCEDURE — 99223 1ST HOSP IP/OBS HIGH 75: CPT | Performed by: PSYCHIATRY & NEUROLOGY

## 2021-10-20 PROCEDURE — 80053 COMPREHEN METABOLIC PANEL: CPT | Performed by: NURSE PRACTITIONER

## 2021-10-20 PROCEDURE — 95700 EEG CONT REC W/VID EEG TECH: CPT

## 2021-10-20 PROCEDURE — 85025 COMPLETE CBC W/AUTO DIFF WBC: CPT | Performed by: NURSE PRACTITIONER

## 2021-10-20 PROCEDURE — 80184 ASSAY OF PHENOBARBITAL: CPT | Performed by: NURSE PRACTITIONER

## 2021-10-20 PROCEDURE — 84703 CHORIONIC GONADOTROPIN ASSAY: CPT | Performed by: NURSE PRACTITIONER

## 2021-10-20 RX ORDER — LORAZEPAM 2 MG/ML
2 INJECTION INTRAMUSCULAR EVERY 8 HOURS PRN
Status: DISCONTINUED | OUTPATIENT
Start: 2021-10-20 | End: 2021-10-26 | Stop reason: HOSPADM

## 2021-10-20 RX ORDER — PHENOBARBITAL 64.8 MG/1
97.2 TABLET ORAL
Status: DISCONTINUED | OUTPATIENT
Start: 2021-10-20 | End: 2021-10-20

## 2021-10-20 RX ORDER — POLYETHYLENE GLYCOL 3350 17 G/17G
17 POWDER, FOR SOLUTION ORAL DAILY PRN
Status: DISCONTINUED | OUTPATIENT
Start: 2021-10-20 | End: 2021-10-26 | Stop reason: HOSPADM

## 2021-10-20 RX ORDER — FOLIC ACID 1 MG/1
1 TABLET ORAL DAILY
Status: DISCONTINUED | OUTPATIENT
Start: 2021-10-21 | End: 2021-10-21

## 2021-10-20 RX ORDER — MELATONIN
2000 DAILY
Status: DISCONTINUED | OUTPATIENT
Start: 2021-10-21 | End: 2021-10-20

## 2021-10-20 RX ORDER — DIPHENHYDRAMINE HCL 25 MG
25 TABLET ORAL EVERY 6 HOURS PRN
Status: DISCONTINUED | OUTPATIENT
Start: 2021-10-20 | End: 2021-10-26 | Stop reason: HOSPADM

## 2021-10-20 RX ORDER — ONDANSETRON 2 MG/ML
4 INJECTION INTRAMUSCULAR; INTRAVENOUS EVERY 6 HOURS PRN
Status: DISCONTINUED | OUTPATIENT
Start: 2021-10-20 | End: 2021-10-26 | Stop reason: HOSPADM

## 2021-10-20 RX ORDER — ACETAMINOPHEN 325 MG/1
650 TABLET ORAL EVERY 6 HOURS PRN
Status: DISCONTINUED | OUTPATIENT
Start: 2021-10-20 | End: 2021-10-26 | Stop reason: HOSPADM

## 2021-10-20 RX ORDER — MELATONIN
2000 DAILY
Status: DISCONTINUED | OUTPATIENT
Start: 2021-10-21 | End: 2021-10-21

## 2021-10-20 RX ORDER — PHENOBARBITAL 64.8 MG/1
64.8 TABLET ORAL
Status: DISCONTINUED | OUTPATIENT
Start: 2021-10-20 | End: 2021-10-24

## 2021-10-20 RX ADMIN — PHENOBARBITAL 64.8 MG: 64.8 TABLET ORAL at 22:00

## 2021-10-20 RX ADMIN — LAMOTRIGINE 175 MG: 100 TABLET ORAL at 19:50

## 2021-10-21 ENCOUNTER — APPOINTMENT (INPATIENT)
Dept: NEUROLOGY | Facility: CLINIC | Age: 31
DRG: 053 | End: 2021-10-21
Payer: COMMERCIAL

## 2021-10-21 PROCEDURE — 99232 SBSQ HOSP IP/OBS MODERATE 35: CPT | Performed by: PSYCHIATRY & NEUROLOGY

## 2021-10-21 PROCEDURE — 95720 EEG PHY/QHP EA INCR W/VEEG: CPT | Performed by: PSYCHIATRY & NEUROLOGY

## 2021-10-21 PROCEDURE — 95715 VEEG EA 12-26HR INTMT MNTR: CPT

## 2021-10-21 RX ORDER — LAMOTRIGINE 100 MG/1
100 TABLET ORAL 2 TIMES DAILY
Status: DISCONTINUED | OUTPATIENT
Start: 2021-10-21 | End: 2021-10-22

## 2021-10-21 RX ADMIN — FOLIC ACID 1 MG: 1 TABLET ORAL at 08:08

## 2021-10-21 RX ADMIN — ENOXAPARIN SODIUM 40 MG: 40 INJECTION SUBCUTANEOUS at 08:08

## 2021-10-21 RX ADMIN — LAMOTRIGINE 175 MG: 100 TABLET ORAL at 08:08

## 2021-10-21 RX ADMIN — PHENOBARBITAL 64.8 MG: 64.8 TABLET ORAL at 22:28

## 2021-10-21 RX ADMIN — LAMOTRIGINE 100 MG: 100 TABLET ORAL at 22:28

## 2021-10-21 RX ADMIN — Medication 2000 UNITS: at 08:08

## 2021-10-22 ENCOUNTER — APPOINTMENT (INPATIENT)
Dept: NEUROLOGY | Facility: CLINIC | Age: 31
DRG: 053 | End: 2021-10-22
Payer: COMMERCIAL

## 2021-10-22 LAB — LAMOTRIGINE SERPL-MCNC: 9.2 UG/ML (ref 2–20)

## 2021-10-22 PROCEDURE — 95715 VEEG EA 12-26HR INTMT MNTR: CPT

## 2021-10-22 PROCEDURE — 99231 SBSQ HOSP IP/OBS SF/LOW 25: CPT | Performed by: PSYCHIATRY & NEUROLOGY

## 2021-10-22 PROCEDURE — 95720 EEG PHY/QHP EA INCR W/VEEG: CPT | Performed by: PSYCHIATRY & NEUROLOGY

## 2021-10-22 RX ADMIN — LAMOTRIGINE 100 MG: 100 TABLET ORAL at 08:49

## 2021-10-22 RX ADMIN — ENOXAPARIN SODIUM 40 MG: 40 INJECTION SUBCUTANEOUS at 08:49

## 2021-10-22 RX ADMIN — PHENOBARBITAL 64.8 MG: 64.8 TABLET ORAL at 21:31

## 2021-10-23 ENCOUNTER — APPOINTMENT (INPATIENT)
Dept: NEUROLOGY | Facility: CLINIC | Age: 31
DRG: 053 | End: 2021-10-23
Payer: COMMERCIAL

## 2021-10-23 PROCEDURE — 99233 SBSQ HOSP IP/OBS HIGH 50: CPT | Performed by: PSYCHIATRY & NEUROLOGY

## 2021-10-23 PROCEDURE — 95720 EEG PHY/QHP EA INCR W/VEEG: CPT | Performed by: PSYCHIATRY & NEUROLOGY

## 2021-10-23 PROCEDURE — 95715 VEEG EA 12-26HR INTMT MNTR: CPT

## 2021-10-23 RX ADMIN — ENOXAPARIN SODIUM 40 MG: 40 INJECTION SUBCUTANEOUS at 09:19

## 2021-10-23 RX ADMIN — PHENOBARBITAL 64.8 MG: 64.8 TABLET ORAL at 21:53

## 2021-10-24 ENCOUNTER — APPOINTMENT (INPATIENT)
Dept: NEUROLOGY | Facility: CLINIC | Age: 31
DRG: 053 | End: 2021-10-24
Payer: COMMERCIAL

## 2021-10-24 LAB
PLATELET # BLD AUTO: 394 THOUSANDS/UL (ref 149–390)
PMV BLD AUTO: 9.2 FL (ref 8.9–12.7)

## 2021-10-24 PROCEDURE — 95715 VEEG EA 12-26HR INTMT MNTR: CPT

## 2021-10-24 PROCEDURE — 95720 EEG PHY/QHP EA INCR W/VEEG: CPT | Performed by: PSYCHIATRY & NEUROLOGY

## 2021-10-24 PROCEDURE — 85049 AUTOMATED PLATELET COUNT: CPT | Performed by: NURSE PRACTITIONER

## 2021-10-24 PROCEDURE — 99233 SBSQ HOSP IP/OBS HIGH 50: CPT | Performed by: PSYCHIATRY & NEUROLOGY

## 2021-10-24 PROCEDURE — 93005 ELECTROCARDIOGRAM TRACING: CPT

## 2021-10-24 RX ORDER — PHENOBARBITAL 64.8 MG/1
97.2 TABLET ORAL
Status: DISCONTINUED | OUTPATIENT
Start: 2021-10-24 | End: 2021-10-26 | Stop reason: HOSPADM

## 2021-10-24 RX ORDER — BACITRACIN, NEOMYCIN, POLYMYXIN B 400; 3.5; 5 [USP'U]/G; MG/G; [USP'U]/G
1 OINTMENT TOPICAL 2 TIMES DAILY
Status: DISCONTINUED | OUTPATIENT
Start: 2021-10-24 | End: 2021-10-26 | Stop reason: HOSPADM

## 2021-10-24 RX ADMIN — PHENOBARBITAL 97.2 MG: 64.8 TABLET ORAL at 22:07

## 2021-10-24 RX ADMIN — ENOXAPARIN SODIUM 40 MG: 40 INJECTION SUBCUTANEOUS at 09:46

## 2021-10-25 ENCOUNTER — APPOINTMENT (INPATIENT)
Dept: NEUROLOGY | Facility: CLINIC | Age: 31
DRG: 053 | End: 2021-10-25
Payer: COMMERCIAL

## 2021-10-25 LAB
ATRIAL RATE: 94 BPM
P AXIS: 69 DEGREES
PR INTERVAL: 116 MS
QRS AXIS: 51 DEGREES
QRSD INTERVAL: 72 MS
QT INTERVAL: 334 MS
QTC INTERVAL: 417 MS
T WAVE AXIS: -17 DEGREES
VENTRICULAR RATE: 94 BPM

## 2021-10-25 PROCEDURE — 99232 SBSQ HOSP IP/OBS MODERATE 35: CPT | Performed by: NURSE PRACTITIONER

## 2021-10-25 PROCEDURE — 95715 VEEG EA 12-26HR INTMT MNTR: CPT

## 2021-10-25 PROCEDURE — 93010 ELECTROCARDIOGRAM REPORT: CPT | Performed by: INTERNAL MEDICINE

## 2021-10-25 PROCEDURE — 95720 EEG PHY/QHP EA INCR W/VEEG: CPT | Performed by: PSYCHIATRY & NEUROLOGY

## 2021-10-25 RX ADMIN — PHENOBARBITAL 97.2 MG: 64.8 TABLET ORAL at 21:02

## 2021-10-25 RX ADMIN — ENOXAPARIN SODIUM 40 MG: 40 INJECTION SUBCUTANEOUS at 08:33

## 2021-10-25 RX ADMIN — LAMOTRIGINE 250 MG: 100 TABLET ORAL at 17:10

## 2021-10-26 VITALS
RESPIRATION RATE: 12 BRPM | BODY MASS INDEX: 37.53 KG/M2 | WEIGHT: 203.93 LBS | OXYGEN SATURATION: 95 % | DIASTOLIC BLOOD PRESSURE: 61 MMHG | TEMPERATURE: 98 F | HEIGHT: 62 IN | SYSTOLIC BLOOD PRESSURE: 100 MMHG | HEART RATE: 76 BPM

## 2021-10-26 LAB
PLATELET # BLD AUTO: 340 THOUSANDS/UL (ref 149–390)
PMV BLD AUTO: 9.4 FL (ref 8.9–12.7)

## 2021-10-26 PROCEDURE — 95719 EEG PHYS/QHP EA INCR W/O VID: CPT | Performed by: STUDENT IN AN ORGANIZED HEALTH CARE EDUCATION/TRAINING PROGRAM

## 2021-10-26 PROCEDURE — 85049 AUTOMATED PLATELET COUNT: CPT | Performed by: NURSE PRACTITIONER

## 2021-10-26 PROCEDURE — 99239 HOSP IP/OBS DSCHRG MGMT >30: CPT | Performed by: STUDENT IN AN ORGANIZED HEALTH CARE EDUCATION/TRAINING PROGRAM

## 2021-10-26 RX ORDER — LAMOTRIGINE 200 MG/1
TABLET ORAL
Qty: 90 TABLET | Refills: 5 | Status: SHIPPED | OUTPATIENT
Start: 2021-10-26 | End: 2022-01-28 | Stop reason: SDUPTHER

## 2021-10-26 RX ADMIN — BACITRACIN ZINC, NEOMYCIN, POLYMYXIN B 1 SMALL APPLICATION: 400; 3.5; 5 OINTMENT TOPICAL at 09:38

## 2021-10-26 RX ADMIN — LAMOTRIGINE 250 MG: 100 TABLET ORAL at 09:31

## 2021-10-27 PROBLEM — G40.119 INTRACTABLE FOCAL EPILEPSY (HCC): Status: ACTIVE | Noted: 2019-10-22

## 2021-10-29 ENCOUNTER — TELEPHONE (OUTPATIENT)
Dept: NEUROLOGY | Facility: CLINIC | Age: 31
End: 2021-10-29

## 2022-01-15 ENCOUNTER — HOSPITAL ENCOUNTER (EMERGENCY)
Facility: HOSPITAL | Age: 32
Discharge: HOME | End: 2022-01-16
Attending: EMERGENCY MEDICINE
Payer: MEDICARE

## 2022-01-15 ENCOUNTER — APPOINTMENT (EMERGENCY)
Dept: RADIOLOGY | Facility: HOSPITAL | Age: 32
End: 2022-01-15
Payer: MEDICARE

## 2022-01-15 DIAGNOSIS — R07.9 CHEST PAIN, UNSPECIFIED TYPE: ICD-10-CM

## 2022-01-15 DIAGNOSIS — R51.9 ACUTE HEADACHE: Primary | ICD-10-CM

## 2022-01-15 LAB
BASOPHILS # BLD AUTO: 0.07 THOUSANDS/ΜL (ref 0–0.1)
BASOPHILS NFR BLD AUTO: 1 % (ref 0–1)
BILIRUB UR QL STRIP: NEGATIVE
CLARITY UR: CLEAR
COLOR UR: YELLOW
EOSINOPHIL # BLD AUTO: 0.13 THOUSAND/ΜL (ref 0–0.61)
EOSINOPHIL NFR BLD AUTO: 2 % (ref 0–6)
ERYTHROCYTE [DISTWIDTH] IN BLOOD BY AUTOMATED COUNT: 12.7 % (ref 11.6–15.1)
EXT PREG TEST URINE: NEGATIVE
EXT. CONTROL ED NAV: NORMAL
GLUCOSE UR STRIP-MCNC: NEGATIVE MG/DL
HCT VFR BLD AUTO: 44 % (ref 34.8–46.1)
HGB BLD-MCNC: 14.5 G/DL (ref 11.5–15.4)
HGB UR QL STRIP.AUTO: ABNORMAL
IMM GRANULOCYTES # BLD AUTO: 0.01 THOUSAND/UL (ref 0–0.2)
IMM GRANULOCYTES NFR BLD AUTO: 0 % (ref 0–2)
KETONES UR STRIP-MCNC: NEGATIVE MG/DL
LEUKOCYTE ESTERASE UR QL STRIP: NEGATIVE
LYMPHOCYTES # BLD AUTO: 2.56 THOUSANDS/ΜL (ref 0.6–4.47)
LYMPHOCYTES NFR BLD AUTO: 41 % (ref 14–44)
MCH RBC QN AUTO: 30.4 PG (ref 26.8–34.3)
MCHC RBC AUTO-ENTMCNC: 33 G/DL (ref 31.4–37.4)
MCV RBC AUTO: 92 FL (ref 82–98)
MONOCYTES # BLD AUTO: 0.6 THOUSAND/ΜL (ref 0.17–1.22)
MONOCYTES NFR BLD AUTO: 10 % (ref 4–12)
NEUTROPHILS # BLD AUTO: 2.92 THOUSANDS/ΜL (ref 1.85–7.62)
NEUTS SEG NFR BLD AUTO: 46 % (ref 43–75)
NITRITE UR QL STRIP: NEGATIVE
NRBC BLD AUTO-RTO: 0 /100 WBCS
PH UR STRIP.AUTO: 7 [PH] (ref 4.5–8)
PLATELET # BLD AUTO: 372 THOUSANDS/UL (ref 149–390)
PMV BLD AUTO: 9.3 FL (ref 8.9–12.7)
PROT UR STRIP-MCNC: NEGATIVE MG/DL
RBC # BLD AUTO: 4.77 MILLION/UL (ref 3.81–5.12)
SP GR UR STRIP.AUTO: 1.02 (ref 1–1.03)
UROBILINOGEN UR QL STRIP.AUTO: 0.2 E.U./DL
WBC # BLD AUTO: 6.29 THOUSAND/UL (ref 4.31–10.16)

## 2022-01-15 PROCEDURE — 93005 ELECTROCARDIOGRAM TRACING: CPT

## 2022-01-15 PROCEDURE — 80053 COMPREHEN METABOLIC PANEL: CPT | Performed by: EMERGENCY MEDICINE

## 2022-01-15 PROCEDURE — 36415 COLL VENOUS BLD VENIPUNCTURE: CPT | Performed by: EMERGENCY MEDICINE

## 2022-01-15 PROCEDURE — 80184 ASSAY OF PHENOBARBITAL: CPT | Performed by: EMERGENCY MEDICINE

## 2022-01-15 PROCEDURE — 96375 TX/PRO/DX INJ NEW DRUG ADDON: CPT

## 2022-01-15 PROCEDURE — 84484 ASSAY OF TROPONIN QUANT: CPT | Performed by: EMERGENCY MEDICINE

## 2022-01-15 PROCEDURE — 96374 THER/PROPH/DIAG INJ IV PUSH: CPT

## 2022-01-15 PROCEDURE — 71045 X-RAY EXAM CHEST 1 VIEW: CPT

## 2022-01-15 PROCEDURE — 81025 URINE PREGNANCY TEST: CPT | Performed by: EMERGENCY MEDICINE

## 2022-01-15 PROCEDURE — 96361 HYDRATE IV INFUSION ADD-ON: CPT

## 2022-01-15 PROCEDURE — 81001 URINALYSIS AUTO W/SCOPE: CPT

## 2022-01-15 PROCEDURE — 99285 EMERGENCY DEPT VISIT HI MDM: CPT | Performed by: EMERGENCY MEDICINE

## 2022-01-15 PROCEDURE — 99284 EMERGENCY DEPT VISIT MOD MDM: CPT

## 2022-01-15 PROCEDURE — 85025 COMPLETE CBC W/AUTO DIFF WBC: CPT | Performed by: EMERGENCY MEDICINE

## 2022-01-15 RX ORDER — METOCLOPRAMIDE HYDROCHLORIDE 5 MG/ML
10 INJECTION INTRAMUSCULAR; INTRAVENOUS ONCE
Status: COMPLETED | OUTPATIENT
Start: 2022-01-15 | End: 2022-01-15

## 2022-01-15 RX ORDER — KETOROLAC TROMETHAMINE 30 MG/ML
15 INJECTION, SOLUTION INTRAMUSCULAR; INTRAVENOUS ONCE
Status: COMPLETED | OUTPATIENT
Start: 2022-01-15 | End: 2022-01-15

## 2022-01-15 RX ORDER — MECLIZINE HCL 12.5 MG/1
25 TABLET ORAL ONCE
Status: COMPLETED | OUTPATIENT
Start: 2022-01-15 | End: 2022-01-16

## 2022-01-15 RX ADMIN — KETOROLAC TROMETHAMINE 15 MG: 30 INJECTION, SOLUTION INTRAMUSCULAR; INTRAVENOUS at 23:58

## 2022-01-15 RX ADMIN — METOCLOPRAMIDE 10 MG: 5 INJECTION, SOLUTION INTRAMUSCULAR; INTRAVENOUS at 23:59

## 2022-01-15 RX ADMIN — SODIUM CHLORIDE 1000 ML: 0.9 INJECTION, SOLUTION INTRAVENOUS at 23:57

## 2022-01-16 VITALS
SYSTOLIC BLOOD PRESSURE: 133 MMHG | DIASTOLIC BLOOD PRESSURE: 87 MMHG | RESPIRATION RATE: 18 BRPM | HEART RATE: 87 BPM | TEMPERATURE: 97.6 F | OXYGEN SATURATION: 100 %

## 2022-01-16 LAB
ALBUMIN SERPL BCP-MCNC: 4.3 G/DL (ref 3.5–5)
ALP SERPL-CCNC: 139 U/L (ref 46–116)
ALT SERPL W P-5'-P-CCNC: 26 U/L (ref 12–78)
ANION GAP SERPL CALCULATED.3IONS-SCNC: 8 MMOL/L (ref 4–13)
AST SERPL W P-5'-P-CCNC: 18 U/L (ref 5–45)
BACTERIA UR QL AUTO: ABNORMAL /HPF
BILIRUB SERPL-MCNC: 0.22 MG/DL (ref 0.2–1)
BUN SERPL-MCNC: 7 MG/DL (ref 5–25)
CALCIUM SERPL-MCNC: 8.8 MG/DL (ref 8.3–10.1)
CARDIAC TROPONIN I PNL SERPL HS: <2 NG/L
CHLORIDE SERPL-SCNC: 103 MMOL/L (ref 100–108)
CO2 SERPL-SCNC: 28 MMOL/L (ref 21–32)
CREAT SERPL-MCNC: 0.73 MG/DL (ref 0.6–1.3)
GFR SERPL CREATININE-BSD FRML MDRD: 111 ML/MIN/1.73SQ M
GLUCOSE SERPL-MCNC: 102 MG/DL (ref 65–140)
MUCOUS THREADS UR QL AUTO: ABNORMAL
NON-SQ EPI CELLS URNS QL MICRO: ABNORMAL /HPF
PHENOBARB SERPL-MCNC: 15.1 UG/ML (ref 15–40)
POTASSIUM SERPL-SCNC: 4.2 MMOL/L (ref 3.5–5.3)
PROT SERPL-MCNC: 8.2 G/DL (ref 6.4–8.2)
RBC #/AREA URNS AUTO: ABNORMAL /HPF
SODIUM SERPL-SCNC: 139 MMOL/L (ref 136–145)
WBC #/AREA URNS AUTO: ABNORMAL /HPF

## 2022-01-16 RX ADMIN — MECLIZINE 25 MG: 12.5 TABLET ORAL at 00:01

## 2022-01-16 NOTE — DISCHARGE INSTRUCTIONS
You have been seen for headaches and chest pain  Please take tylenol and motrin as needed if your symptoms return  Return to the emergency department if you develop worsening chest pain, trouble breathing, vomiting, intractable headaches, weakness or any other symptoms of concern  Please follow up with your PCP by calling the number provided

## 2022-01-16 NOTE — ED ATTENDING ATTESTATION
1/15/2022  I, Babak Meadows MD, saw and evaluated the patient  I have discussed the patient with the resident/non-physician practitioner and agree with the resident's/non-physician practitioner's findings, Plan of Care, and MDM as documented in the resident's/non-physician practitioner's note, except where noted  All available labs and Radiology studies were reviewed  I was present for key portions of any procedure(s) performed by the resident/non-physician practitioner and I was immediately available to provide assistance  At this point I agree with the current assessment done in the Emergency Department  I have conducted an independent evaluation of this patient a history and physical is as follows:    37 y/o Faroese speaking female with hx provided using  services  Pt reports she has had multiple episodes of dizziness over the past several days with nondescript chest pain  Pt states that dizziness is more accurately described as lightheadedness  Pt reports episode this evening associated with tunnel vision and ha similar to prior headaches  No hx/exam findings to suggest pe  10 systems reviewed and otherwise neg  On exam no distress, lungs nml, cardiac nml, abd nml, neuro nml, neck nml   MDM: will do cardiac work up, check phenobarb level, upreg    ED Course         Critical Care Time  Procedures

## 2022-01-16 NOTE — ED PROVIDER NOTES
History  Chief Complaint   Patient presents with    Loss of Vision     Patient reports loss of vision in both eyes, reports 3 days of dizziness  Khadra Ramirez is a 36y o  year old female with PMH of epilepsy on phenobarbital presenting to the [facility] ED for dizziness, headaches and chest pain  Patient reports intermittent episodes of dizziness over the past 3 days  Patient felt lightheaded when walking up and down the stairs for the last three days  This evening patient had gradually worsening headache and felt that objects on the television were "coming closer and then moving away"  Patient denies daly loss of vision contrary to triage note  No focal weakness/numbness/tingling in extremities  Patient also reports intermittent chest pain for the past several days  Patient denies dyspnea, cough, N/V/D or abdominal pain  Patient has not taken/received any medications at home for relief of symptoms  History provided by:  Patient, medical records and relative   used: Yes        Prior to Admission Medications   Prescriptions Last Dose Informant Patient Reported? Taking? PHENobarbital (LUMINAL) 97 2 MG tablet   No No   Sig: Take 1 tablet (97 2 mg total) by mouth daily at bedtime   Vitamin D, Cholecalciferol, 50 MCG (2000 UT) CAPS   No No   Sig: Take 1 capsule by mouth daily   folic acid (FOLVITE) 1 mg tablet   No No   Sig: Take 1 tablet (1 mg total) by mouth daily   Patient not taking: Reported on 10/20/2021   lamoTRIgine (LaMICtal) 200 MG tablet   No No   Sig: Take one tablet with two 25 mg tablets (250 mg total) in the morning and one and a half 200 mg tablets (300 mg total) at night for 2 weeks  Then take one and a half 200 mg tablets (300 mg total) twice per day  Facility-Administered Medications: None       Past Medical History:   Diagnosis Date    Seizures ([facility] [address] )        History reviewed  No pertinent surgical history      Family History   Problem Relation Age of Onset    Hypertension Maternal Grandmother     Colon polyps Mother     Glaucoma Mother     Asthma Sister      I have reviewed and agree with the history as documented  E-Cigarette/Vaping     E-Cigarette/Vaping Substances     Social History     Tobacco Use    Smoking status: Never Smoker    Smokeless tobacco: Never Used   Substance Use Topics    Alcohol use: Never    Drug use: Never        Review of Systems   Constitutional: Negative for chills and fever  HENT: Negative for congestion and rhinorrhea  Eyes: Positive for visual disturbance  Negative for photophobia  Respiratory: Negative for cough and shortness of breath  Cardiovascular: Negative for chest pain and leg swelling  Gastrointestinal: Negative for abdominal distention, abdominal pain, constipation, diarrhea, nausea and vomiting  Endocrine: Negative for polyuria  Genitourinary: Negative for difficulty urinating, dysuria, flank pain and hematuria  Musculoskeletal: Negative for arthralgias  Skin: Negative for rash  Neurological: Positive for dizziness, light-headedness and headaches  Negative for seizures, syncope, facial asymmetry, speech difficulty, weakness and numbness  Psychiatric/Behavioral: Negative for behavioral problems and confusion  All other systems reviewed and are negative  Physical Exam  ED Triage Vitals [01/15/22 2232]   Temperature Pulse Respirations Blood Pressure SpO2   97 6 °F (36 4 °C) 101 16 141/88 100 %      Temp Source Heart Rate Source Patient Position - Orthostatic VS BP Location FiO2 (%)   Oral Monitor Sitting Left arm --      Pain Score       10 - Worst Possible Pain             Orthostatic Vital Signs  Vitals:    01/15/22 2232 01/16/22 0039   BP: 141/88 133/87   Pulse: 101 87   Patient Position - Orthostatic VS: Sitting Sitting       Physical Exam  Vitals and nursing note reviewed  Constitutional:       General: She is not in acute distress  Appearance: Normal appearance   She is well-developed  She is not ill-appearing, toxic-appearing or diaphoretic  HENT:      Head: Normocephalic and atraumatic  Nose: No congestion or rhinorrhea  Eyes:      General:         Right eye: No discharge  Left eye: No discharge  Cardiovascular:      Rate and Rhythm: Normal rate and regular rhythm  Pulmonary:      Effort: Pulmonary effort is normal  No accessory muscle usage or respiratory distress  Breath sounds: Normal breath sounds  No stridor  No decreased breath sounds, wheezing, rhonchi or rales  Abdominal:      General: Bowel sounds are normal  There is no distension  Palpations: Abdomen is soft  Tenderness: There is no abdominal tenderness  There is no guarding or rebound  Musculoskeletal:      Cervical back: Normal range of motion and neck supple  No rigidity  Right lower leg: No tenderness  No edema  Left lower leg: No tenderness  No edema  Skin:     Capillary Refill: Capillary refill takes less than 2 seconds  Findings: No rash  Neurological:      Mental Status: She is alert and oriented to person, place, and time  GCS: GCS eye subscore is 4  GCS verbal subscore is 5  GCS motor subscore is 6  Cranial Nerves: No cranial nerve deficit, dysarthria or facial asymmetry  Motor: No weakness, tremor or seizure activity  Gait: Gait normal       Comments: Strength +5/5 in bilateral UE/LE  No vertical nystagmus noted  CN III, IV and VI intact  Cerebellar testing: Normal FNF without ataxia  No pronator drift noted     Psychiatric:         Mood and Affect: Mood normal          Behavior: Behavior normal          ED Medications  Medications   sodium chloride 0 9 % bolus 1,000 mL (0 mL Intravenous Stopped 1/16/22 0044)   metoclopramide (REGLAN) injection 10 mg (10 mg Intravenous Given 1/15/22 2277)   ketorolac (TORADOL) injection 15 mg (15 mg Intravenous Given 1/15/22 2962)   meclizine (ANTIVERT) tablet 25 mg (25 mg Oral Given 1/16/22 0001)       Diagnostic Studies  Results Reviewed     Procedure Component Value Units Date/Time    Urine Microscopic [[id number]]  (Abnormal) Collected: 01/15/22 2349    Lab Status: Final result Specimen: Urine, Clean Catch Updated: 01/16/22 0110     RBC, UA 2-4 /hpf      WBC, UA 1-2 /hpf      Epithelial Cells Moderate /hpf      Bacteria, UA Innumerable /hpf      MUCUS THREADS Occasional    HS Troponin 0hr (reflex protocol) [[id number]]  (Normal) Collected: 01/15/22 2344    Lab Status: Final result Specimen: Blood from Arm, Left Updated: 01/16/22 0017     hs TnI 0hr <2 ng/L     Comprehensive metabolic panel [[id number]]  (Abnormal) Collected: 01/15/22 2344    Lab Status: Final result Specimen: Blood from Arm, Left Updated: 01/16/22 0017     Sodium 139 mmol/L      Potassium 4 2 mmol/L      Chloride 103 mmol/L      CO2 28 mmol/L      ANION GAP 8 mmol/L      BUN 7 mg/dL      Creatinine 0 73 mg/dL      Glucose 102 mg/dL      Calcium 8 8 mg/dL      AST 18 U/L      ALT 26 U/L      Alkaline Phosphatase 139 U/L      Total Protein 8 2 g/dL      Albumin 4 3 g/dL      Total Bilirubin 0 22 mg/dL      eGFR 111 ml/min/1 73sq m     Narrative:      [place] guidelines for Chronic Kidney Disease (CKD):     Stage 1 with normal or high GFR (GFR > 90 mL/min/1 73 square meters)    Stage 2 Mild CKD (GFR = 60-89 mL/min/1 73 square meters)    Stage 3A Moderate CKD (GFR = 45-59 mL/min/1 73 square meters)    Stage 3B Moderate CKD (GFR = 30-44 mL/min/1 73 square meters)    Stage 4 Severe CKD (GFR = 15-29 mL/min/1 73 square meters)    Stage 5 End Stage CKD (GFR <15 mL/min/1 73 square meters)  Note: GFR calculation is accurate only with a steady state creatinine    POCT pregnancy, urine [[id number]]  (Normal) Resulted: 01/15/22 2350    Lab Status: Final result Updated: 01/16/22 0003     EXT PREG TEST UR (Ref: Negative) negative     Control valid    CBC and differential [[id number]] Collected: 01/15/22 2344    Lab Status: Final result Specimen: Blood from Arm, Left Updated: 01/15/22 2359     WBC 6 29 Thousand/uL      RBC 4 77 Million/uL      Hemoglobin 14 5 g/dL      Hematocrit 44 0 %      MCV 92 fL      MCH 30 4 pg      MCHC 33 0 g/dL      RDW 12 7 %      MPV 9 3 fL      Platelets 335 Thousands/uL      nRBC 0 /100 WBCs      Neutrophils Relative 46 %      Immat GRANS % 0 %      Lymphocytes Relative 41 %      Monocytes Relative 10 %      Eosinophils Relative 2 %      Basophils Relative 1 %      Neutrophils Absolute 2 92 Thousands/µL      Immature Grans Absolute 0 01 Thousand/uL      Lymphocytes Absolute 2 56 Thousands/µL      Monocytes Absolute 0 60 Thousand/µL      Eosinophils Absolute 0 13 Thousand/µL      Basophils Absolute 0 07 Thousands/µL     Urine Macroscopic, POC [[id number]]  (Abnormal) Collected: 01/15/22 2349    Lab Status: Final result Specimen: Urine Updated: 01/15/22 2350     Color, UA Yellow     Clarity, UA Clear     pH, UA 7 0     Leukocytes, UA Negative     Nitrite, UA Negative     Protein, UA Negative mg/dl      Glucose, UA Negative mg/dl      Ketones, UA Negative mg/dl      Urobilinogen, UA 0 2 E U /dl      Bilirubin, UA Negative     Blood, UA Trace     Specific [place], UA 1 025    Narrative:      CLINITEK RESULT    Phenobarbital level [[id number]] Collected: 01/15/22 2345    Lab Status: No result Specimen: Blood from Arm, Left                  XR chest 1 view portable   ED Interpretation by Rae Sneed MD (01/16 0021)   Primary reviewed: no acute abnormality            Procedures  Procedures      ED Course  ED Course as of 01/16/22 0303   Sat Nick 15, 2022   2354 Procedure Note: EKG  Date/Time: 01/15/22 11:54 PM   Interpreted by: Yunior Arrieta DO  Indications / Diagnosis: Chest pain  ECG reviewed by me, the ED Provider: yes   The EKG demonstrates:  Rhythm: normal sinus rhythm 90 BPM  Intervals: Normal MI and QT intervals  Axis: Normal axis  QRS/Blocks: Normal QRS  ST Changes: No acute ST/T waves changes  No VARINDER  No TWI  Comparison: Compared to prior EKG performed on 10/24/2021   Sun Jan 16, 2022   0019 hs TnI 0hr: <2   0025 Patient reassessed, she is feeling better  She states her symptoms are improved after migraine cocktail  Reviewed labs and imaging results with patient and her mother  Will plan for discharge at this time  HEART Risk Score      Most Recent Value   Heart Score Risk Calculator    History 0 Filed at: 01/16/2022 0100   ECG 0 Filed at: 01/16/2022 0100   Age 0 Filed at: 01/16/2022 0100   Risk Factors 1 Filed at: 01/16/2022 0100   Troponin 0 Filed at: 01/16/2022 0100   HEART Score 1 Filed at: 01/16/2022 0100                      SBIRT 20yo+      Most Recent Value   SBIRT (23 yo +)    In order to provide better care to our patients, we are screening all of our patients for alcohol and drug use  Would it be okay to ask you these screening questions? No Filed at: 01/15/2022 2246                MDM  Number of Diagnoses or Management Options  Acute headache  Chest pain, unspecified type  Diagnosis management comments:   36 y o  female presenting for multiple symptoms  VSS  No focal neurologic deficit on exam   Will check cardiac w/u, Upreg and treat with migraine cocktail  Symptoms possibly related to migraine headache  Doubt SAH or venous sinus thrombosis  Reassessment: patient feeling better  Ambulatory in ED  Reviewed labs and imaging with patient and her mother using Norwegian interpretor  I have discussed with the patient and her mother our plan to discharge them from the ED and the patient is in agreement with this plan  The patient was provided a written after visit summary with strict RTED precautions  Followup: I have discussed with the patient plan to follow up with their PCP   Contact information provided in AVS        Amount and/or Complexity of Data Reviewed  Clinical lab tests: ordered and reviewed  Tests in the radiology section of CPT®: ordered and reviewed  Obtain history from someone other than the patient: yes  Review and summarize past medical records: yes  Independent visualization of images, tracings, or specimens: yes    Patient Progress  Patient progress: improved      Disposition  Final diagnoses:   Acute headache   Chest pain, unspecified type     Time reflects when diagnosis was documented in both MDM as applicable and the Disposition within this note     Time User Action Codes Description Comment    1/16/2022 12:27 AM Riki Paris Add [R51 9] Acute headache     1/16/2022 12:27 AM Riki Paris Add [R07 9] Chest pain, unspecified type       ED Disposition     ED Disposition Condition Date/Time Comment    Discharge Stable Sun Jan 16, 2022 12:26 AM Carlos [facility] discharge to home/self care  Follow-up Information     Follow up With Specialties Details Why Contact Info    Wilhelmena Prader, MD  Schedule an appointment as soon as possible for a visit  To make appointment for reevaluation in 3-5 days  [address] [facility]  [address] [facility]            Discharge Medication List as of 1/16/2022 12:28 AM      CONTINUE these medications which have NOT CHANGED    Details   folic acid (FOLVITE) 1 mg tablet Take 1 tablet (1 mg total) by mouth daily, Starting Fri 9/10/2021, Normal      lamoTRIgine (LaMICtal) 200 MG tablet Take one tablet with two 25 mg tablets (250 mg total) in the morning and one and a half 200 mg tablets (300 mg total) at night for 2 weeks  Then take one and a half 200 mg tablets (300 mg total) twice per day , Normal      PHENobarbital (LUMINAL) 97 2 MG tablet Take 1 tablet (97 2 mg total) by mouth daily at bedtime, Starting Fri 9/10/2021, Normal      Vitamin D, Cholecalciferol, 50 MCG (2000 UT) CAPS Take 1 capsule by mouth daily, Starting Fri 9/10/2021, Normal           No discharge procedures on file      PDMP Review       Value Time User    PDMP Reviewed  Yes 10/26/2021  9:44 AM Lilibeth Travon [place]           ED Provider  Attending physically available and evaluated [facility]  [facility]OD[facility] managed the patient along with the ED Attending      Electronically Signed by         Darrow Phoenix, DO  01/16/22 9793

## 2022-01-16 NOTE — ED NOTES
Pt ambulatory to [facility] accompanied by mother  Pt does appear to have vision at this time        Erika Aggarwal RN  01/15/22 7767

## 2022-01-17 LAB
ATRIAL RATE: 90 BPM
P AXIS: 43 DEGREES
PR INTERVAL: 124 MS
QRS AXIS: 74 DEGREES
QRSD INTERVAL: 76 MS
QT INTERVAL: 326 MS
QTC INTERVAL: 398 MS
T WAVE AXIS: 16 DEGREES
VENTRICULAR RATE: 90 BPM

## 2022-01-17 PROCEDURE — 93010 ELECTROCARDIOGRAM REPORT: CPT | Performed by: INTERNAL MEDICINE

## 2022-01-27 ENCOUNTER — TELEPHONE (OUTPATIENT)
Dept: NEUROLOGY | Facility: CLINIC | Age: 32
End: 2022-01-27

## 2022-01-28 ENCOUNTER — OFFICE VISIT (OUTPATIENT)
Dept: NEUROLOGY | Facility: CLINIC | Age: 32
End: 2022-01-28
Payer: MEDICARE

## 2022-01-28 VITALS
SYSTOLIC BLOOD PRESSURE: 122 MMHG | BODY MASS INDEX: 37.73 KG/M2 | DIASTOLIC BLOOD PRESSURE: 77 MMHG | HEIGHT: 62 IN | WEIGHT: 205 LBS | HEART RATE: 92 BPM

## 2022-01-28 DIAGNOSIS — G40.119 INTRACTABLE FOCAL EPILEPSY (HCC): Primary | ICD-10-CM

## 2022-01-28 DIAGNOSIS — G40.919 INTRACTABLE EPILEPSY WITHOUT STATUS EPILEPTICUS, UNSPECIFIED EPILEPSY TYPE (HCC): ICD-10-CM

## 2022-01-28 DIAGNOSIS — F79 INTELLECTUAL DISABILITY: ICD-10-CM

## 2022-01-28 DIAGNOSIS — E55.9 VITAMIN D DEFICIENCY: ICD-10-CM

## 2022-01-28 PROCEDURE — 99215 OFFICE O/P EST HI 40 MIN: CPT | Performed by: PSYCHIATRY & NEUROLOGY

## 2022-01-28 PROCEDURE — 99417 PROLNG OP E/M EACH 15 MIN: CPT | Performed by: PSYCHIATRY & NEUROLOGY

## 2022-01-28 RX ORDER — ZONISAMIDE 100 MG/1
100 CAPSULE ORAL
Qty: 30 CAPSULE | Refills: 5 | Status: SHIPPED | OUTPATIENT
Start: 2022-01-28 | End: 2022-05-11

## 2022-01-28 RX ORDER — LAMOTRIGINE 200 MG/1
200 TABLET ORAL 2 TIMES DAILY
Qty: 90 TABLET | Refills: 5 | Status: SHIPPED | OUTPATIENT
Start: 2022-01-28 | End: 2022-05-11 | Stop reason: SDUPTHER

## 2022-01-28 RX ORDER — LAMOTRIGINE 25 MG/1
50 TABLET ORAL 2 TIMES DAILY
Qty: 120 TABLET | Refills: 5 | Status: SHIPPED | OUTPATIENT
Start: 2022-01-28 | End: 2022-05-11 | Stop reason: SDUPTHER

## 2022-01-28 RX ORDER — PHENOBARBITAL 64.8 MG/1
64.8 TABLET ORAL
Qty: 30 TABLET | Refills: 5 | Status: SHIPPED | OUTPATIENT
Start: 2022-01-28 | End: 2022-05-11

## 2022-01-28 RX ORDER — FOLIC ACID 1 MG/1
1 TABLET ORAL DAILY
Qty: 30 TABLET | Refills: 11 | Status: SHIPPED | OUTPATIENT
Start: 2022-01-28 | End: 2022-05-11 | Stop reason: SDUPTHER

## 2022-01-28 RX ORDER — LAMOTRIGINE 25 MG/1
50 TABLET ORAL
COMMUNITY
Start: 2022-01-09 | End: 2022-01-28 | Stop reason: SDUPTHER

## 2022-01-28 NOTE — PROGRESS NOTES
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  Her EEG studies suggest predominantly left hemispheric/temporal seizure onset but there is a high probability of right temporal early involvement  MRI brain study is normal; however, PET/CT brain study also indicates left more the right decreased metabolism  Due to Romanian as her primary language, we are not able to get neuropsychological testing  Her intellectual disability makes it difficult for me to fully determine if she appreciates the risk and benefit of epilepsy surgery  However, she is also not a straight forward candidate for resective epilepsy surgery  She is still a candidate for VNS, DBS, and RNS devices  She is generally amnestic to her seizures and has a difficult time monitoring the frequency of her seizures  There have been no convulsive type of seizures  Her seizures are significantly impairing and frequent enough that she is not able to hold on to a job  The majority of this visit, spent discussing her intractable epilepsy, medication side effects, and alternative options for epilepsy management  I recommended that we start with the VNS therapy option  I reviewed the side effects of hoarseness, vocal impairment, pain, coughing, and general risk of surgery  I specifically focused that VNS therapy is not a curative surgery but has benefit in seizure reduction and severity in at least 50% of patients with about 50-70% seizure frequency reduction over the course of 2-5 years  VNS devices precludes MRI imaging around the device  Alternative option includes DBS for epilepsy surgery but involves direct intracranial surgery      I also recommended adding zonisamide and slow weaning off of phenobarbital given that she continues to have seizures  We will maximize the dose of lamotrigine, weaning of phenobarbital may also reduce clearance of lamotrigine (higher levels)      Plan:   1 - Take Lamotrigine one 200mg tab AND two 25mg tabs twice a day  2 - finish off phenobarbital 97 2mg tab take one tab at bedtime until no more 97 2mg tabs then change to one 64 8mg tab at bedtime  3 - start Zonisamide 100mg cap take one cap at bedtime  4 - in 2 weeks call the office for instructions to increase dose of zonisamide  5 - in 1 month check CMP, zonisamide, phenobarbital, and zonisamide level   6 - please bring your medications to the office visit  7 - please bring your seizure calendar to the office  8 - will place a referral to [address] [place] [address] that makes the VNS therapy; will try to have a representative speak to you about the VNS device  10 - continue with folic acid 1 mg daily    Problem List Items Addressed This Visit        Nervous and Auditory    Intractable focal epilepsy ([facility] [address] ) - Primary    Relevant Medications    lamoTRIgine (LaMICtal) 200 MG tablet    PHENobarbital 64 8 mg tablet    lamoTRIgine (LaMICtal) 25 mg tablet    zonisamide (Zonegran) 100 mg capsule    folic acid (FOLVITE) 1 mg tablet       Other    Intellectual disability    Relevant Medications    PHENobarbital 64 8 mg tablet    Vitamin D deficiency      Other Visit Diagnoses     Intractable epilepsy without status epilepticus, unspecified epilepsy type (HCC)        Relevant Medications    lamoTRIgine (LaMICtal) 200 MG tablet    PHENobarbital 64 8 mg tablet    lamoTRIgine (LaMICtal) 25 mg tablet    zonisamide (Zonegran) 100 mg capsule    folic acid (FOLVITE) 1 mg tablet    Other Relevant Orders    Phenobarbital level    Lamotrigine level    Zonisamide level    Comprehensive metabolic panel          Chief Complaint:   Chief Complaint   Patient presents with    Follow-up    Seizures HPI:    Priscila Veliz is a 36 y o  right handed female here for follow-up evaluation of intractable epilepsy  Interval History 1/28/2022  Telephone  was used for most of the visit  After her EMU admission, she was supposed to be on lamotrigine 300mg twice a day  However, she did not realize that she was supposed to be taking a higher dose  She has been taking lamotrigine 200mg tab one tab twice a day, lamotrigine 25mg tab two at bedtime only, and phenobarbital 97 2 at bedtime  She has tried to keep a seizure calendar, but because she is not aware of her seizures, other family members have to fill it in for her  She generally does not have convulsive seizures, so these seizures are presumed to be focal impaired aware type of seizures or she is acting confused  In November, there was one seizure  In December, there were 2 seizures (mutliple seizures on 12/19)  On 12/19, she was doing her chores, doing dishes, staring, laughing, lasting 5 minutes, then stopped, then kept on laughing, she could not be redirected this kept going on for at least 2 hours  In January, there were three seizures; however, on 1/12/2022 it was reported that she had "all day" seizure; she appeared normal but did not make sense, she seems to be confused  There was no convulsion  On 1/13/2022, she started to develop blurry vision, confused, headache, chills, dizziness; she felt that the television was coming closer then moving away  She was given IV fluids, metoclopramide, ketorolac, and meclizine  She then got better after a while in the ED  She is unable to say what happened during these days  When she has her seizure she cannot say what happens to her, she has no recollection of when she has a seizure  AED/side effects/compliance:  Phenobarbital 97 2mg at bedtime  Lamotrigine 200-250    Event/Seizure semiology:  1   She starts to make a blowing sound, rigid, behavioral arrest, unable to speak, hand tremors, drops things from hands, then she cannot remember what happened (amnestic to seizure)  2  One lifetime convulsion in 2012? 3  FIAS - videos from mother 9/10/2021 - she is quiet, random head movements, does not answer questions, but no consistent behaviors other than being quiet  Woman of childbearing age with Epilepsy:  Contraception: not sexually active  Folic acid supplement:  Yes    Prior Epilepsy History:  Intake History 10/22/2019  She was previously seen by Dr Dillan Kaplan at [facility]  From Dr Denis Gómez office note:  Seizure type 1 - behavioral arrest, tremoulous, unresponsive, amnestic to seizure, ?masticatory movements, blowing air type of sounds  Seizure type 2 - generalized tonic clonic seizure out of sleep  Patient is from the [facility]  She was diagnosed with seizures at an early age in childhood; initially there were staring episodes; mother was unable to report name of medications that were tried  She had generalized convulsions starting at the age of 25  She was initially on Tegretol  Her last visit with Dr Dillan Kaplan was on 6/27/2019  She continued to have "a few attacks"  After a seizure she will have a headache  Patient's history: obtained using  by telephone  Ev Marcos is here with her sister  I started to have Perlita give her own history regarding seizures but she was struggling with the telephone  (not sure how to respond to questions)  She looked to her sister for information  Her sister reports that Ev Marcos started to have seizures about 11 years ago (age 15-16, not during early childhood)  Ev Marcos has no recollection of when she has a seizure  She did not have seizures as a baby  When she was a child she was "slow" ( used "hidden condition")  When she was born, there was a problem during birth, the doctor was pushing or pulling during the delivery and there was something in the brain that got "disconnected"      Her sister reports that Perlita seizures involve Perlita appearing paralyzed, unable to speak, with hand tremors, rigid and stiff, and forgets what she is doing  If she has something in her hands she just drops it  This seizure can be about a minute and may repeat 3 times within 5 minutes  Cheyanne Moulton has a warning of making a blowing sound with her mouth  Her last seizure was last night  She has seizures about a couple of times a week  There was one seizure that caused her to go into a convulsion (this happened 7 years ago)  She started a new job last week, she could not work because she had a seizure there  She could not hold a job because she had seizures at work  Summary 2020  -800, PB 90 qHS  When Perlita has a seizure she is standing, appearing like she zoned out  The family does not keep track of the seizure frequency  Prior to the last visit with me, she would have about 8 seizures a month  Caroline thinks that Perlita may have been on carbamazepine longer than phenobarbital   We added lamotrigine and started to reduce her dose of carbamazepine  Caroline, her sister, accompanies Cheyanne oMulton to her office visits (as assist with [place])  She continued to have seizures  These seizures are still consistent with the Type 1, behavioral arrest and unable to speak  Summary 2021  CBZ--400, PB 90, -150  She comes with her sister Barbara Mcadams)  Cheyanne Moulton does not know when she is having a seizure  She lives with her mother and a sister Michaelnaman [place])  Seizures consist of altered awareness and amnesia, she is confused  It is difficult to determine the frequency of her seizures (she is not always watched)  She has difficulty remembering things (she gets confused)  We weaned off of carbamazepine and increased lamotrigine  There continues to be at least 3 seizures per month    Her mother shows me videos of Perlita being quiet, but not having similar repetitive movements, she is quiet looking around, her mother will call her name and ask her what is she doing or where is she but Mahsa Medina is confused and does not answer  She has these episodes at work and her boss would notice that she is inattentive/confused and dismissed her from work  She completed EMU study in 2021, found to have bilateral temporal involvement, left epileptiform discharges more frequent than right  However, a few of the clinical seizures were obscured by blanket, happening in the bathroom, or when EEG electrodes were being fixed  Special Features  Status epilepticus: No  Self Injury Seizures: No  Precipitating Factors: None  Post-ictal state: amnestic, confusion    Epilepsy Risk Factors:  Abnormal pregnancy: No  Abnormal birth/: No  Abnormal Development: No  Febrile seizures, simple: No  Febrile seizures, complex: No  CNS infection: No  Mental retardation: No  Cerebral palsy: No  Head injury (moderate/severe): No  CNS neoplasm: No  CNS malformation: No  Neurosurgical procedure: No  Stroke: No  Alcohol abuse: No  Drug abuse: No  Family history Sz/epilepsy: No    Prior AEDs:  medication Max dose Time used Reason to stop   Levetiracetam   ?behavioral problem   phenobarbital      carbamazepine      oxcarbazepine      lamotrigine              Prior workup:  x  [id number]  MRI brain - [facility]  Normal MR of brain     2019  MRI brain w/wo  Normal MRI brain study -  No pathology was identified in the left frontal or temporal regions  2021 - PET/CT study  Left temporal hypometabolism; also reduced on the right temporal region    EEGs:  [facility]   Multifocal independent spike-slow waves with phase reversal over the left temporal region T3, T5, Fp1, Fp2, F3 along with generalized spike-slow waves at 1 Hz runs    There are small sharp waves in the bilateral frontal region    -2016 - [facility]  48 hours ambulatory EEG  Generalized spike and waves and polyspike and waves  There is an electrographic seizure but no symptoms were reported  7/11/2016 - 11:17 - generalized bifrontal spike-wave complexes with generalized 7-8 Hz activity    3/5/2020  Poorly organized theta/delta slowing and sharply contoured waveforms on the left temporal area  There seems to bifrontal triphasic waves; but on my interpretation of the EEG there are left frontotemporal spike-slow waves  9/9-9/11/2020 - 48 hours ambulatory EEG  Frequent left temporal delta/theta activity with left midtemporal sharp waves  Intermittent delta activity, sharp waves over the left frontal anterior temporal region      10/20/2021 - EMU admission  Summary interictal findings:   - left temporal/frontotemporal sharp waves (more frequent during sleep)  - left temporal focal slowing  - right temporal delta activity and suspicious very low voltage spikes (starting on day 4 of admission)  - occasional bitemporal independent irregular delta activity  - slow PDR     Summary event findings:   - She had one seizure captured fully - FIAS left temporal onset, but not well visualized  - She had one seizure the bathroom (suspect that she was confused and taken off some EEG electrodes)- similar left temporal onset, irregular delta activity, but not progressing to rhythmic discharges  - She had one FIAS while EEG leads were being fixed (cannot lateralize)  - She had one seizure during sleep (covered by blanket)- initial rhythmic spikes were over the right temporal region, stopped followed by recurrent rhythmic beta activity over the right hemisphere  - One seizure from sleep with suspected clonic activity (covered by blanket) - (bilateral involvement) there is rhythmic sharp alpha activity over T4, extremely low voltage gamma-beta activity over T3, then there is rhythmic delta activity over the left temporal region    Seizure Classification: focal impaired awareness seizure  Epilepsy Classification: intractable focal epilepsy    She is not going to be able to complete a neuropsychology evaluation due to Faroese as her only language      Labs:  Component      Latest Ref Rng & Units 6/14/2021 9/7/2021   WBC      4 31 - 10 16 Thousand/uL  6 19   Red Blood Cell Count      3 81 - 5 12 Million/uL  4 75   Hemoglobin      11 5 - 15 4 g/dL  14 4   HCT      34 8 - 46 1 %  44 6   Platelet Count      367 - 390 Thousands/uL  331   Sodium      136 - 145 mmol/L 137 139   Potassium      3 5 - 5 3 mmol/L 4 4 4 2   Chloride      100 - 108 mmol/L 106 103   CO2      21 - 32 mmol/L 28 30   Anion Gap      4 - 13 mmol/L 3 (L) 6   BUN      5 - 25 mg/dL 8 5   Creatinine      0 60 - 1 30 mg/dL 0 61 0 71   Glucose, Random      65 - 140 mg/dL 88 93   Calcium      8 3 - 10 1 mg/dL 9 3 8 9   AST      5 - 45 U/L 15 17   ALT      12 - 78 U/L 26 27   Alkaline Phosphatase      46 - 116 U/L 148 (H) 147 (H)   Total Protein      6 4 - 8 2 g/dL 7 6 7 8   Albumin      3 5 - 5 0 g/dL 3 7 3 9   TOTAL BILIRUBIN      0 20 - 1 00 mg/dL 0 22 0 17 (L)   eGFR      ml/min/1 73sq m 124 116   Vit D, 25-Hydroxy      30 0 - 100 0 ng/mL 5 7 (L) 61 4   LAMOTRIGINE LEVEL      2 0 - 20 0 ug/mL 8 0    PHENOBARBITAL LEVEL      15 0 - 40 0 ug/mL 15 7 18 2       General exam   /77 (BP Location: Right arm, Patient Position: Sitting, Cuff Size: Large)   Pulse 92   Ht 5' 2" (1 575 m)   Wt 93 kg (205 lb)   LMP 01/09/2022   BMI 37 49 kg/m²    Appearance: normally developed, appears well  Carotids: not assessed  Cardiovascular: regular rate and rhythm and no murmur is present  Pulmonary: clear to auscultation  Abdominal: obese, nontender    HEENT: anicteric   Fundoscopy: not assessed    Mental status  Orientation: alert and oriented to name, place, time  Fund of Knowledge: limited   Attention and Concentration: intact  Current and Remote Memory:intact  Language: spontaneous speech is normal and comprehension is intact    Cranial Nerves  CN 1: not tested  CN 2: pupils equal round reactive to direct and consenual light   CN 3, 4, 6: EOMI, no nystagmus  CN 5:not assessed  CN 7:facial mask is in place during the visit  CN 8:not assessed  CN 9, 10:no dysarthria present  CN 11:symmetric SCM with head turns  CN 12:not assessed    Motor:  Bulk, Tone: normal bulk, normal tone  Pronation: no pronator drift  Strength: Symmetric strength of the arms and legs, no lateralizing weakness     Sensory:  Lighttouch: intact in all limbs  Romberg:not assessed    Coordination:  FNF:FNF bilaterally intact  PAULINO:intact  FFM:intact  Gait/Station:normal gait and normal tandem gait    Reflexes:  Not assessed    Past Medical/Surgical History:  Patient Active Problem List   Diagnosis    Body mass index (BMI) of 37 0 to 37 9 in adult    Intellectual disability    Intractable focal epilepsy ([facility] [address] )    Anticonvulsant drug-induced osteomalacia    Vitamin D deficiency     History reviewed  No pertinent surgical history      Past Psychiatric History:  Depression: Yes  Anxiety: No  Psychosis: No    Medications:    Current Outpatient Medications:     lamoTRIgine (LaMICtal) 200 MG tablet, Take 1 tablet (200 mg total) by mouth 2 (two) times a day With two 25mg tabs, Disp: 90 tablet, Rfl: 5    lamoTRIgine (LaMICtal) 25 mg tablet, Take 2 tablets (50 mg total) by mouth 2 (two) times a day With one 200mg tab, Disp: 120 tablet, Rfl: 5    PHENobarbital 64 8 mg tablet, Take 1 tablet (64 8 mg total) by mouth daily at bedtime, Disp: 30 tablet, Rfl: 5    folic acid (FOLVITE) 1 mg tablet, Take 1 tablet (1 mg total) by mouth daily, Disp: 30 tablet, Rfl: 11    Vitamin D, Cholecalciferol, 50 MCG (2000 UT) CAPS, Take 1 capsule by mouth daily, Disp: 30 capsule, Rfl: 11    zonisamide (Zonegran) 100 mg capsule, Take 1 capsule (100 mg total) by mouth daily at bedtime, Disp: 30 capsule, Rfl: 5    Allergies:  No Known Allergies    Family history:  Family History   Problem Relation Age of Onset    Hypertension Maternal Grandmother     Colon polyps Mother     Glaucoma Mother     Asthma Sister      There is no family history of seizure, epilepsy or developmental delay  Social History  Living situation:  Lives with family  Work:  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, 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  Driving:  No   reports that she has never smoked  She has never used smokeless tobacco  She reports that she does not drink alcohol and does not use drugs  Review of Systems  A review of at least 12 organ/systems was obtained by the medical assistant and reviewed by me, including additional positives/negatives:  Neurological: Positive for seizures (1/15/22)  Decision making was of high-complexity due to the patient's high risk condition (seizures), psychiatric and neuropsychological comorbidities, behavioral problems, memory and cognitive problems and medication side effects  The total amount of time spent with the patient along with pre-chart and post-chart preparation was 65 minutes on the calendar day of the date of service  This included history taking, physical exam, review of ancillary testing, counseling provided to the patient regarding diagnosis, medications, treatment, and risk management, and other communication to the patient's providers and/or family    Start time: 10:35AM  End time: 11:40AM

## 2022-01-28 NOTE — PROGRESS NOTES
Review of Systems    {LimROS-complete:96213}      Review of Systems   Constitutional: Negative  Negative for appetite change and fever  HENT: Negative  Negative for hearing loss, tinnitus, trouble swallowing and voice change  Eyes: Negative for photophobia and pain  Respiratory: Negative  Negative for shortness of breath  Cardiovascular: Negative  Negative for palpitations  Gastrointestinal: Negative  Negative for nausea and vomiting  Endocrine: Negative  Negative for cold intolerance  Genitourinary: Negative  Negative for dysuria, frequency and urgency  Musculoskeletal: Negative  Negative for myalgias and neck pain  Skin: Negative  Negative for rash  Neurological: Positive for seizures (1/15/22)  Negative for dizziness, tremors, syncope, facial asymmetry, speech difficulty, weakness, light-headedness, numbness and headaches  Hematological: Negative  Does not bruise/bleed easily  Psychiatric/Behavioral: Negative  Negative for confusion, hallucinations and sleep disturbance

## 2022-01-28 NOTE — PATIENT INSTRUCTIONS
Plan:   1 - Take Lamotrigine one 200mg tab AND two 25mg tabs twice a day  2 - finish off phenobarbital 97 2mg tab take one tab at bedtime until no more 97 2mg tabs then change to one 64 8mg tab at bedtime  3 - start Zonisamide 100mg cap take one cap at bedtime  I reviewed side effects of Zonisamide, which include, but not limited to, drug rash, [place] Hun syndrome, kidney stones, metabolic acidosis, liver toxicity, renal insufficiency, mood swings, irritability, suicidal ideation, medication interactions, ataxia, incoordination, cognitive impairment, weight loss and lack of appetite     4 - in 2 weeks call the office for instructions to increase dose of zonisamide  5 - in 1 month check CMP, zonisamide, phenobarbital, and zonisamide level   6 - please bring your medications to the office visit  7 - please bring your seizure calendar to the office  8 - will place a referral to [address] [place] [address] that makes the VNS therapy; will try to have a representative speak to you about the VNS device  10 - continue with folic acid 1 mg daily

## 2022-01-31 NOTE — PROGRESS NOTES
Neurology      [address] [place] [address] Silva Arcos is a 31 y o  female  [phone]      Assessment/Recommendations:     {Assess/PlanSmartLinks:43939}       Chief Complaint:  Seizures    Patient states patients is feeling the same since last visit  Subjective:  HPI    Patient Active Problem List   Diagnosis    Body mass index (BMI) of 33 0 to 33 9 in adult    Intellectual disability    Intractable epilepsy without status epilepticus ([facility] )    Anticonvulsant drug-induced osteomalacia    Vitamin D deficiency     Past Medical History:   Diagnosis Date    Seizures ([facility] )      History reviewed  No pertinent surgical history  Current Outpatient Medications on File Prior to Visit   Medication Sig Dispense Refill    carBAMazepine (TEGretol XR) 200 mg 12 hr tablet Take one tab three times a day for 2 weeks, then decrease to one tab twice a day  60 tablet 5    folic acid (FOLVITE) 1 mg tablet Take 1 tablet (1 mg total) by mouth daily 30 tablet 11    lamoTRIgine (LaMICtal) 200 MG tablet Take 1 tablet (200 mg total) by mouth 2 (two) times a day 60 tablet 5    PHENobarbital (LUMINAL) 97 2 MG tablet Take 1 tablet (97 2 mg total) by mouth daily at bedtime 30 tablet 5    ergocalciferol (VITAMIN D2) 50,000 units Take 1 capsule (50,000 Units total) by mouth once a week (Patient not taking: Reported on 6/24/2021) 8 capsule 0     No current facility-administered medications on file prior to visit  No Known Allergies        ROS:  Review of Systems   Constitutional: Negative  Negative for appetite change and fever  HENT: Negative  Negative for hearing loss, tinnitus, trouble swallowing and voice change  Eyes: Negative  Negative for photophobia and pain  Respiratory: Negative  Negative for shortness of breath  Cardiovascular: Negative  Negative for palpitations  Gastrointestinal: Negative  Negative for nausea and vomiting  Endocrine: Negative  Negative for cold intolerance  Genitourinary: Negative  Recommended OBSERVATION and continued MONITORING for progression. Negative for dysuria, frequency and urgency  Musculoskeletal: Positive for myalgias and neck pain  Skin: Negative  Negative for rash  Neurological: Positive for weakness and headaches  Negative for dizziness, tremors, seizures, syncope, facial asymmetry, speech difficulty, light-headedness and numbness  Patient states patient has had headaches everyday  Hematological: Negative  Does not bruise/bleed easily  Psychiatric/Behavioral: Positive for confusion  Negative for hallucinations and sleep disturbance  Objective:  Ht 5' 2" (1 575 m)   Wt 98 1 kg (216 lb 3 2 oz)   BMI 39 54 kg/m²     Physical Exam    Neurological Exam      {Imaging Review Statement:[id number]}        The following portions of the patient's history were reviewed and updated as appropriate: allergies, current medications, family history, past medical history, social history and active problem list   Review of systems was reviewed and otherwise unremarkable from a neurological perspective  I have spent *** minutes with the patient ***and family/ caregiver today in which greater than 50% of this time was spent in counseling and/or coordination of care regarding diagnoses, test results, impression, plan and potential medication side effects

## 2022-02-01 ENCOUNTER — TELEPHONE (OUTPATIENT)
Dept: NEUROLOGY | Facility: CLINIC | Age: 32
End: 2022-02-01

## 2022-02-01 NOTE — TELEPHONE ENCOUNTER
----- Message from Deo Coleman MD sent at 1/28/2022 11:47 AM EST -----  Regarding: VNS therapy  Can you assist with getting this patient connected with the V[facility] representative to educate her and her family about VNS therapy? She speaks Chadian only so if there is a [place] speaking representative it would be helpful      Jacklyn Dyson

## 2022-02-03 NOTE — TELEPHONE ENCOUNTER
VNS HIPAA form forwarded to Dr Ureña for signature and rep can coordinate to have bilingual /rep reach out

## 2022-02-25 NOTE — TELEPHONE ENCOUNTER
Left message for sister Dennie Long advising VNS rep may be attempting to reach them  Requested a call back to confirm

## 2022-04-27 ENCOUNTER — TELEPHONE (OUTPATIENT)
Dept: NEUROLOGY | Facility: CLINIC | Age: 32
End: 2022-04-27

## 2022-04-29 NOTE — TELEPHONE ENCOUNTER
LVM informing pt sister of Dr Noble Sanders scheduling error on May 13th, we are moving that day to May 11th, offered pt 9am in [facility]   Slot on hold per Francesco Kruse please place patient if she agrees
Mailed out letter informing pt of details below  View letter in 'letter' tab 
Notified Dariel Hamman with [facility]  [email or web address]) of appt change - cancellation of 5/13/2022 appt and potential reschedule for 5/11 
Notified Kelley Kennedy rep of appt change 
Received call from sister Eamon Guerrero - they are agreeable to appt   Cancelled and rescheduled per task for 5/11 at 9 am 
Thank you Noemi Glynn! Kesha Moreno, patient is rescheduled!
3

## 2022-05-06 ENCOUNTER — APPOINTMENT (OUTPATIENT)
Dept: LAB | Facility: HOSPITAL | Age: 32
End: 2022-05-06
Attending: PSYCHIATRY & NEUROLOGY
Payer: MEDICARE

## 2022-05-06 DIAGNOSIS — G40.919 INTRACTABLE EPILEPSY WITHOUT STATUS EPILEPTICUS, UNSPECIFIED EPILEPSY TYPE (HCC): ICD-10-CM

## 2022-05-06 LAB
ALBUMIN SERPL BCP-MCNC: 4.2 G/DL (ref 3–5.2)
ALP SERPL-CCNC: 103 U/L (ref 43–122)
ALT SERPL W P-5'-P-CCNC: 18 U/L
ANION GAP SERPL CALCULATED.3IONS-SCNC: 7 MMOL/L (ref 5–14)
AST SERPL W P-5'-P-CCNC: 22 U/L (ref 14–36)
BILIRUB SERPL-MCNC: 0.37 MG/DL
BUN SERPL-MCNC: 5 MG/DL (ref 5–25)
CALCIUM SERPL-MCNC: 8.9 MG/DL (ref 8.4–10.2)
CHLORIDE SERPL-SCNC: 106 MMOL/L (ref 97–108)
CO2 SERPL-SCNC: 29 MMOL/L (ref 22–30)
CREAT SERPL-MCNC: 0.69 MG/DL (ref 0.6–1.2)
GFR SERPL CREATININE-BSD FRML MDRD: 118 ML/MIN/1.73SQ M
GLUCOSE SERPL-MCNC: 105 MG/DL (ref 70–99)
PHENOBARB SERPL-MCNC: 14.5 UG/ML (ref 15–40)
POTASSIUM SERPL-SCNC: 3.7 MMOL/L (ref 3.6–5)
PROT SERPL-MCNC: 7.7 G/DL (ref 5.9–8.4)
SODIUM SERPL-SCNC: 142 MMOL/L (ref 137–147)

## 2022-05-06 PROCEDURE — 80175 DRUG SCREEN QUAN LAMOTRIGINE: CPT

## 2022-05-06 PROCEDURE — 80053 COMPREHEN METABOLIC PANEL: CPT

## 2022-05-06 PROCEDURE — 80203 DRUG SCREEN QUANT ZONISAMIDE: CPT

## 2022-05-06 PROCEDURE — 36415 COLL VENOUS BLD VENIPUNCTURE: CPT

## 2022-05-06 PROCEDURE — 80184 ASSAY OF PHENOBARBITAL: CPT

## 2022-05-09 LAB
LAMOTRIGINE SERPL-MCNC: 9.4 UG/ML (ref 2–20)
ZONISAMIDE SERPL-MCNC: 3.3 UG/ML (ref 10–40)

## 2022-05-11 ENCOUNTER — OFFICE VISIT (OUTPATIENT)
Dept: NEUROLOGY | Facility: CLINIC | Age: 32
End: 2022-05-11
Payer: MEDICARE

## 2022-05-11 VITALS
BODY MASS INDEX: 37.73 KG/M2 | WEIGHT: 205 LBS | SYSTOLIC BLOOD PRESSURE: 137 MMHG | DIASTOLIC BLOOD PRESSURE: 85 MMHG | HEART RATE: 95 BPM | HEIGHT: 62 IN

## 2022-05-11 DIAGNOSIS — G40.919 INTRACTABLE EPILEPSY WITHOUT STATUS EPILEPTICUS, UNSPECIFIED EPILEPSY TYPE (HCC): Primary | ICD-10-CM

## 2022-05-11 PROCEDURE — 99215 OFFICE O/P EST HI 40 MIN: CPT | Performed by: PSYCHIATRY & NEUROLOGY

## 2022-05-11 RX ORDER — MULTIVIT-MIN/IRON/FOLIC ACID/K 18-600-40
1 CAPSULE ORAL DAILY
Qty: 30 CAPSULE | Refills: 11 | Status: SHIPPED | OUTPATIENT
Start: 2022-05-11

## 2022-05-11 RX ORDER — FOLIC ACID 1 MG/1
1 TABLET ORAL DAILY
Qty: 30 TABLET | Refills: 11 | Status: SHIPPED | OUTPATIENT
Start: 2022-05-11

## 2022-05-11 RX ORDER — LAMOTRIGINE 200 MG/1
200 TABLET ORAL 2 TIMES DAILY
Qty: 90 TABLET | Refills: 5 | Status: SHIPPED | OUTPATIENT
Start: 2022-05-11

## 2022-05-11 RX ORDER — PHENOBARBITAL 32.4 MG/1
32.4 TABLET ORAL
Qty: 30 TABLET | Refills: 5 | Status: SHIPPED | OUTPATIENT
Start: 2022-05-11

## 2022-05-11 RX ORDER — LAMOTRIGINE 25 MG/1
50 TABLET ORAL 2 TIMES DAILY
Qty: 120 TABLET | Refills: 5 | Status: SHIPPED | OUTPATIENT
Start: 2022-05-11

## 2022-05-11 RX ORDER — ZONISAMIDE 100 MG/1
CAPSULE ORAL
Qty: 90 CAPSULE | Refills: 5 | Status: SHIPPED | OUTPATIENT
Start: 2022-05-11

## 2022-05-11 NOTE — PROGRESS NOTES
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  Her EEG studies suggest predominantly left hemispheric/temporal seizure onset but there is a high probability of right temporal early involvement  MRI brain study is normal; however, PET/CT brain study also indicates left more the right decreased metabolism  Due to Faroese as her primary language, we are not able to get neuropsychological testing  Due to the presence of bilateral, likely mesial temporal seizure onset, she is not a candidate for direct resective surgery  She is a candidate for neuromodulatory devices such as VNS, DBS for epilepsy and possibly RNS  Her mother is not in favor of any surgical intervention  However, we will recommend VNS therapy as an initial option for the patient as it is less invasive  She does not qualify for DBS therapy given that she does not meet the threshold of having 6 or more seizures a month  RNS is an option to better monitor frequency of seizures, but this would require inpatient intracranial monitoring  I am not sure if Perlita appreciates the risk and benefit of this procedure  She is generally amnestic to her seizures and has a difficult time monitoring the frequency of her seizures  We will continue to advance the dose of zonisamide and wean her off of phenobarbital as phenobarbital does not seem to be sufficiently suppressing her seizures  After this visit, she had an educational session about VNS therapy from the local representatives from [place]  Will reassess her and her mother's willingness for this procedure at the next visit      Plan:   1 - Take Lamotrigine one 200mg tab AND two 25mg tabs twice a day  2 - finish off phenobarbital 64 8mg tab take one tab at bedtime until no more 64 8mg tabs then change to one 32 4mg tab at bedtime  3 - increase Zonisamide 100mg cap take two caps at bedtime for 2 weeks then increase to three caps at bedtime  4 - in 3 months check CMP, lamotrigine, and zonisamide level   5 - please bring your medications to the office visit  6 - please bring your seizure calendar to the office  7 - please continue with folic acid 1mg daily  8 - continue with vitamin D 2000 units once a day  9 - follow-up in 4 months    Problem List Items Addressed This Visit    None     Visit Diagnoses     Intractable epilepsy without status epilepticus, unspecified epilepsy type ([facility] [address] )    -  Primary    Relevant Medications    PHENobarbital 32 4 mg tablet    zonisamide (Zonegran) 100 mg capsule    lamoTRIgine (LaMICtal) 200 MG tablet    lamoTRIgine (LaMICtal) 25 mg tablet    folic acid (FOLVITE) 1 mg tablet    Vitamin D, Cholecalciferol, 50 MCG (2000 UT) CAPS    Other Relevant Orders    Zonisamide level    Comprehensive metabolic panel    Lamotrigine level          Chief Complaint:   Chief Complaint   Patient presents with    Seizures      HPI:    Perlita Bloom Sanya Vidal is a 36 y o  right handed female here for follow-up evaluation of intractable epilepsy  Interval History 5/11/2022   [id number]; her sister is also here to help with instructions  She brought in a seizure calendar (she did not enter when she had her seizure, only as reported by another sister)  There is about one seizure a month (except in April when she had two seizure days and three seizures in one day)  Dates of seizures: 2/24, 3/26, 4/16, 4/30 (3 seizures), none in May so far  Seizures are the same, she cannot remember what is happening to her  She continues to have no aura or warning  There is no loss of consciousness or convulsive seizures  She feels okay with current medications    She does not recognize these medications by name but her sister help point out the medications from her [facility]  AED/side effects/compliance:  Phenobarbital 64 8mg at bedtime  Lamotrigine 250-250  Zonisamide 100 qHS    Event/Seizure semiology:  1  She starts to make a blowing sound, rigid, behavioral arrest, unable to speak, hand tremors, drops things from hands, then she cannot remember what happened (amnestic to seizure)  2  One lifetime convulsion in 2012? 3  FIAS - videos from mother 9/10/2021 - she is quiet, random head movements, does not answer questions, but no consistent behaviors other than being quiet  (similar to Seizure type 1)    Woman of childbearing age with Epilepsy:  Contraception: not sexually active  Folic acid supplement:  Yes    Prior Epilepsy History:  Intake History 10/22/2019  She was previously seen by Dr Sugar Lainez at [facility]  From Dr Samantha Lilly office note:  Seizure type 1 - behavioral arrest, tremoulous, unresponsive, amnestic to seizure, ?masticatory movements, blowing air type of sounds  Seizure type 2 - generalized tonic clonic seizure out of sleep  Patient is [facility]  She was diagnosed with seizures at an early age in childhood; initially there were staring episodes; mother was unable to report name of medications that were tried  She had generalized convulsions starting at the age of 25  She was initially on Tegretol  Her last visit with Dr Sugar Lainez was on 6/27/2019  She continued to have "a few attacks"  After a seizure she will have a headache  Patient's history: obtained using  by telephone  Hina Eason is here with her sister  I started to have Perlita give her own history regarding seizures but she was struggling with the telephone  (not sure how to respond to questions)  She looked to her sister for information  Her sister reports that Hina Eason started to have seizures about 11 years ago (age 15-16, not during early childhood)    Hinakira Eason has no recollection of when she has a seizure  She did not have seizures as a baby  When she was a child she was "slow" ( used "hidden condition")  When she was born, there was a problem during birth, the doctor was pushing or pulling during the delivery and there was something in the brain that got "disconnected"  Her sister reports that Perlita seizures involve Perlita appearing paralyzed, unable to speak, with hand tremors, rigid and stiff, and forgets what she is doing  If she has something in her hands she just drops it  This seizure can be about a minute and may repeat 3 times within 5 minutes  Sarika Urrutia has a warning of making a blowing sound with her mouth  Her last seizure was last night  She has seizures about a couple of times a week  There was one seizure that caused her to go into a convulsion (this happened 7 years ago)  She started a new job last week, she could not work because she had a seizure there  She could not hold a job because she had seizures at work  Summary 2020  -800, PB 90 qHS  When Perlita has a seizure she is standing, appearing like she zoned out  The family does not keep track of the seizure frequency  Prior to the last visit with me, she would have about 8 seizures a month  Caroline thinks that Perlita may have been on carbamazepine longer than phenobarbital   We added lamotrigine and started to reduce her dose of carbamazepine  Caroline, her sister, accompanies Sarika Urrutia to her office visits (as assist with [place])  She continued to have seizures  These seizures are still consistent with the Type 1, behavioral arrest and unable to speak  Summary 2021  CBZ--400, PB 90, -150  She comes with her sister Jeri Juan)  Sarika Urrutia does not know when she is having a seizure  She lives with her mother and a sister Lois Bamberger)  Seizures consist of altered awareness and amnesia, she is confused    It is difficult to determine the frequency of her seizures (she is not always watched)  She has difficulty remembering things (she gets confused)  We weaned off of carbamazepine and increased lamotrigine  There continues to be at least 3 seizures per month  Her mother shows me videos of Perlita being quiet, but not having similar repetitive movements, she is quiet looking around, her mother will call her name and ask her what is she doing or where is she but Mario Bae is confused and does not answer  She has these episodes at work and her boss would notice that she is inattentive/confused and dismissed her from work  She completed EMU study in 2021, found to have bilateral temporal involvement, left epileptiform discharges more frequent than right  However, a few of the clinical seizures were obscured by blanket, happening in the bathroom, or when EEG electrodes were being fixed  Interval History 2022  PB 97 2, -250  After her EMU admission, she was supposed to be on lamotrigine 300mg twice a day; but she only has been on -250 and phenobarbital 97 2 at bedtime  She has tried to keep a seizure calendar, but because she is not aware of her seizures, other family members have to fill it in for her  She at least has a seizure 1-3 times a month  No convulsion; she has periods of confusion and altered awareness  When she has her seizure she cannot say what happens to her, she has no recollection of when she has a seizure        Special Features  Status epilepticus: No  Self Injury Seizures: No  Precipitating Factors: None  Post-ictal state: amnestic, confusion    Epilepsy Risk Factors:  Abnormal pregnancy: No  Abnormal birth/: No  Abnormal Development: No  Febrile seizures, simple: No  Febrile seizures, complex: No  CNS infection: No  Mental retardation: No  Cerebral palsy: No  Head injury (moderate/severe): No  CNS neoplasm: No  CNS malformation: No  Neurosurgical procedure: No  Stroke: No  Alcohol abuse: No  Drug abuse: No  Family history Sz/epilepsy: No    Prior AEDs:  medication Reason to stop   Levetiracetam ?behavioral problem   phenobarbital    carbamazepine    oxcarbazepine    lamotrigine    zonisamide      Prior workup:  x  Imagin2016  MRI brain - [facility]  Normal MR of brain     2019  MRI brain w/wo  Normal MRI brain study -  No pathology was identified in the left frontal or temporal regions  2021 - PET/CT study  Left temporal hypometabolism; also reduced on the right temporal region    EEGs:  2015 [facility]   Multifocal independent spike-slow waves with phase reversal over the left temporal region T3, T5, Fp1, Fp2, F3 along with generalized spike-slow waves at 1 Hz runs  There are small sharp waves in the bilateral frontal region    -2016 - [facility]  48 hours ambulatory EEG  Generalized spike and waves and polyspike and waves  There is an electrographic seizure but no symptoms were reported  2016 - 11:17 - generalized bifrontal spike-wave complexes with generalized 7-8 Hz activity    3/5/2020  Poorly organized theta/delta slowing and sharply contoured waveforms on the left temporal area  There seems to bifrontal triphasic waves; but on my interpretation of the EEG there are left frontotemporal spike-slow waves  -2020 - 48 hours ambulatory EEG  Frequent left temporal delta/theta activity with left midtemporal sharp waves  Intermittent delta activity, sharp waves over the left frontal anterior temporal region      10/20/2021 - EMU admission  Summary interictal findings:   - left temporal/frontotemporal sharp waves (more frequent during sleep)  - left temporal focal slowing  - right temporal delta activity and suspicious very low voltage spikes (starting on day 4 of admission)  - occasional bitemporal independent irregular delta activity  - slow PDR     Summary event findings:   - She had one seizure captured fully - FIAS left temporal onset, but not well visualized  - She had one seizure the bathroom (suspect that she was confused and taken off some EEG electrodes)- similar left temporal onset, irregular delta activity, but not progressing to rhythmic discharges  - She had one FIAS while EEG leads were being fixed (cannot lateralize)  - She had one seizure during sleep (covered by blanket)- initial rhythmic spikes were over the right temporal region, stopped followed by recurrent rhythmic beta activity over the right hemisphere  - One seizure from sleep with suspected clonic activity (covered by blanket) - (bilateral involvement) there is rhythmic sharp alpha activity over T4, extremely low voltage gamma-beta activity over T3, then there is rhythmic delta activity over the left temporal region    Seizure Classification: focal impaired awareness seizure  Epilepsy Classification: intractable focal epilepsy    She is not going to be able to complete a neuropsychology evaluation due to [address] as her only language      Labs:  Component      Latest Ref Rng & Units 5/6/2022   Sodium      137 - 147 mmol/L 142   Potassium      3 6 - 5 0 mmol/L 3 7   Chloride      97 - 108 mmol/L 106   CO2      22 - 30 mmol/L 29   Anion Gap      5 - 14 mmol/L 7   BUN      5 - 25 mg/dL 5   Creatinine      0 60 - 1 20 mg/dL 0 69   Glucose, Random      70 - 99 mg/dL 105 (H)   Calcium      8 4 - 10 2 mg/dL 8 9   AST      14 - 36 U/L 22   ALT      <35 U/L 18   Alkaline Phosphatase      43 - 122 U/L 103   Total Protein      5 9 - 8 4 g/dL 7 7   Albumin      3 0 - 5 2 g/dL 4 2   TOTAL BILIRUBIN      <1 30 mg/dL 0 37   eGFR      ml/min/1 73sq m 118   PHENOBARBITAL LEVEL      15 0 - 40 0 ug/mL 14 5 (L)   LAMOTRIGINE LEVEL      2 0 - 20 0 ug/mL 9 4   ZONISAMIDE LEVEL      10 0 - 40 0 ug/mL 3 3 (L)     General exam   /85 (BP Location: Left arm, Patient Position: Sitting, Cuff Size: Standard)   Pulse 95   Ht 5' 2" (1 575 m)   Wt 93 kg (205 lb)   BMI 37 49 kg/m²    Appearance: normally developed, appears well  Carotids: not assessed  Cardiovascular: regular rate and rhythm and no murmur is present  Pulmonary: clear to auscultation  Abdominal: obese, nontender    HEENT: anicteric   Fundoscopy: not assessed    Mental status  Orientation: alert and oriented to name, place, time  Fund of Knowledge: limited   Attention and Concentration: intact  Current and Remote Memory:intact  Language: spontaneous speech is normal and comprehension is intact    Cranial Nerves  CN 1: not tested  CN 2: pupils equal round reactive to direct and consenual light   CN 3, 4, 6: EOMI, no nystagmus  CN 5:not assessed  CN 7:muscles of facial expression are symmetric  CN 8:not assessed  CN 9, 10:no dysarthria present  CN 11:not assessed  CN 12:tongue is midline    Motor:  Bulk, Tone: normal bulk, normal tone  Pronation: normal barrel roll  Strength: Symmetric strength of the arms and legs, no lateralizing weakness     Sensory:  Lighttouch: intact in all limbs  Romberg:not assessed    Coordination:  FNF:FNF bilaterally intact  PAULINO:intact  FFM:intact  Gait/Station:normal gait and normal tandem gait    Reflexes:  Not assessed    Past Medical/Surgical History:  Patient Active Problem List   Diagnosis    Body mass index (BMI) of 37 0 to 37 9 in adult    Intellectual disability    Intractable focal epilepsy [facility]    Anticonvulsant drug-induced osteomalacia    Vitamin D deficiency     History reviewed  No pertinent surgical history  Past Psychiatric History:  Depression: Yes  Anxiety: No  Psychosis: No    Medications:    Current Outpatient Medications:     folic acid (FOLVITE) 1 mg tablet, Take 1 tablet (1 mg total) by mouth in the morning , Disp: 30 tablet, Rfl: 11    lamoTRIgine (LaMICtal) 200 MG tablet, Take 1 tablet (200 mg total) by mouth in the morning and 1 tablet (200 mg total) in the evening  With two 25mg tabs  , Disp: 90 tablet, Rfl: 5    lamoTRIgine (LaMICtal) 25 mg tablet, Take 2 tablets (50 mg total) by mouth in the morning and 2 tablets (50 mg total) in the evening  With one 200mg tab , Disp: 120 tablet, Rfl: 5    PHENobarbital 32 4 mg tablet, Take 1 tablet (32 4 mg total) by mouth daily at bedtime, Disp: 30 tablet, Rfl: 5    Vitamin D, Cholecalciferol, 50 MCG (2000 UT) CAPS, Take 1 capsule by mouth daily, Disp: 30 capsule, Rfl: 11    zonisamide (Zonegran) 100 mg capsule, Take by mouth take 2 caps at bedtime for 2 weeks then 3 caps at bedtime, Disp: 90 capsule, Rfl: 5    Allergies:  No Known Allergies    Family history:  Family History   Problem Relation Age of Onset    Hypertension Maternal Grandmother     Colon polyps Mother     Glaucoma Mother     Asthma Sister      There is no family history of seizure, epilepsy or developmental delay  Social History  Living situation:  Lives with family  Work:  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, 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  Driving:  No   reports that she has never smoked  She has never used smokeless tobacco  She reports that she does not drink alcohol and does not use drugs  Review of Systems  A review of at least 12 organ/systems was obtained by the medical assistant and reviewed by me, including additional positives/negatives:  Neurological: Positive for seizures (6 seizures since last visit, most recent 4/30)  Decision making was of high-complexity due to the patient's high risk condition (seizures), psychiatric and neuropsychological comorbidities, behavioral problems, memory and cognitive problems and medication side effects

## 2022-05-11 NOTE — PATIENT INSTRUCTIONS
Plan:   1 - Take Lamotrigine one 200mg tab AND two 25mg tabs twice a day  2 - finish off phenobarbital 64 8mg tab take one tab at bedtime until no more 64 8mg tabs then change to one 32 4mg tab at bedtime  3 - increase Zonisamide 100mg cap take two caps at bedtime for 2 weeks then increase to three caps at bedtime  4 - in 3 months check CMP, lamotrigine, and zonisamide level   5 - please bring your medications to the office visit  6 - please bring your seizure calendar to the office  7 - please continue with folic acid 1mg daily  8 - continue with vitamin D 2000 units once a day  9 - follow-up in 4 months

## 2022-05-11 NOTE — PROGRESS NOTES
Review of Systems   Constitutional: Negative  Negative for appetite change and fever  HENT: Negative  Negative for hearing loss, tinnitus, trouble swallowing and voice change  Eyes: Negative  Negative for photophobia and pain  Respiratory: Negative  Negative for shortness of breath  Cardiovascular: Negative  Negative for palpitations  Gastrointestinal: Negative  Negative for nausea and vomiting  Endocrine: Negative  Negative for cold intolerance  Genitourinary: Negative  Negative for dysuria, frequency and urgency  Musculoskeletal: Negative  Negative for myalgias and neck pain  Skin: Negative  Negative for rash  Neurological: Positive for seizures (6 seizures since last visit, most recent 4/30)  Negative for dizziness, tremors, syncope, facial asymmetry, speech difficulty, weakness, light-headedness, numbness and headaches  Hematological: Negative  Does not bruise/bleed easily  Psychiatric/Behavioral: Negative  Negative for confusion, hallucinations and sleep disturbance  All other systems reviewed and are negative

## 2022-06-10 ENCOUNTER — TELEPHONE (OUTPATIENT)
Dept: NEUROLOGY | Facility: CLINIC | Age: 32
End: 2022-06-10

## 2022-06-10 NOTE — TELEPHONE ENCOUNTER
Received call from Jesus Carter ( with Neuromodulation), they were not successful in contacting patient's mother for education on VNS  She would like to send a rep (Cuban speaking) to next office visit to help with education  She asked that the mother come to the appt  Added this to appt notes  Will postpone this encounter to 9/5 so that we can call the patient to remind her

## 2022-09-06 NOTE — TELEPHONE ENCOUNTER
Called pt's sister Jacinda Pimentel ([phone]) to remind of appt  on 9/9, and to accompany her sister to act as   Left  and call back number  Will try again tomorrow

## 2022-09-07 ENCOUNTER — APPOINTMENT (OUTPATIENT)
Dept: LAB | Age: 32
End: 2022-09-07
Payer: MEDICARE

## 2022-09-07 DIAGNOSIS — G40.919 INTRACTABLE EPILEPSY WITHOUT STATUS EPILEPTICUS, UNSPECIFIED EPILEPSY TYPE (HCC): ICD-10-CM

## 2022-09-07 LAB
ALBUMIN SERPL BCP-MCNC: 3.5 G/DL (ref 3.5–5)
ALP SERPL-CCNC: 131 U/L (ref 46–116)
ALT SERPL W P-5'-P-CCNC: 35 U/L (ref 12–78)
ANION GAP SERPL CALCULATED.3IONS-SCNC: 5 MMOL/L (ref 4–13)
AST SERPL W P-5'-P-CCNC: 20 U/L (ref 5–45)
BILIRUB SERPL-MCNC: 0.34 MG/DL (ref 0.2–1)
BUN SERPL-MCNC: 6 MG/DL (ref 5–25)
CALCIUM SERPL-MCNC: 8.5 MG/DL (ref 8.3–10.1)
CHLORIDE SERPL-SCNC: 109 MMOL/L (ref 96–108)
CO2 SERPL-SCNC: 25 MMOL/L (ref 21–32)
CREAT SERPL-MCNC: 0.74 MG/DL (ref 0.6–1.3)
GFR SERPL CREATININE-BSD FRML MDRD: 109 ML/MIN/1.73SQ M
GLUCOSE SERPL-MCNC: 92 MG/DL (ref 65–140)
POTASSIUM SERPL-SCNC: 4 MMOL/L (ref 3.5–5.3)
PROT SERPL-MCNC: 7.8 G/DL (ref 6.4–8.4)
SODIUM SERPL-SCNC: 139 MMOL/L (ref 135–147)

## 2022-09-07 PROCEDURE — 80175 DRUG SCREEN QUAN LAMOTRIGINE: CPT

## 2022-09-07 PROCEDURE — 80203 DRUG SCREEN QUANT ZONISAMIDE: CPT

## 2022-09-07 PROCEDURE — 80053 COMPREHEN METABOLIC PANEL: CPT

## 2022-09-07 NOTE — TELEPHONE ENCOUNTER
Spoke to sister Osvaldo Hastings, she is aware of Perlita's appointment and is able to accompany her for 1pm apopintment

## 2022-09-09 ENCOUNTER — OFFICE VISIT (OUTPATIENT)
Dept: NEUROLOGY | Facility: CLINIC | Age: 32
End: 2022-09-09
Payer: MEDICARE

## 2022-09-09 VITALS
HEIGHT: 62 IN | SYSTOLIC BLOOD PRESSURE: 133 MMHG | RESPIRATION RATE: 18 BRPM | DIASTOLIC BLOOD PRESSURE: 92 MMHG | BODY MASS INDEX: 35 KG/M2 | HEART RATE: 100 BPM | WEIGHT: 190.2 LBS | TEMPERATURE: 97.1 F

## 2022-09-09 DIAGNOSIS — F79 INTELLECTUAL DISABILITY: ICD-10-CM

## 2022-09-09 DIAGNOSIS — G40.119 INTRACTABLE FOCAL EPILEPSY (HCC): Primary | ICD-10-CM

## 2022-09-09 LAB
LAMOTRIGINE SERPL-MCNC: 13.5 UG/ML (ref 2–20)
ZONISAMIDE SERPL-MCNC: 9 UG/ML (ref 10–40)

## 2022-09-09 PROCEDURE — 99214 OFFICE O/P EST MOD 30 MIN: CPT | Performed by: PHYSICIAN ASSISTANT

## 2022-09-09 NOTE — PROGRESS NOTES
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  Her EEG studies suggest predominantly left hemispheric/temporal seizure onset but there is a high probability of right temporal early involvement  MRI brain study is normal; however, PET/CT brain study also indicates left more the right decreased metabolism  Due to [place] as her primary language, we are not able to get neuropsychological testing  Due to the presence of bilateral, likely mesial temporal seizure onset, she is not a candidate for direct resective surgery  She is a candidate for neuromodulatory devices such as VNS, DBS for epilepsy and possibly RNS  We have recommend VNS therapy as an initial option for the patient as it is less invasive  She previously had an educational session with the VNS representative, but has yet to decide about proceeding with VNS  I brought this up to her again today and she tells me she does not want any type of surgery  We can continue to discuss and if appropriate, have the patient and her family meet with the VNS rep again  She is generally amnestic to her seizures and has a difficult time monitoring the frequency of her seizures  We again discussed keeping track of seizures on a calendar if possible  AED levels are pending at this time    We will likely continue to advance zonisamide and continue to wean off phenobarbital          Plan:  1 - Take Lamotrigine one 200mg tab AND two 25mg tabs twice a day  2 - continue phenobarbital take one 32 4mg tab at bedtime  3 - continue Zonisamide 100mg cap take three caps at bedtime  4 - please bring your medications to the office visit  5 - please bring your seizure calendar to the office  6 - please continue with folic acid 1mg daily  7 - continue with vitamin D 2000 units once a day  8 - follow-up in 4 months     No problem-specific Assessment & Plan notes found for this encounter  Diagnoses and all orders for this visit:    Intractable focal epilepsy [facility])    Intellectual disability           Subjective:    AKIN Makarena Garnica is a 36 y o  right handed female here for follow-up evaluation of intractable epilepsy  Interval History 9/9/2022   #[id number] used for the visit today, along with her brother in law who accompanies her  She is unable to tell me the frequency of seizures since her last visit  Her brother in law also does not know  No seizure calendar presented  They tried calling patients sister, but she did not answer  They did reach patients mother, but she could only tell me Nilam Worrell is still having seizures, had a tough time quantifying seizures or even telling me if they were more or less frequent than when seen last  although she thinks things are "better"  Patient did bring all of her medication bottles today for me to confirm what she is taking  She reports taking all meds as prescribed, no missed doses  They were supposed to consider VNS, and a [place] speaking representative was apparently going to come to the visit today, but this would not have been helpful any way because the mother is not here  When I asked Perlita about the VNS, she says no implants  AED/side effects/compliance:  Phenobarbital 32 4mg at bedtime  Lamotrigine 250-250  Zonisamide 300 qHS     Event/Seizure semiology:  1  She starts to make a blowing sound, rigid, behavioral arrest, unable to speak, hand tremors, drops things from hands, then she cannot remember what happened (amnestic to seizure)  2  One lifetime convulsion in 2012? 3  FIAS - videos from mother 9/10/2021 - she is quiet, random head movements, does not answer questions, but no consistent behaviors other than being quiet   (similar to Seizure type 1)     Woman of childbearing age with Epilepsy:  Contraception: not sexually active  Folic acid supplement: Yes     Prior Epilepsy History:  Intake History 10/22/2019  She was previously seen by Dr Sowmya Littlejohn at [facility]  From Dr Jose Luis Jarvis office note:  Seizure type 1 - behavioral arrest, tremoulous, unresponsive, amnestic to seizure, ?masticatory movements, blowing air type of sounds  Seizure type 2 - generalized tonic clonic seizure out of sleep  Patient is from [facility]  She was diagnosed with seizures at an early age in childhood; initially there were staring episodes; mother was unable to report name of medications that were tried  She had generalized convulsions starting at the age of 25  She was initially on Tegretol  Her last visit with Dr Sowmya Littlejohn was on 6/27/2019  She continued to have "a few attacks"  After a seizure she will have a headache  Patient's history: obtained using  by telephone  Isael Celeste is here with her sister  I started to have Perlita give her own history regarding seizures but she was struggling with the telephone  (not sure how to respond to questions)  She looked to her sister for information  Her sister reports that Isael Celeste started to have seizures about 11 years ago (age 15-16, not during early childhood)  Isael Ceelste has no recollection of when she has a seizure  She did not have seizures as a baby  When she was a child she was "slow" ( used "hidden condition")  When she was born, there was a problem during birth, the doctor was pushing or pulling during the delivery and there was something in the brain that got "disconnected"  Her sister reports that Perlita seizures involve Perlita appearing paralyzed, unable to speak, with hand tremors, rigid and stiff, and forgets what she is doing  If she has something in her hands she just drops it  This seizure can be about a minute and may repeat 3 times within 5 minutes  Isael Celeste has a warning of making a blowing sound with her mouth   Her last seizure was last night  She has seizures about a couple of times a week  There was one seizure that caused her to go into a convulsion (this happened 7 years ago)  She started a new job last week, she could not work because she had a seizure there  She could not hold a job because she had seizures at work  Summary 2020  -800, PB 90 qHS  When Perlita has a seizure she is standing, appearing like she zoned out  The family does not keep track of the seizure frequency  Prior to the last visit with me, she would have about 8 seizures a month  Caroline thinks that Perlita may have been on carbamazepine longer than phenobarbital  We added lamotrigine and started to reduce her dose of carbamazepine  Caroline, her sister, accompanies Pat Ballesteros to her office visits (as assist with [place])  She continued to have seizures  These seizures are still consistent with the Type 1, behavioral arrest and unable to speak  Summary 2021  CBZ--400, PB 90, -150  She comes with her sister Talib Davila)  Pat Ballesteros does not know when she is having a seizure  She lives with her mother and a sister Yadi Guzman)  Seizures consist of altered awareness and amnesia, she is confused  It is difficult to determine the frequency of her seizures (she is not always watched)  She has difficulty remembering things (she gets confused)  We weaned off of carbamazepine and increased lamotrigine  There continues to be at least 3 seizures per month  Her mother shows me videos of Perlita being quiet, but not having similar repetitive movements, she is quiet looking around, her mother will call her name and ask her what is she doing or where is she but Pat Ballesteros is confused and does not answer  She has these episodes at work and her boss would notice that she is inattentive/confused and dismissed her from work  She completed EMU study in October 2021, found to have bilateral temporal involvement, left epileptiform discharges more frequent than right  However, a few of the clinical seizures were obscured by blanket, happening in the bathroom, or when EEG electrodes were being fixed  Interval History 2022  PB 97 2, -250  After her EMU admission, she was supposed to be on lamotrigine 300mg twice a day; but she only has been on -250 and phenobarbital 97 2 at bedtime  She has tried to keep a seizure calendar, but because she is not aware of her seizures, other family members have to fill it in for her  She at least has a seizure 1-3 times a month  No convulsion; she has periods of confusion and altered awareness  When she has her seizure she cannot say what happens to her, she has no recollection of when she has a seizure  Interval History 2022   [id number]; her sister is also here to help with instructions  She brought in a seizure calendar (she did not enter when she had her seizure, only as reported by another sister)  There is about one seizure a month (except in April when she had two seizure days and three seizures in one day)  Dates of seizures: , 3/26, ,  (3 seizures), none in May so far  Seizures are the same, she cannot remember what is happening to her  She continues to have no aura or warning  There is no loss of consciousness or convulsive seizures  She feels okay with current medications  She does not recognize these medications by name but her sister help point out the medications from her pharmacy [facility]       Special Features  Status epilepticus: No  Self Injury Seizures: No  Precipitating Factors: None  Post-ictal state: amnestic, confusion     Epilepsy Risk Factors:  Abnormal pregnancy: No  Abnormal birth/: No  Abnormal Development: No  Febrile seizures, simple: No  Febrile seizures, complex: No  CNS infection: No  Mental retardation: No  Cerebral palsy: No  Head injury (moderate/severe): No  CNS neoplasm: No  CNS malformation: No  Neurosurgical procedure: No  Stroke: No  Alcohol abuse: No  Drug abuse: No  Family history Sz/epilepsy: No     Prior AEDs:  medication Reason to stop   Levetiracetam ?behavioral problem   phenobarbital     carbamazepine     oxcarbazepine     lamotrigine     zonisamide        Prior workup:  x  [id number]  MRI brain - [facility]  Normal MR of brain      2019  MRI brain w/wo  Normal MRI brain study - No pathology was identified in the left frontal or temporal regions  2021 - PET/CT study  Left temporal hypometabolism; also reduced on the right temporal region     EEGs:  2015 [facility]   Multifocal independent spike-slow waves with phase reversal over the left temporal region T3, T5, Fp1, Fp2, F3 along with generalized spike-slow waves at 1 Hz runs  There are small sharp waves in the bilateral frontal region     -2016 - [facility]  48 hours ambulatory EEG  Generalized spike and waves and polyspike and waves  There is an electrographic seizure but no symptoms were reported  2016 - 11:17 - generalized bifrontal spike-wave complexes with generalized 7-8 Hz activity     3/5/2020  Poorly organized theta/delta slowing and sharply contoured waveforms on the left temporal area  There seems to bifrontal triphasic waves; but on my interpretation of the EEG there are left frontotemporal spike-slow waves  -2020 - 48 hours ambulatory EEG  Frequent left temporal delta/theta activity with left midtemporal sharp waves  Intermittent delta activity, sharp waves over the left frontal anterior temporal region       10/20/2021 - EMU admission  Summary interictal findings:   - left temporal/frontotemporal sharp waves (more frequent during sleep)  - left temporal focal slowing  - right temporal delta activity and suspicious very low voltage spikes (starting on day 4 of admission)  - occasional bitemporal independent irregular delta activity  - slow PDR     Summary event findings:   - She had one seizure captured fully - FIAS left temporal onset, but not well visualized  - She had one seizure the bathroom (suspect that she was confused and taken off some EEG electrodes)- similar left temporal onset, irregular delta activity, but not progressing to rhythmic discharges  - She had one FIAS while EEG leads were being fixed (cannot lateralize)  - She had one seizure during sleep (covered by blanket)- initial rhythmic spikes were over the right temporal region, stopped followed by recurrent rhythmic beta activity over the right hemisphere  - One seizure from sleep with suspected clonic activity (covered by blanket) - (bilateral involvement) there is rhythmic sharp alpha activity over T4, extremely low voltage gamma-beta activity over T3, then there is rhythmic delta activity over the left temporal region    Seizure Classification: focal impaired awareness seizure  Epilepsy Classification: intractable focal epilepsy     She is not going to be able to complete a neuropsychology evaluation due to Welsh as her only language  The following portions of the patient's history were reviewed and updated as appropriate: current medications, past family history, past medical history, past social history, past surgical history and problem list          Objective:    Blood pressure 133/92, pulse 100, temperature (!) 97 1 °F (36 2 °C), temperature source Tympanic, resp  rate 18, height 5' 2" (1 575 m), weight 86 3 kg (190 lb 3 2 oz)  Physical Exam  Constitutional:       Appearance: Normal appearance  HENT:      Head: Normocephalic and atraumatic  Eyes:      Extraocular Movements: EOM normal       Pupils: Pupils are equal, round, and reactive to light  Neurological:      Mental Status: She is alert  Deep Tendon Reflexes: Strength normal and reflexes are normal and symmetric  Psychiatric:         Mood and Affect: Mood normal          Speech: Speech normal          Behavior: Behavior normal          Neurological Exam  Mental Status  Alert   Oriented to person, place, time and situation  Speech is normal  Language is fluent with no aphasia  Attention and concentration are normal     Cranial Nerves  CN II: Visual fields full to confrontation  CN III, IV, VI: Extraocular movements intact bilaterally  Pupils equal round and reactive to light bilaterally  CN V: Facial sensation is normal   CN VII: Full and symmetric facial movement  CN VIII: Hearing is normal   CN IX, X: Palate elevates symmetrically  CN XI: Shoulder shrug strength is normal   CN XII: Tongue midline without atrophy or fasciculations  Motor   Normal muscle tone  Strength is 5/5 throughout all four extremities  Sensory  Light touch is normal in upper and lower extremities  Reflexes  Deep tendon reflexes are 2+ and symmetric in all four extremities  Coordination  Right: Finger-to-nose normal Left: Finger-to-nose normal     Gait  Casual gait is normal including stance, stride, and arm swing  Current Outpatient Medications   Medication Sig Dispense Refill    lamoTRIgine (LaMICtal) 200 MG tablet Take 1 tablet (200 mg total) by mouth in the morning and 1 tablet (200 mg total) in the evening  With two 25mg tabs  90 tablet 5    lamoTRIgine (LaMICtal) 25 mg tablet Take 2 tablets (50 mg total) by mouth in the morning and 2 tablets (50 mg total) in the evening  With one 200mg tab  120 tablet 5    PHENobarbital 32 4 mg tablet Take 1 tablet (32 4 mg total) by mouth daily at bedtime 30 tablet 5    Vitamin D, Cholecalciferol, 50 MCG (2000 UT) CAPS Take 1 capsule by mouth daily 30 capsule 11    zonisamide (Zonegran) 100 mg capsule Take by mouth take 2 caps at bedtime for 2 weeks then 3 caps at bedtime 90 capsule 5    folic acid (FOLVITE) 1 mg tablet Take 1 tablet (1 mg total) by mouth in the morning  (Patient not taking: Reported on 9/9/2022) 30 tablet 11     No current facility-administered medications for this visit        No Known Allergies     Past Medical History:   Diagnosis Date    Seizures [facility])       History reviewed  No pertinent surgical history  Family History   Problem Relation Age of Onset    Hypertension Maternal Grandmother     Colon polyps Mother     Glaucoma Mother     Asthma Sister       Past Psychiatric History:  Depression: Yes  Anxiety: No  Psychosis: No    Social History  Living situation:  Lives with family  Work:  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, 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  Driving:  No   reports that she has never smoked  She has never used smokeless tobacco  She reports that she does not drink alcohol and does not use drugs  ROS:    Review of Systems   Constitutional: Negative for chills, fatigue and fever  HENT: Negative for ear pain and sore throat  Eyes: Negative for pain and visual disturbance  Respiratory: Negative for cough and shortness of breath  Cardiovascular: Negative for chest pain and palpitations  Gastrointestinal: Negative for abdominal pain and vomiting  Genitourinary: Negative for dysuria and hematuria  Musculoskeletal: Negative for arthralgias and back pain  Skin: Negative for color change and rash  Neurological: Positive for seizures (july )  Negative for syncope  All other systems reviewed and are negative      I personally reviewed and updated the ROS as appropriate

## 2022-09-09 NOTE — PATIENT INSTRUCTIONS
Plan:   1 - Take Lamotrigine one 200mg tab AND two 25mg tabs twice a day  2 - continue phenobarbital take one 32 4mg tab at bedtime  3 - continue Zonisamide 100mg cap take three caps at bedtime  4 - please bring your medications to the office visit  5 - please bring your seizure calendar to the office  6 - please continue with folic acid 1mg daily  7 - continue with vitamin D 2000 units once a day  8 - follow-up in 4 months

## 2022-09-20 ENCOUNTER — TELEPHONE (OUTPATIENT)
Dept: NEUROLOGY | Facility: CLINIC | Age: 32
End: 2022-09-20

## 2022-09-20 ENCOUNTER — PATIENT MESSAGE (OUTPATIENT)
Dept: NEUROLOGY | Facility: CLINIC | Age: 32
End: 2022-09-20

## 2022-09-20 DIAGNOSIS — G40.919 INTRACTABLE EPILEPSY WITHOUT STATUS EPILEPTICUS, UNSPECIFIED EPILEPSY TYPE (HCC): ICD-10-CM

## 2022-09-20 NOTE — TELEPHONE ENCOUNTER
----- Message from Richard Machado MD sent at 9/20/2022  8:39 AM EDT -----  Regarding patient's AED levels  Lamotrigine is okay  Zonisamide is under dosed  Please confirm that she is on zonisamide 300mg at bedtime; if so increase dose to 200mg twice a day (total daily dose of 400mg)  CMP is fine

## 2022-09-21 RX ORDER — ZONISAMIDE 100 MG/1
200 CAPSULE ORAL EVERY 12 HOURS
Qty: 120 CAPSULE | Refills: 5 | Status: SHIPPED | OUTPATIENT
Start: 2022-09-21

## 2022-11-30 DIAGNOSIS — G40.919 INTRACTABLE EPILEPSY WITHOUT STATUS EPILEPTICUS, UNSPECIFIED EPILEPSY TYPE (HCC): ICD-10-CM

## 2022-11-30 RX ORDER — PHENOBARBITAL 32.4 MG/1
32.4 TABLET ORAL
Qty: 30 TABLET | Refills: 5 | Status: SHIPPED | OUTPATIENT
Start: 2022-11-30

## 2022-11-30 NOTE — TELEPHONE ENCOUNTER
Attempted to review PDMP but website was not able to connect during this encounter  Approved phenobarbital refill request due to need for management of her epilepsy

## 2023-01-05 ENCOUNTER — TELEPHONE (OUTPATIENT)
Dept: NEUROLOGY | Facility: CLINIC | Age: 33
End: 2023-01-05

## 2023-01-05 NOTE — TELEPHONE ENCOUNTER
I called and left a voicemail message reminding the patient of there upcoming appointment with Dr Ureña on 1/20/23 @ 11:30 pm  I requested a call back to our office to confirm the appointment or if the patient has any issues or concerns or cannot keep this appointment   I sent out an appt card with the doctors name , date, time and location of appt

## 2023-01-20 ENCOUNTER — TELEPHONE (OUTPATIENT)
Dept: NEUROLOGY | Facility: CLINIC | Age: 33
End: 2023-01-20

## 2023-01-20 ENCOUNTER — OFFICE VISIT (OUTPATIENT)
Dept: NEUROLOGY | Facility: CLINIC | Age: 33
End: 2023-01-20

## 2023-01-20 VITALS
HEIGHT: 62 IN | HEART RATE: 78 BPM | WEIGHT: 185.2 LBS | BODY MASS INDEX: 34.08 KG/M2 | SYSTOLIC BLOOD PRESSURE: 132 MMHG | TEMPERATURE: 95.7 F | DIASTOLIC BLOOD PRESSURE: 92 MMHG | RESPIRATION RATE: 19 BRPM

## 2023-01-20 DIAGNOSIS — G40.919 INTRACTABLE EPILEPSY WITHOUT STATUS EPILEPTICUS, UNSPECIFIED EPILEPSY TYPE (HCC): Primary | ICD-10-CM

## 2023-01-20 DIAGNOSIS — G40.119 INTRACTABLE FOCAL EPILEPSY (HCC): ICD-10-CM

## 2023-01-20 RX ORDER — ZONISAMIDE 100 MG/1
CAPSULE ORAL
Qty: 150 CAPSULE | Refills: 5 | Status: SHIPPED | OUTPATIENT
Start: 2023-01-20

## 2023-01-20 RX ORDER — PHENOBARBITAL 16.2 MG/1
16.2 TABLET ORAL
Qty: 30 TABLET | Refills: 0 | Status: SHIPPED | OUTPATIENT
Start: 2023-01-20 | End: 2023-02-18

## 2023-01-20 RX ORDER — LAMOTRIGINE 200 MG/1
200 TABLET ORAL 2 TIMES DAILY
Qty: 90 TABLET | Refills: 5 | Status: SHIPPED | OUTPATIENT
Start: 2023-01-20

## 2023-01-20 RX ORDER — LAMOTRIGINE 25 MG/1
50 TABLET ORAL 2 TIMES DAILY
Qty: 120 TABLET | Refills: 5 | Status: SHIPPED | OUTPATIENT
Start: 2023-01-20

## 2023-01-20 NOTE — PROGRESS NOTES
Review of Systems   Constitutional: Negative for chills and fever  HENT: Negative for ear pain and sore throat  Eyes: Negative for pain and visual disturbance  Respiratory: Negative for cough and shortness of breath  Cardiovascular: Negative for chest pain and palpitations  Gastrointestinal: Negative for abdominal pain and vomiting  Genitourinary: Negative for dysuria and hematuria  Musculoskeletal: Positive for neck pain  Negative for arthralgias and back pain  Skin: Negative for color change and rash  Neurological: Positive for tremors (whole body), seizures (12/31/22 5 sz's (pt has logs)), weakness, numbness (bilateral legs) and headaches  Negative for syncope  All other systems reviewed and are negative

## 2023-01-20 NOTE — PROGRESS NOTES
[facility] [facility]  Patient's Name: Magda Baker   Patient's : 1990   Visit Type: follow-up  Referring MD / PCP:  Angel Haynes MD    Assessment:  Ms Magda Baker is a 29 y o  woman with intractable focal epilepsy  Clinical FIAS have features that suggest mesial temporal seizures  Her EEG studies suggest predominantly left hemispheric/temporal seizure onset but there is a high probability of right temporal early involvement  MRI brain study is normal; however, PET/CT brain study also indicates left more the right decreased metabolism  She is generally amnestic to her seizures and has a difficult time monitoring the frequency of her seizures  We will continue to advance the dose of zonisamide and wean her off of phenobarbital as phenobarbital does not seem to be sufficiently suppressing her seizures  Due to the presence of bilateral, likely mesial temporal seizure onset, she is not a candidate for direct resective surgery  She is a candidate for neuromodulatory devices such as VNS, DBS for epilepsy and possibly RNS  She has declined any surgical intervention, including the VNS therapy        Plan:   1 - Continue to take Lamotrigine one 200mg tab AND two 25mg tabs twice a day  2 - finish off phenobarbital 32 4mg tab take one tab at bedtime until no more 32 4mg tabs then change to one 16 2mg tab at bedtime for 30 days then stop taking phenobarbital  3 - increase Zonisamide 100mg cap take two caps in the morning and three caps at bedtime  4 - in 2 months check CMP, lamotrigine, and zonisamide level   5 - please bring your medications to the office visit  6 - please continue to monitor the frequency of your seizures with a seizure calendar; bring your seizure calendar to the office  7 - please continue with folic acid 1mg daily  8 - continue with vitamin D 2000 units once a day  9 - follow-up in 3 months with Advanced Practitioner    If you were to have more seizures will need to add another antiseizure medication, such as Fycompa (perampanel)  I reviewed side effects of Fycompa, which include, but not limited to, mood swings, irritability, suicidal ideation, medication interactions, ataxia, incoordination, cognitive impairment, insomnia and depression and homicidal ideation        Problem List Items Addressed This Visit        Nervous and Auditory    Intractable epilepsy without status epilepticus ([facility] [address] ) - Primary    Relevant Medications    PHENobarbital 16 2 mg tablet    zonisamide (Zonegran) 100 mg capsule    lamoTRIgine (LaMICtal) 200 MG tablet    lamoTRIgine (LaMICtal) 25 mg tablet    Other Relevant Orders    Lamotrigine level    Comprehensive metabolic panel    CBC    Vitamin D 25 hydroxy       Chief Complaint:   Chief Complaint   Patient presents with   • Seizures      HPI:    Garry Velásquez is a 29 y o  right handed female here for follow-up evaluation of intractable epilepsy  Interval History 1/20/2023  She is here with Jass Uribe, her brother in law,  to [place]  She brings a seizure calendar, she had seizures on 10/14/2023, 12/3/2023 x 3, 12/19/2023 x 2 (confused), 12/31/2022  On 12/31, seizure was witnessed by her father  The other seizures were witnessed by her sister, Lakeisha Agarwal  Seizures are confusion no convulsion  She has not noticed much in the change of medication side effects  She endorses that she feels sleepy a lot  She is not certain if her medications are impairing her balance or walking  If asked in open generic terms she does not report any symptoms she reports more fatigue  She may have a little a tremor when she has a seizure but not in general     She continues to not want to pursue any surgical intervention for her epilepsy  She does not want the VNS therapy  She has a little bit of sadness from her epilepsy      She is unable to ready any basic form of English, she did not recognize "We", "American", "do you read"  She only recognizes colors, but she is unable to say "black" or "white"  Last time she wrote anything in English was when she was in school in the [place]  AED/side effects/compliance:  Phenobarbital 32 4mg at bedtime  Lamotrigine 250-250  Zonisamide 200-200  She has a good appetite  Event/Seizure semiology:  1  She starts to make a blowing sound, rigid, behavioral arrest, unable to speak, hand tremors, drops things from hands, then she cannot remember what happened (amnestic to seizure)  2  One lifetime convulsion in 2012? 3  FIAS - videos from mother 9/10/2021 - she is quiet, random head movements, does not answer questions, but no consistent behaviors other than being quiet  (similar to Seizure type 1)    Woman of childbearing age with Epilepsy:  Contraception: not sexually active  Folic acid supplement:  Yes    Prior Epilepsy History:  Intake History 10/22/2019  She was previously seen by Dr Marion Moyer at [facility]  From Dr Flores Wetzel office note:  Seizure type 1 - behavioral arrest, tremoulous, unresponsive, amnestic to seizure, ?masticatory movements, blowing air type of sounds  Seizure type 2 - generalized tonic clonic seizure out of sleep  Patient is from the [facility]  She was diagnosed with seizures at an early age in childhood; initially there were staring episodes; mother was unable to report name of medications that were tried  She had generalized convulsions starting at the age of 25  She was initially on Tegretol  Her last visit with Dr Marion Moyer was on 6/27/2019  She continued to have "a few attacks"  After a seizure she will have a headache  Patient's history: obtained using  by telephone  Darshana Fraire is here with her sister  I started to have Perlita give her own history regarding seizures but she was struggling with the telephone  (not sure how to respond to questions)  She looked to her sister for information    Her sister reports that Perlita started to have seizures about 11 years ago (age 15-16, not during early childhood)  Farhana Rosado has no recollection of when she has a seizure  She did not have seizures as a baby  When she was a child she was "slow" ( used "hidden condition")  When she was born, there was a problem during birth, the doctor was pushing or pulling during the delivery and there was something in the brain that got "disconnected"  Her sister reports that Perlita seizures involve Perlita appearing paralyzed, unable to speak, with hand tremors, rigid and stiff, and forgets what she is doing  If she has something in her hands she just drops it  This seizure can be about a minute and may repeat 3 times within 5 minutes  Farhana Rosado has a warning of making a blowing sound with her mouth  Her last seizure was last night  She has seizures about a couple of times a week  There was one seizure that caused her to go into a convulsion (this happened 7 years ago)  She started a new job last week, she could not work because she had a seizure there  She could not hold a job because she had seizures at work  Summary 2020  -800, PB 90 qHS  When Perlita has a seizure she is standing, appearing like she zoned out  The family does not keep track of the seizure frequency  Prior to the last visit with me, she would have about 8 seizures a month  Caroline thinks that Perlita may have been on carbamazepine longer than phenobarbital   We added lamotrigine and started to reduce her dose of carbamazepine  Caroline, her sister, accompanies Farhana Rosado to her office visits (as assist with [place])  She continued to have seizures  These seizures are still consistent with the Type 1, behavioral arrest and unable to speak  Summary 2021  CBZ--400, PB 90, -150  She comes with her sister Shawn Spence)  Farhana Rosado does not know when she is having a seizure  She lives with her mother and a sister Osito Flako)    Seizures consist of altered awareness and amnesia, she is confused  It is difficult to determine the frequency of her seizures (she is not always watched)  She has difficulty remembering things (she gets confused)  We weaned off of carbamazepine and increased lamotrigine  There continues to be at least 3 seizures per month  Her mother shows me videos of Perlita being quiet, but not having similar repetitive movements, she is quiet looking around, her mother will call her name and ask her what is she doing or where is she but Griselda Klinefelter is confused and does not answer  She has these episodes at work and her boss would notice that she is inattentive/confused and dismissed her from work  She completed EMU study in 2021, found to have bilateral temporal involvement, left epileptiform discharges more frequent than right  However, a few of the clinical seizures were obscured by blanket, happening in the bathroom, or when EEG electrodes were being fixed  Summary   PB 97 2, -250  After her EMU admission, she was supposed to be on lamotrigine 300mg twice a day; but she only has been on -250 and phenobarbital 97 2 at bedtime  She is unaware of when she has a seizure  She at least has a seizure 1-3 times a month as reported by her family  There have been no convulsion; she has periods of confusion and altered awareness  We started to wean her off of phenobarbital to transitions her to zonisamide and higher dose of lamotrigine  By May 2022, there is about one seizure a month  We tried to have her speak to the VNS representative to discuss VNS therapy  Griselda Klinefelter is not interested in surgical intervention  She does not understand English, so she cannot complete neuropsychological testing            Special Features  Status epilepticus: No  Self Injury Seizures: No  Precipitating Factors: None  Post-ictal state: amnestic, confusion    Epilepsy Risk Factors:  Abnormal pregnancy: No  Abnormal birth/: No  Abnormal Development: No  Febrile seizures, simple: No  Febrile seizures, complex: No  CNS infection: No  Mental retardation: No; she was told that she had a learning disability when she was in school; she needed tutors and additional help  Cerebral palsy: No  Head injury (moderate/severe): No  CNS neoplasm: No  CNS malformation: No  Neurosurgical procedure: No  Stroke: No  Alcohol abuse: No  Drug abuse: No  Family history Sz/epilepsy: No    Prior AEDs:  medication Reason to stop   Levetiracetam ?behavioral problem   phenobarbital    carbamazepine    oxcarbazepine    lamotrigine    zonisamide      Prior workup:  x  [id number]  MRI brain - [facility]  Normal MR of brain     2019  MRI brain w/wo  Normal MRI brain study -  No pathology was identified in the left frontal or temporal regions  2021 - PET/CT study  Left temporal hypometabolism; also reduced on the right temporal region    EEGs:  2015 [facility]   Multifocal independent spike-slow waves with phase reversal over the left temporal region T3, T5, Fp1, Fp2, F3 along with generalized spike-slow waves at 1 Hz runs  There are small sharp waves in the bilateral frontal region    -2016 - [facility]  48 hours ambulatory EEG  Generalized spike and waves and polyspike and waves  There is an electrographic seizure but no symptoms were reported  2016 - 11:17 - generalized bifrontal spike-wave complexes with generalized 7-8 Hz activity    3/5/2020  Poorly organized theta/delta slowing and sharply contoured waveforms on the left temporal area  There seems to bifrontal triphasic waves; but on my interpretation of the EEG there are left frontotemporal spike-slow waves  -2020 - 48 hours ambulatory EEG  Frequent left temporal delta/theta activity with left midtemporal sharp waves  Intermittent delta activity, sharp waves over the left frontal anterior temporal region      10/20/2021 - EMU admission  Summary interictal findings:   - left temporal/frontotemporal sharp waves (more frequent during sleep)  - left temporal focal slowing  - right temporal delta activity and suspicious very low voltage spikes (starting on day 4 of admission)  - occasional bitemporal independent irregular delta activity  - slow PDR     Summary event findings:   - She had one seizure captured fully - FIAS left temporal onset, but not well visualized  - She had one seizure the bathroom (suspect that she was confused and taken off some EEG electrodes)- similar left temporal onset, irregular delta activity, but not progressing to rhythmic discharges  - She had one FIAS while EEG leads were being fixed (cannot lateralize)  - She had one seizure during sleep (covered by blanket)- initial rhythmic spikes were over the right temporal region, stopped followed by recurrent rhythmic beta activity over the right hemisphere  - One seizure from sleep with suspected clonic activity (covered by blanket) - (bilateral involvement) there is rhythmic sharp alpha activity over T4, extremely low voltage gamma-beta activity over T3, then there is rhythmic delta activity over the left temporal region    Seizure Classification: focal impaired awareness seizure  Epilepsy Classification: intractable focal epilepsy    She is not going to be able to complete a neuropsychology evaluation due to Bahamian as her only language      Labs:  Component      Latest Ref Rng & Units 9/7/2022 9/9/2022   Sodium      135 - 147 mmol/L 139    Potassium      3 5 - 5 3 mmol/L 4 0    Chloride      96 - 108 mmol/L 109 (H)    CO2      21 - 32 mmol/L 25    Anion Gap      4 - 13 mmol/L 5    BUN      5 - 25 mg/dL 6    Creatinine      0 60 - 1 30 mg/dL 0 74    Glucose, Random      65 - 140 mg/dL 92    Calcium      8 3 - 10 1 mg/dL 8 5    AST      5 - 45 U/L 20    ALT      12 - 78 U/L 35    Alkaline Phosphatase      46 - 116 U/L 131 (H)    Total Protein      6 4 - 8 4 g/dL 7 8    Albumin      3 5 - 5 0 g/dL 3 5    TOTAL BILIRUBIN      0 20 - 1 00 mg/dL 0 34    eGFR      ml/min/1 73sq m 109    ZONISAMIDE LEVEL      10 0 - 40 0 ug/mL  9 0 (L)   LAMOTRIGINE LEVEL      2 0 - 20 0 ug/mL  13 5     General exam   /92 (BP Location: Left arm, Patient Position: Sitting, Cuff Size: Standard)   Pulse 78   Temp (!) 95 7 °F (35 4 °C) (Tympanic)   Resp 19   Ht 5' 2" (1 575 m)   Wt 84 kg (185 lb 3 2 oz)   BMI 33 87 kg/m²    Appearance: normally developed, appears well  Carotids: not assessed  Cardiovascular: regular rate and rhythm and no murmur is present  Pulmonary: clear to auscultation  Abdominal: obese, nontender    HEENT: anicteric   Fundoscopy: not assessed    Mental status  Orientation: alert and oriented to name, place, time  Fund of Knowledge: limited   Attention and Concentration: intact  Current and Remote Memory:intact  Language: spontaneous speech is normal and comprehension is intact; she is unable to read [place] or form sentences in [place]  Cranial Nerves  CN 1: not tested  CN 2: pupils equal round reactive to direct and consenual light   CN 3, 4, 6: EOMI, no nystagmus  CN 5:not assessed  CN 7:muscles of facial expression are symmetric  CN 8:not assessed  CN 9, 10:no dysarthria present  CN 11:not assessed  CN 12:tongue is midline    Motor:  Bulk, Tone: normal bulk, normal tone  Pronation: normal barrel roll  Strength: Symmetric strength of the arms and legs, no lateralizing weakness     Sensory:  Lighttouch: intact in all limbs  Romberg:not assessed    Coordination:  FNF:FNF bilaterally intact  PAULINO:intact  FFM:intact  Gait/Station:normal gait and normal tandem gait    Reflexes:  Not assessed    Past Medical/Surgical History:  Patient Active Problem List   Diagnosis   • Body mass index (BMI) of 37 0 to 37 9 in adult   • Intellectual disability   • Intractable epilepsy without status epilepticus (HCC)   • Anticonvulsant drug-induced osteomalacia   • Vitamin D deficiency     No past surgical history on file      Past Psychiatric History:  Depression: Yes  Anxiety: No  Psychosis: No    Medications:    Current Outpatient Medications:   •  lamoTRIgine (LaMICtal) 200 MG tablet, Take 1 tablet (200 mg total) by mouth 2 (two) times a day With two 25mg tabs, Disp: 90 tablet, Rfl: 5  •  lamoTRIgine (LaMICtal) 25 mg tablet, Take 2 tablets (50 mg total) by mouth 2 (two) times a day With one 200mg tab, Disp: 120 tablet, Rfl: 5  •  PHENobarbital 16 2 mg tablet, Take 1 tablet (16 2 mg total) by mouth daily at bedtime for 30 doses, Disp: 30 tablet, Rfl: 0  •  Vitamin D, Cholecalciferol, 50 MCG (2000 UT) CAPS, Take 1 capsule by mouth daily, Disp: 30 capsule, Rfl: 11  •  zonisamide (Zonegran) 100 mg capsule, Take by mouth two capsules in the morning and three capsules at bedtime, Disp: 150 capsule, Rfl: 5  •  folic acid (FOLVITE) 1 mg tablet, Take 1 tablet (1 mg total) by mouth in the morning  (Patient not taking: Reported on 1/20/2023), Disp: 30 tablet, Rfl: 11    Allergies:  No Known Allergies    Family history:  Family History   Problem Relation Age of Onset   • Hypertension Maternal Grandmother    • Colon polyps Mother    • Glaucoma Mother    • Asthma Sister      There is no family history of seizure, epilepsy or developmental delay  Social History  Living situation:  Lives with family  Work:  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, not currently working, last attempt at a job was a year ago was at [employer] as , she was dismissed because she had a seizure at work  Driving:  No   reports that she has never smoked  She has never used smokeless tobacco  She reports that she does not drink alcohol and does not use drugs  Review of Systems  A review of at least 12 organ/systems was obtained by the medical assistant and reviewed by me, including additional positives/negatives:  Musculoskeletal: Positive for neck pain  Negative for arthralgias and back pain  Skin: Negative for color change and rash     Neurological: Positive for tremors (whole body), seizures (12/31/22 5 sz's (pt has logs)), weakness, numbness (bilateral legs) and headaches  Negative for syncope  Decision making was of high-complexity due to the patient's high risk condition (seizures), psychiatric and neuropsychological comorbidities, behavioral problems, memory and cognitive problems and medication side effects  The total amount of time spent with the patient along with pre-chart and post-chart preparation was 65 minutes on the calendar day of the date of service  This included history taking, physical exam, review of ancillary testing, counseling provided to the patient regarding diagnosis, medications, treatment, and risk management, and other communication to the patient's providers and/or family    Start time: 11:45AM  End time: 12:50AM

## 2023-01-20 NOTE — PATIENT INSTRUCTIONS
Plan:   1 - Continue to take Lamotrigine one 200mg tab AND two 25mg tabs twice a day  2 - finish off phenobarbital 32 4mg tab take one tab at bedtime until no more 32 4mg tabs then change to one 16 2mg tab at bedtime for 30 days then stop taking phenobarbital  3 - increase Zonisamide 100mg cap take two caps in the morning and three caps at bedtime  4 - in 2 months check CMP, lamotrigine, and zonisamide level   5 - please bring your medications to the office visit  6 - please continue to monitor the frequency of your seizures with a seizure calendar; bring your seizure calendar to the office  7 - please continue with folic acid 1mg daily  8 - continue with vitamin D 2000 units once a day  9 - follow-up in 3 months with Advanced Practitioner    If you were to have more seizures will need to add another antiseizure medication, such as Fycompa (perampanel)  I reviewed side effects of Fycompa, which include, but not limited to, mood swings, irritability, suicidal ideation, medication interactions, ataxia, incoordination, cognitive impairment, insomnia and depression and homicidal ideation  Kaykay Loving

## 2023-01-20 NOTE — TELEPHONE ENCOUNTER
Patient presented FMLA forms to be filled out by us  Forms are scanned into this encounter  Charge of $40 was dropped, but payment has not been received  As per Per Dr Cal Rodriguez we will collect fee at completion of forms

## 2023-01-31 ENCOUNTER — TELEPHONE (OUTPATIENT)
Dept: NEUROLOGY | Facility: CLINIC | Age: 33
End: 2023-01-31

## 2023-04-19 ENCOUNTER — APPOINTMENT (OUTPATIENT)
Dept: LAB | Facility: MEDICAL CENTER | Age: 33
End: 2023-04-19

## 2023-04-19 DIAGNOSIS — G40.919 INTRACTABLE EPILEPSY WITHOUT STATUS EPILEPTICUS, UNSPECIFIED EPILEPSY TYPE (HCC): ICD-10-CM

## 2023-04-19 LAB
25(OH)D3 SERPL-MCNC: 15 NG/ML (ref 30–100)
ALBUMIN SERPL BCP-MCNC: 3.9 G/DL (ref 3.5–5)
ALP SERPL-CCNC: 131 U/L (ref 46–116)
ALT SERPL W P-5'-P-CCNC: 27 U/L (ref 12–78)
ANION GAP SERPL CALCULATED.3IONS-SCNC: 3 MMOL/L (ref 4–13)
AST SERPL W P-5'-P-CCNC: 17 U/L (ref 5–45)
BILIRUB SERPL-MCNC: 0.16 MG/DL (ref 0.2–1)
BUN SERPL-MCNC: 10 MG/DL (ref 5–25)
CALCIUM SERPL-MCNC: 9.3 MG/DL (ref 8.3–10.1)
CHLORIDE SERPL-SCNC: 109 MMOL/L (ref 96–108)
CO2 SERPL-SCNC: 26 MMOL/L (ref 21–32)
CREAT SERPL-MCNC: 0.74 MG/DL (ref 0.6–1.3)
ERYTHROCYTE [DISTWIDTH] IN BLOOD BY AUTOMATED COUNT: 12.8 % (ref 11.6–15.1)
GFR SERPL CREATININE-BSD FRML MDRD: 109 ML/MIN/1.73SQ M
GLUCOSE SERPL-MCNC: 89 MG/DL (ref 65–140)
HCT VFR BLD AUTO: 44.4 % (ref 34.8–46.1)
HGB BLD-MCNC: 14.8 G/DL (ref 11.5–15.4)
MCH RBC QN AUTO: 31.6 PG (ref 26.8–34.3)
MCHC RBC AUTO-ENTMCNC: 33.3 G/DL (ref 31.4–37.4)
MCV RBC AUTO: 95 FL (ref 82–98)
PLATELET # BLD AUTO: 372 THOUSANDS/UL (ref 149–390)
PMV BLD AUTO: 10 FL (ref 8.9–12.7)
POTASSIUM SERPL-SCNC: 4 MMOL/L (ref 3.5–5.3)
PROT SERPL-MCNC: 7.8 G/DL (ref 6.4–8.4)
RBC # BLD AUTO: 4.69 MILLION/UL (ref 3.81–5.12)
SODIUM SERPL-SCNC: 138 MMOL/L (ref 135–147)
WBC # BLD AUTO: 5.84 THOUSAND/UL (ref 4.31–10.16)

## 2023-04-21 ENCOUNTER — OFFICE VISIT (OUTPATIENT)
Dept: NEUROLOGY | Facility: CLINIC | Age: 33
End: 2023-04-21

## 2023-04-21 VITALS
HEIGHT: 62 IN | SYSTOLIC BLOOD PRESSURE: 130 MMHG | BODY MASS INDEX: 31.87 KG/M2 | TEMPERATURE: 98 F | OXYGEN SATURATION: 98 % | DIASTOLIC BLOOD PRESSURE: 87 MMHG | HEART RATE: 99 BPM | WEIGHT: 173.2 LBS | RESPIRATION RATE: 18 BRPM

## 2023-04-21 DIAGNOSIS — G40.919 INTRACTABLE EPILEPSY WITHOUT STATUS EPILEPTICUS, UNSPECIFIED EPILEPSY TYPE (HCC): Primary | ICD-10-CM

## 2023-04-21 DIAGNOSIS — E55.9 VITAMIN D DEFICIENCY: ICD-10-CM

## 2023-04-21 LAB — LAMOTRIGINE SERPL-MCNC: 12.3 UG/ML (ref 2–20)

## 2023-04-21 RX ORDER — ERGOCALCIFEROL 1.25 MG/1
50000 CAPSULE ORAL WEEKLY
Qty: 8 CAPSULE | Refills: 0 | Status: SHIPPED | OUTPATIENT
Start: 2023-04-21

## 2023-04-21 NOTE — PROGRESS NOTES
Patient ID: Tracey Wells is a 29 y o  female  Assessment:  Ms  Tracey Wells is a 29 y o  woman with intractable focal epilepsy  Clinical FIAS have features that suggest mesial temporal seizures  Her EEG studies suggest predominantly left hemispheric/temporal seizure onset but there is a high probability of right temporal early involvement  MRI brain study is normal; however, PET/CT brain study also indicates left more the right decreased metabolism  She is generally amnestic to her seizures and has a difficult time monitoring the frequency of her seizures  We have been advancing the dose of zonisamide and weaning her off phenobarbital, as it did not seem to be sufficiently suppressing her seizures  She is now completely off phenobarbital and her sister reports there have not been any seizures since Jan 2023 when it was stopped  She seems to be tolerating higher dose of zonisamide well  She has lost some weight, but sister feels her appetite is still good, and patient is not underweight  She had AED levels checked 2 days ago, but unfortunately, a zonisamide level was not drawn  Will order this to be completed  Due to the presence of bilateral, likely mesial temporal seizure onset, she is not a candidate for direct resective surgery  She is a candidate for neuromodulatory devices such as VNS, DBS for epilepsy and possibly RNS  She has declined any surgical intervention, including the VNS therapy          Plan:  1 - Continue to take Lamotrigine one 200mg tab AND two 25mg tabs twice a day  2 - Continue Zonisamide 100mg cap take two caps in the morning and three caps at bedtime  3 - please go back to the lab and get a zonisamide level done   4 - please bring your medications to the office visit  5 - please continue to monitor the frequency of your seizures with a seizure calendar; bring your seizure calendar to the office  6 - start vitamin D Rx (ergocalciferol) 50,000IU once a week x 8 weeks  7 - continue with folic acid 1mg daily  8 - continue with vitamin D 2000 units once a day  9 - follow-up in 3 months with Advanced Practitioner, 6 months with Dr Aimee Magaña       Diagnoses and all orders for this visit:    Intractable epilepsy without status epilepticus, unspecified epilepsy type ([facility] [address] )  -     Zonisamide level; Future    Vitamin D deficiency  -     ergocalciferol (VITAMIN D2) 50,000 units; Take 1 capsule (50,000 Units total) by mouth once a week         Subjective:    AKIN Bloom Bill Castro is a 29 y o  right handed female here for follow-up evaluation of intractable epilepsy  Interval History 4/21/2023  She is here with her sister Michele Carlisle  At timing of last visit, zonisamide was increased and plan was to wean off phenobarbital   She initially did not follow the weaning instructions and stopped taking her medication abruptly  When this happened, her sister says there was about a week of Perlita having some episodes of confusion  They contacted our office and were advised to go back on the lower dose of phenobarbital (she was supposed to go from 32 4mg at bedtime to 16 2mg at bedtime, but abruptly stopped the 32 4mg without lowering the dose)  She then took 16 2mg at bedtime x 30 days, and then discontinued completely  There were no issues when discontinuing the lower dose  Her sister says they have not noticed any seizures since discontinuing the phenobarbital   She seems to be tolerating the zonisamide well  She has lost a little weight, but appetite still seems fine, per her sister  She had labs drawn 2 days ago, but the zonisamide level was not ordered  She continues to not want to pursue any surgical intervention for her epilepsy  She does not want the VNS therapy  AED/side effects/compliance:  Lamotrigine 250-250  Zonisamide 200-300  She has a good appetite  Event/Seizure semiology:  1   She starts to make a blowing sound, rigid, behavioral "arrest, unable to speak, hand tremors, drops things from hands, then she cannot remember what happened (amnestic to seizure)  2  One lifetime convulsion in 2012? 3  FIAS - videos from mother 9/10/2021 - she is quiet, random head movements, does not answer questions, but no consistent behaviors other than being quiet  (similar to Seizure type 1)     Woman of childbearing age with Epilepsy:  Contraception: not sexually active  Folic acid supplement: Yes     Prior Epilepsy History:  Intake History 10/22/2019  She was previously seen by Dr Alisa Cunningham at [facility]  From Dr Rancho Beavers office note:  Seizure type 1 - behavioral arrest, tremoulous, unresponsive, amnestic to seizure, ?masticatory movements, blowing air type of sounds  Seizure type 2 - generalized tonic clonic seizure out of sleep  Patient is from the [facility]  She was diagnosed with seizures at an early age in childhood; initially there were staring episodes; mother was unable to report name of medications that were tried  She had generalized convulsions starting at the age of 25  She was initially on Tegretol  Her last visit with Dr Alisa Cunningham was on 6/27/2019  She continued to have \"a few attacks\"  After a seizure she will have a headache  Patient's history: obtained using  by telephone  Indy Godinez is here with her sister  I started to have Perlita give her own history regarding seizures but she was struggling with the telephone  (not sure how to respond to questions)  She looked to her sister for information  Her sister reports that Indy Godinez started to have seizures about 11 years ago (age 15-16, not during early childhood)  Indy Godinez has no recollection of when she has a seizure  She did not have seizures as a baby  When she was a child she was \"slow\" ( used \"hidden condition\")   When she was born, there was a problem during birth, the doctor was pushing or pulling during the delivery and there was something in the brain " "that got \"disconnected\"  Her sister reports that Perlita seizures involve Perlita appearing paralyzed, unable to speak, with hand tremors, rigid and stiff, and forgets what she is doing  If she has something in her hands she just drops it  This seizure can be about a minute and may repeat 3 times within 5 minutes  Yisel Barron has a warning of making a blowing sound with her mouth  Her last seizure was last night  She has seizures about a couple of times a week  There was one seizure that caused her to go into a convulsion (this happened 7 years ago)  She started a new job last week, she could not work because she had a seizure there  She could not hold a job because she had seizures at work  Summary 2020  -800, PB 90 qHS  When Perlita has a seizure she is standing, appearing like she zoned out  The family does not keep track of the seizure frequency  Prior to the last visit with me, she would have about 8 seizures a month  Caroline thinks that Perlita may have been on carbamazepine longer than phenobarbital  We added lamotrigine and started to reduce her dose of carbamazepine  Caroline, her sister, accompanies Yisel Barron to her office visits (as assist with [place])  She continued to have seizures  These seizures are still consistent with the Type 1, behavioral arrest and unable to speak  Summary 2021  CBZ--400, PB 90, -150  She comes with her sister Mandy Velasquez)  Yisel Barron does not know when she is having a seizure  She lives with her mother and a sister Joaquin Rack)  Seizures consist of altered awareness and amnesia, she is confused  It is difficult to determine the frequency of her seizures (she is not always watched)  She has difficulty remembering things (she gets confused)  We weaned off of carbamazepine and increased lamotrigine  There continues to be at least 3 seizures per month    Her mother shows me videos of Perlita being quiet, but not having similar repetitive movements, she is quiet looking around, her " mother will call her name and ask her what is she doing or where is she but Elba Cooper is confused and does not answer  She has these episodes at work and her boss would notice that she is inattentive/confused and dismissed her from work  She completed EMU study in October 2021, found to have bilateral temporal involvement, left epileptiform discharges more frequent than right  However, a few of the clinical seizures were obscured by blanket, happening in the bathroom, or when EEG electrodes were being fixed  Summary 2022  PB 97 2, -250  After her EMU admission, she was supposed to be on lamotrigine 300mg twice a day; but she only has been on -250 and phenobarbital 97 2 at bedtime  She is unaware of when she has a seizure  She at least has a seizure 1-3 times a month as reported by her family  There have been no convulsion; she has periods of confusion and altered awareness  We started to wean her off of phenobarbital to transitions her to zonisamide and higher dose of lamotrigine  By May 2022, there is about one seizure a month  We tried to have her speak to the VNS representative to discuss VNS therapy  Elba Cooper is not interested in surgical intervention  She does not understand English, so she cannot complete neuropsychological testing  Interval History 1/20/2023  She is here with Juanita Browning, her brother in law,  to [place]  She brings a seizure calendar, she had seizures on 10/14/2023, 12/3/2023 x 3, 12/19/2023 x 2 (confused), 12/31/2022  On 12/31, seizure was witnessed by her father  The other seizures were witnessed by her sister, Kyara Flores  Seizures are confusion no convulsion  She has not noticed much in the change of medication side effects  She endorses that she feels sleepy a lot  She is not certain if her medications are impairing her balance or walking  If asked in open generic terms she does not report any symptoms she reports more fatigue   She may have a little a tremor "when she has a seizure but not in general    She continues to not want to pursue any surgical intervention for her epilepsy  She does not want the VNS therapy  She has a little bit of sadness from her epilepsy  She is unable to ready any basic form of English, she did not recognize \"We\", \"American\", \"do you read\"  She only recognizes colors, but she is unable to say \"black\" or \"white\"  Last time she wrote anything in English was when she was in school in the   [facility]  Status epilepticus: No  Self Injury Seizures: No  Precipitating Factors: None  Post-ictal state: amnestic, confusion     Epilepsy Risk Factors:  Abnormal pregnancy: No  Abnormal birth/: No  Abnormal Development: No  Febrile seizures, simple: No  Febrile seizures, complex: No  CNS infection: No  Mental retardation: No; she was told that she had a learning disability when she was in school; she needed tutors and additional help  Cerebral palsy: No  Head injury (moderate/severe): No  CNS neoplasm: No  CNS malformation: No  Neurosurgical procedure: No  Stroke: No  Alcohol abuse: No  Drug abuse: No  Family history Sz/epilepsy: No     Prior AEDs:  medication Reason to stop   Levetiracetam ?behavioral problem   phenobarbital     carbamazepine     oxcarbazepine     lamotrigine     zonisamide        Prior workup:  x  [id number]  MRI brain - [facility]  Normal MR of brain      2019  MRI brain w/wo  Normal MRI brain study - No pathology was identified in the left frontal or temporal regions  2021 - PET/CT study  Left temporal hypometabolism; also reduced on the right temporal region     EEGs:  2015 [facility]   Multifocal independent spike-slow waves with phase reversal over the left temporal region T3, T5, Fp1, Fp2, F3 along with generalized spike-slow waves at 1 Hz runs    There are small sharp waves in the bilateral frontal region     -2016 - [facility]  48 hours ambulatory EEG  Generalized spike and waves and " polyspike and waves  There is an electrographic seizure but no symptoms were reported  7/11/2016 - 11:17 - generalized bifrontal spike-wave complexes with generalized 7-8 Hz activity     3/5/2020  Poorly organized theta/delta slowing and sharply contoured waveforms on the left temporal area  There seems to bifrontal triphasic waves; but on my interpretation of the EEG there are left frontotemporal spike-slow waves  9/9-9/11/2020 - 48 hours ambulatory EEG  Frequent left temporal delta/theta activity with left midtemporal sharp waves  Intermittent delta activity, sharp waves over the left frontal anterior temporal region       10/20/2021 - EMU admission  Summary interictal findings:   - left temporal/frontotemporal sharp waves (more frequent during sleep)  - left temporal focal slowing  - right temporal delta activity and suspicious very low voltage spikes (starting on day 4 of admission)  - occasional bitemporal independent irregular delta activity  - slow PDR     Summary event findings:   - She had one seizure captured fully - FIAS left temporal onset, but not well visualized  - She had one seizure the bathroom (suspect that she was confused and taken off some EEG electrodes)- similar left temporal onset, irregular delta activity, but not progressing to rhythmic discharges  - She had one FIAS while EEG leads were being fixed (cannot lateralize)  - She had one seizure during sleep (covered by blanket)- initial rhythmic spikes were over the right temporal region, stopped followed by recurrent rhythmic beta activity over the right hemisphere  - One seizure from sleep with suspected clonic activity (covered by blanket) - (bilateral involvement) there is rhythmic sharp alpha activity over T4, extremely low voltage gamma-beta activity over T3, then there is rhythmic delta activity over the left temporal region    Seizure Classification: focal impaired awareness seizure  Epilepsy Classification: intractable focal epilepsy     She is not going to be able to complete a neuropsychology evaluation due to [place] as her only language  Labs:  4/19/23  Component Value Flag Ref Range Units Status   Lamotrigine Lvl 12 3   2 0 - 20 0 ug/mL Final     Component Value Flag Ref Range Units Status   Vit D, 25-Hydroxy 15 0   Low   30 0 - 100 0 ng/mL Final     Component Value Flag Ref Range Units Status   WBC 5 84   4 31 - 10 16 Thousand/uL Final   RBC 4 69   3 81 - 5 12 Million/uL Final   Hemoglobin 14 8   11 5 - 15 4 g/dL Final   Hematocrit 44 4   34 8 - 46 1 % Final   MCV 95   82 - 98 fL Final   MCH 31 6   26 8 - 34 3 pg Final   MCHC 33 3   31 4 - 37 4 g/dL Final   RDW 12 8   11 6 - 15 1 % Final   Platelets 799   648 - 390 Thousands/uL Final   MPV 10 0   8 9 - 12 7 fL Final     Component Value Flag Ref Range Units Status   Sodium 138   135 - 147 mmol/L Final   Potassium 4 0   3 5 - 5 3 mmol/L Final   Chloride 109   High   96 - 108 mmol/L Final   CO2 26   21 - 32 mmol/L Final   ANION GAP 3   Low   4 - 13 mmol/L Final   BUN 10   5 - 25 mg/dL Final   Creatinine 0 74   0 60 - 1 30 mg/dL Final   Comment:   Standardized to IDMS reference method   Glucose 89   65 - 140 mg/dL Final   Comment:   If the patient is fasting, the ADA then defines impaired fasting glucose as > 100 mg/dL and diabetes as > or equal to 123 mg/dL  Specimen collection should occur prior to Sulfasalazine administration due to the potential for falsely depressed results  Specimen collection should occur prior to Sulfapyridine administration due to the potential for falsely elevated results  Calcium 9 3   8 3 - 10 1 mg/dL Final   AST 17   5 - 45 U/L Final   Comment:   Specimen collection should occur prior to Sulfasalazine administration due to the potential for falsely depressed results      ALT 27   12 - 78 U/L Final   Comment:   Specimen collection should occur prior to Sulfasalazine and/or Sulfapyridine administration due to the potential for falsely depressed "results  Alkaline Phosphatase 131   High   46 - 116 U/L Final   Total Protein 7 8   6 4 - 8 4 g/dL Final   Albumin 3 9   3 5 - 5 0 g/dL Final   Total Bilirubin 0 16   Low   0 20 - 1 00 mg/dL Final   Comment:   Use of this assay is not recommended for patients undergoing treatment with eltrombopag due to the potential for falsely elevated results  eGFR 109    ml/min/1 73sq m Final         The following portions of the patient's history were reviewed and updated as appropriate: current medications, past family history, past medical history, past social history, past surgical history and problem list        Objective:    Blood pressure 130/87, pulse 99, temperature 98 °F (36 7 °C), temperature source Tympanic, resp  rate 18, height 5' 2\" (1 575 m), weight 78 6 kg (173 lb 3 2 oz), SpO2 98 %  Physical Exam  Constitutional:       Appearance: Normal appearance  HENT:      Head: Normocephalic and atraumatic  Eyes:      Extraocular Movements: EOM normal       Pupils: Pupils are equal, round, and reactive to light  Neurological:      Mental Status: She is alert  Motor: Motor strength is normal       Deep Tendon Reflexes: Reflexes are normal and symmetric  Psychiatric:         Mood and Affect: Mood normal          Speech: Speech normal          Behavior: Behavior normal          Neurological Exam  Mental Status  Alert  Oriented to person, place, time and situation  Speech is normal  Language is fluent with no aphasia  Attention and concentration are normal     Cranial Nerves  CN II: Visual fields full to confrontation  CN III, IV, VI: Extraocular movements intact bilaterally  Pupils equal round and reactive to light bilaterally  CN V: Facial sensation is normal   CN VII: Full and symmetric facial movement  CN VIII: Hearing is normal   CN IX, X: Palate elevates symmetrically  CN XI: Shoulder shrug strength is normal   CN XII: Tongue midline without atrophy or fasciculations      Motor   Normal muscle " tone  Strength is 5/5 throughout all four extremities  Sensory  Light touch is normal in upper and lower extremities  Reflexes  Deep tendon reflexes are 2+ and symmetric in all four extremities  Coordination  Right: Finger-to-nose normal Left: Finger-to-nose normal     Gait  Casual gait is normal including stance, stride, and arm swing  Current Outpatient Medications   Medication Sig Dispense Refill   • ergocalciferol (VITAMIN D2) 50,000 units Take 1 capsule (50,000 Units total) by mouth once a week 8 capsule 0   • lamoTRIgine (LaMICtal) 200 MG tablet Take 1 tablet (200 mg total) by mouth 2 (two) times a day With two 25mg tabs 90 tablet 5   • lamoTRIgine (LaMICtal) 25 mg tablet Take 2 tablets (50 mg total) by mouth 2 (two) times a day With one 200mg tab 120 tablet 5   • Vitamin D, Cholecalciferol, 50 MCG (2000 UT) CAPS Take 1 capsule by mouth daily 30 capsule 11   • zonisamide (Zonegran) 100 mg capsule Take by mouth two capsules in the morning and three capsules at bedtime 150 capsule 5   • folic acid (FOLVITE) 1 mg tablet Take 1 tablet (1 mg total) by mouth in the morning  (Patient not taking: Reported on 1/20/2023) 30 tablet 11     No current facility-administered medications for this visit  Past Medical History:   Diagnosis Date   • Seizures ([facility] [address] )       History reviewed  No pertinent surgical history  Family History   Problem Relation Age of Onset   • Hypertension Maternal Grandmother    • Colon polyps Mother    • Glaucoma Mother    • Asthma Sister       No Known Allergies     Past Psychiatric History:  Depression: Yes  Anxiety: No  Psychosis: No    Social History  Living situation:  Lives with family  Work:  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, not currently working, last attempt at a job was a year ago was at [employer] as , she was dismissed because she had a seizure at work  Driving:  No   reports that she has never smoked   She has never used smokeless tobacco  She reports that she does not drink alcohol and does not use drugs  ROS:    Review of Systems   Constitutional: Negative  Negative for appetite change and fever  HENT: Negative  Negative for hearing loss, tinnitus, trouble swallowing and voice change  Eyes: Negative  Negative for photophobia, pain and visual disturbance  Respiratory: Negative  Negative for shortness of breath  Cardiovascular: Negative  Negative for palpitations  Gastrointestinal: Negative  Negative for nausea and vomiting  Endocrine: Negative  Negative for cold intolerance  Genitourinary: Negative  Negative for dysuria, frequency and urgency  Musculoskeletal: Negative  Negative for gait problem, myalgias and neck pain  Skin: Negative  Negative for rash  Allergic/Immunologic: Negative  Neurological: Negative  Negative for dizziness, tremors, seizures, syncope, facial asymmetry, speech difficulty, weakness, light-headedness, numbness and headaches  Hematological: Negative  Does not bruise/bleed easily  Psychiatric/Behavioral: Negative for confusion, hallucinations and sleep disturbance          Memory issues at times- short term     I personally reviewed and updated the ROS as appropriate

## 2023-04-21 NOTE — PATIENT INSTRUCTIONS
Plan:   1 - Continue to take Lamotrigine one 200mg tab AND two 25mg tabs twice a day  2 - Continue Zonisamide 100mg cap take two caps in the morning and three caps at bedtime  3 - please go back to the lab and get a zonisamide level done   4 - please bring your medications to the office visit  5 - please continue to monitor the frequency of your seizures with a seizure calendar; bring your seizure calendar to the office  6 - start vitamin D Rx (ergocalciferol) 50,000IU once a week x 8 weeks  7 - please continue with folic acid 1mg daily  8 - continue with vitamin D 2000 units once a day  9 - follow-up in 3 months with Advanced Practitioner, 6 months with Dr Pablo Ward

## 2023-06-16 DIAGNOSIS — E55.9 VITAMIN D DEFICIENCY: ICD-10-CM

## 2023-06-16 RX ORDER — ERGOCALCIFEROL 1.25 MG/1
CAPSULE ORAL
Qty: 8 CAPSULE | Refills: 0 | Status: SHIPPED | OUTPATIENT
Start: 2023-06-16

## 2023-07-27 ENCOUNTER — APPOINTMENT (OUTPATIENT)
Dept: LAB | Age: 33
End: 2023-07-27
Payer: MEDICARE

## 2023-07-27 DIAGNOSIS — G40.919 INTRACTABLE EPILEPSY WITHOUT STATUS EPILEPTICUS, UNSPECIFIED EPILEPSY TYPE (HCC): ICD-10-CM

## 2023-07-27 PROCEDURE — 36415 COLL VENOUS BLD VENIPUNCTURE: CPT

## 2023-07-27 PROCEDURE — 80203 DRUG SCREEN QUANT ZONISAMIDE: CPT

## 2023-07-28 ENCOUNTER — OFFICE VISIT (OUTPATIENT)
Dept: NEUROLOGY | Facility: CLINIC | Age: 33
End: 2023-07-28
Payer: MEDICARE

## 2023-07-28 VITALS
SYSTOLIC BLOOD PRESSURE: 126 MMHG | WEIGHT: 174 LBS | OXYGEN SATURATION: 97 % | RESPIRATION RATE: 18 BRPM | HEIGHT: 62 IN | BODY MASS INDEX: 32.02 KG/M2 | HEART RATE: 106 BPM | DIASTOLIC BLOOD PRESSURE: 84 MMHG | TEMPERATURE: 97.9 F

## 2023-07-28 DIAGNOSIS — E55.9 VITAMIN D DEFICIENCY: ICD-10-CM

## 2023-07-28 DIAGNOSIS — G40.919 INTRACTABLE EPILEPSY WITHOUT STATUS EPILEPTICUS, UNSPECIFIED EPILEPSY TYPE (HCC): Primary | ICD-10-CM

## 2023-07-28 PROCEDURE — 99214 OFFICE O/P EST MOD 30 MIN: CPT | Performed by: PHYSICIAN ASSISTANT

## 2023-07-28 RX ORDER — ZONISAMIDE 100 MG/1
CAPSULE ORAL
Qty: 180 CAPSULE | Refills: 5 | Status: SHIPPED | OUTPATIENT
Start: 2023-07-28

## 2023-07-28 NOTE — PATIENT INSTRUCTIONS
Plan:  1 - Continue to take Lamotrigine one 200mg tab AND two 25mg tabs twice a day  2 - Increase Zonisamide 100mg cap take three capsules twice a day  3 - prior to next visit, get blood work done  4 - please continue to monitor the frequency of your seizures with a seizure calendar; bring your seizure calendar to the office  5 - continue vitamin D Rx (ergocalciferol) 50,000IU once a week x 8 weeks.   Once that is finished, continue just the 2000IU every day  6 - continue with folic acid 1mg daily  7 - follow-up in 3 months with Dr. Alisha Marie

## 2023-07-28 NOTE — PROGRESS NOTES
Patient ID: Ale Min is a 29 y.o. female. Assessment:  Ms. Ale Min is a 29 y.o. woman with intractable focal epilepsy. Clinical FIAS have features that suggest mesial temporal seizures. Her EEG studies suggest predominantly left hemispheric/temporal seizure onset but there is a high probability of right temporal early involvement. MRI brain study is normal; however, PET/CT brain study also indicates left more the right decreased metabolism. She is generally amnestic to her seizures and has a difficult time monitoring the frequency of her seizures. Her zonisamide dose has been increased and she was weaned off phenobarbital at the beginning of this year, continues on lamotrigine as well. She was doing well and seizures have been less frequent (went sever months without a seizure even), but she has had a few breakthrough seizures since her last visit (unfortunately unable to know exactly how many, as she is not keeping track, maybe 1-2 per month). She is tolerating the higher doses of zonisamide well, no reported side effects, and her sister actually feels mood is better. Weight has remained stable, appetite is good. Will increase zonisamide at this time. She did have a serum level checked yesterday, results pending. It is NOT a trough level (she took her medication 2-3 hours prior to the blood draw). Due to the presence of bilateral, likely mesial temporal seizure onset, she is not a candidate for direct resective surgery. She is a candidate for neuromodulatory devices such as VNS, DBS for epilepsy and possibly RNS. She has declined any surgical intervention, including the VNS therapy.         Plan:  1 - Continue to take Lamotrigine one 200mg tab AND two 25mg tabs twice a day  2 - Increase Zonisamide 100mg cap take three capsules twice a day  3 - prior to next visit, get blood work done  4 - please continue to monitor the frequency of your seizures with a seizure calendar; bring your seizure calendar to the office  5 - continue vitamin D Rx (ergocalciferol) 50,000IU once a week x 8 weeks. Once that is finished, continue the 2000IU every day  6 - continue with folic acid 1mg daily  7 - follow-up in 3 months with Dr. Sruthi Godoy       Diagnoses and all orders for this visit:    Intractable epilepsy without status epilepticus, unspecified epilepsy type ([address])  -     Lamotrigine level; Future  -     Zonisamide level; Future  -     zonisamide (Zonegran) 100 mg capsule; Take three capsules by mouth twice a day  -     CBC and differential; Future  -     Comprehensive metabolic panel; Future    Vitamin D deficiency  -     Vitamin D 25 hydroxy; Future           Subjective:    HPI    Perlita Merle Alisa Bucio is a 29 y.o. right handed female here for follow-up evaluation of intractable epilepsy. Interval History 7/28/2023  At timing of last visit, she had been off phenobarbital and taking both zonisamide and lamotrigine. She was seizure-free and tolerating the medications well. No changes were made. Today, she is here with her sister Dahiana Obrien. She has had some seizures since the last visit, but they did not bring any seizure calendars with exact dates. Stephanie Shepherd says she has calendars, but misplaced them. They know she had one on 6/26/23, one last week, and one yesterday. Alexander Beatty was home and recalls being on the second floor of the house, and then the next thing she recalls is being on the first floor and not knowing how she got there, so she assumes she had a seizure. No descriptions of the other seizures. She is tolerating her medications well, sister says her mood has been a lot better with higher doses of zonisamide it seems. She is not missing any doses of meds, confirmed how she takes the medications with me exactly. She had zonisamide level drawn yesterday, about 2.5 hours after taking her medications. Level is pending.       AED/side effects/compliance:  Lamotrigine 250-250  Zonisamide 200-300  She has a good appetite. No side effects. Compliant      Event/Seizure semiology:  1. She starts to make a blowing sound, rigid, behavioral arrest, unable to speak, hand tremors, drops things from hands, then she cannot remember what happened (amnestic to seizure)  2. One lifetime convulsion in 2012? 3. FIAS - videos from mother 9/10/2021 - she is quiet, random head movements, does not answer questions, but no consistent behaviors other than being quiet. (similar to Seizure type 1)     Woman of childbearing age with Epilepsy:  Contraception: not sexually active  Folic acid supplement: Yes     Prior Epilepsy History:  Intake History 10/22/2019  She was previously seen by Dr. Bianca Awad at [facility]  From Dr. Gladys Rascon office note:  Seizure type 1 - behavioral arrest, tremoulous, unresponsive, amnestic to seizure, ?masticatory movements, blowing air type of sounds  Seizure type 2 - generalized tonic clonic seizure out of sleep  Patient is from the [facility]. She was diagnosed with seizures at an early age in childhood; initially there were staring episodes; mother was unable to report name of medications that were tried. She had generalized convulsions starting at the age of 25. She was initially on Tegretol. Her last visit with Dr. Bianca Awad was on 6/27/2019. She continued to have "a few attacks". After a seizure she will have a headache. Patient's history: obtained using  by telephone  Cecil Antunez is here with her sister. I started to have Perlita give her own history regarding seizures but she was struggling with the telephone  (not sure how to respond to questions). She looked to her sister for information. Her sister reports that Cecil Antunez started to have seizures about 11 years ago (age 12-13, not during early childhood). Cecil Antunez has no recollection of when she has a seizure. She did not have seizures as a baby.  When she was a child she was "slow" ( used "hidden condition"). When she was born, there was a problem during birth, the doctor was pushing or pulling during the delivery and there was something in the brain that got "disconnected". Her sister reports that Perlita seizures involve Perlita appearing paralyzed, unable to speak, with hand tremors, rigid and stiff, and forgets what she is doing. If she has something in her hands she just drops it. This seizure can be about a minute and may repeat 3 times within 5 minutes. Garry Ballesteros has a warning of making a blowing sound with her mouth. Her last seizure was last night. She has seizures about a couple of times a week. There was one seizure that caused her to go into a convulsion (this happened 7 years ago). She started a new job last week, she could not work because she had a seizure there. She could not hold a job because she had seizures at work. Summary 2020  -800, PB 90 qHS. When Perlita has a seizure she is standing, appearing like she zoned out. The family does not keep track of the seizure frequency. Prior to the last visit with me, she would have about 8 seizures a month. Caroline thinks that Perlita may have been on carbamazepine longer than phenobarbital. We added lamotrigine and started to reduce her dose of carbamazepine. Caroline, her sister, accompanies Garry Ballesteros to her office visits (as assist with Gudelia). She continued to have seizures. These seizures are still consistent with the Type 1, behavioral arrest and unable to speak. Summary 2021  CBZ--400, PB 90, -150. She comes with her sister Sheila Aleja). Garry Ballesteros does not know when she is having a seizure. She lives with her mother and a sister Britton Velasquez). Seizures consist of altered awareness and amnesia, she is confused. It is difficult to determine the frequency of her seizures (she is not always watched). She has difficulty remembering things (she gets confused).  We weaned off of carbamazepine and increased lamotrigine. There continues to be at least 3 seizures per month. Her mother shows me videos of Perlita being quiet, but not having similar repetitive movements, she is quiet looking around, her mother will call her name and ask her what is she doing or where is she but Ruth Ann Del Rosario is confused and does not answer. She has these episodes at work and her boss would notice that she is inattentive/confused and dismissed her from work. She completed EMU study in October 2021, found to have bilateral temporal involvement, left epileptiform discharges more frequent than right. However, a few of the clinical seizures were obscured by blanket, happening in the bathroom, or when EEG electrodes were being fixed. Summary 2022  PB 97.2, -250. After her EMU admission, she was supposed to be on lamotrigine 300mg twice a day; but she only has been on -250 and phenobarbital 97.2 at bedtime. She is unaware of when she has a seizure. She at least has a seizure 1-3 times a month as reported by her family. There have been no convulsion; she has periods of confusion and altered awareness. We started to wean her off of phenobarbital to transitions her to zonisamide and higher dose of lamotrigine. By May 2022, there is about one seizure a month. We tried to have her speak to the VNS representative to discuss VNS therapy. Ruth Ann Del Rosario is not interested in surgical intervention. She does not understand English, so she cannot complete neuropsychological testing. Interval History 1/20/2023  She is here with Montrell Orantes, her brother in law,  to [facility]. She brings a seizure calendar, she had seizures on 10/14/2023, 12/3/2023 x 3, 12/19/2023 x 2 (confused), 12/31/2022. On 12/31, seizure was witnessed by her father. The other seizures were witnessed by her sister, Fredric Gowers. Seizures are confusion no convulsion. She has not noticed much in the change of medication side effects.  She endorses that she feels sleepy a lot. She is not certain if her medications are impairing her balance or walking. If asked in open generic terms she does not report any symptoms she reports more fatigue. She may have a little a tremor when she has a seizure but not in general.   She continues to not want to pursue any surgical intervention for her epilepsy. She does not want the VNS therapy. She has a little bit of sadness from her epilepsy. She is unable to ready any basic form of English, she did not recognize "We", "American", "do you read". She only recognizes colors, but she is unable to say "black" or "white". Last time she wrote anything in English was when she was in school in the . Interval History 4/21/2023  She is here with her sister Jian Mckeon. At timing of last visit, zonisamide was increased and plan was to wean off phenobarbital. She initially did not follow the weaning instructions and stopped taking her medication abruptly. When this happened, her sister says there was about a week of Perlita having some episodes of confusion. They contacted our office and were advised to go back on the lower dose of phenobarbital (she was supposed to go from 32.4mg at bedtime to 16.2mg at bedtime, but abruptly stopped the 32.4mg without lowering the dose). She then took 16.2mg at bedtime x 30 days, and then discontinued completely. There were no issues when discontinuing the lower dose. Her sister says they have not noticed any seizures since discontinuing the phenobarbital.  She seems to be tolerating the zonisamide well. She has lost a little weight, but appetite still seems fine, per her sister. She had labs drawn 2 days ago, but the zonisamide level was not ordered. She continues to not want to pursue any surgical intervention for her epilepsy. She does not want the VNS therapy.      Special Features  Status epilepticus: No  Self Injury Seizures: No  Precipitating Factors: None  Post-ictal state: amnestic, confusion     Epilepsy Risk Factors:  Abnormal pregnancy: No  Abnormal birth/: No  Abnormal Development: No  Febrile seizures, simple: No  Febrile seizures, complex: No  CNS infection: No  Mental retardation: No; she was told that she had a learning disability when she was in school; she needed tutors and additional help. Cerebral palsy: No  Head injury (moderate/severe): No  CNS neoplasm: No  CNS malformation: No  Neurosurgical procedure: No  Stroke: No  Alcohol abuse: No  Drug abuse: No  Family history Sz/epilepsy: No     Prior AEDs:  medication Reason to stop   Levetiracetam ?behavioral problem   phenobarbital     carbamazepine     oxcarbazepine     lamotrigine     zonisamide        Prior workup:  x  [id number]  MRI brain - [facility]  Normal MR of brain      2019  MRI brain w/wo  Normal MRI brain study - No pathology was identified in the left frontal or temporal regions. 2021 - PET/CT study  Left temporal hypometabolism; also reduced on the right temporal region     EEGs:  2015 [facility].  Multifocal independent spike-slow waves with phase reversal over the left temporal region T3, T5, Fp1, Fp2, F3 along with generalized spike-slow waves at 1 Hz runs. There are small sharp waves in the bilateral frontal region     -2016 - [facility]  48 hours ambulatory EEG  Generalized spike and waves and polyspike and waves  There is an electrographic seizure but no symptoms were reported  2016 - 11:17 - generalized bifrontal spike-wave complexes with generalized 7-8 Hz activity     3/5/2020  Poorly organized theta/delta slowing and sharply contoured waveforms on the left temporal area  There seems to bifrontal triphasic waves; but on my interpretation of the EEG there are left frontotemporal spike-slow waves. -2020 - 48 hours ambulatory EEG  Frequent left temporal delta/theta activity with left midtemporal sharp waves.   Intermittent delta activity, sharp waves over the left frontal anterior temporal region. 10/20/2021 - EMU admission  Summary interictal findings:   - left temporal/frontotemporal sharp waves (more frequent during sleep)  - left temporal focal slowing  - right temporal delta activity and suspicious very low voltage spikes (starting on day 4 of admission)  - occasional bitemporal independent irregular delta activity  - slow PDR     Summary event findings:   - She had one seizure captured fully - FIAS left temporal onset, but not well visualized  - She had one seizure the bathroom (suspect that she was confused and taken off some EEG electrodes)- similar left temporal onset, irregular delta activity, but not progressing to rhythmic discharges  - She had one FIAS while EEG leads were being fixed (cannot lateralize)  - She had one seizure during sleep (covered by blanket)- initial rhythmic spikes were over the right temporal region, stopped followed by recurrent rhythmic beta activity over the right hemisphere  - One seizure from sleep with suspected clonic activity (covered by blanket) - (bilateral involvement) there is rhythmic sharp alpha activity over T4, extremely low voltage gamma-beta activity over T3, then there is rhythmic delta activity over the left temporal region    Seizure Classification: focal impaired awareness seizure  Epilepsy Classification: intractable focal epilepsy         The following portions of the patient's history were reviewed and updated as appropriate: current medications, past family history, past medical history, past social history, past surgical history and problem list.         Objective:    Blood pressure 126/84, pulse (!) 106, temperature 97.9 °F (36.6 °C), temperature source Temporal, resp. rate 18, height 5' 2" (1.575 m), weight 78.9 kg (174 lb), SpO2 97 %. Physical Exam  Constitutional:       Appearance: Normal appearance. HENT:      Head: Normocephalic and atraumatic.    Eyes:      Extraocular Movements: EOM normal.      Pupils: Pupils are equal, round, and reactive to light. Neurological:      Mental Status: She is alert. Motor: Motor strength is normal.     Deep Tendon Reflexes: Reflexes are normal and symmetric. Psychiatric:         Mood and Affect: Mood normal.         Speech: Speech normal.         Behavior: Behavior normal.         Neurological Exam  Mental Status  Alert. Oriented to person, place, time and situation. Speech is normal. Language is fluent with no aphasia. Attention and concentration are normal.    Cranial Nerves  CN II: Visual fields full to confrontation. CN III, IV, VI: Extraocular movements intact bilaterally. Pupils equal round and reactive to light bilaterally. CN V: Facial sensation is normal.  CN VII: Full and symmetric facial movement. CN VIII: Hearing is normal.  CN IX, X: Palate elevates symmetrically  CN XI: Shoulder shrug strength is normal.  CN XII: Tongue midline without atrophy or fasciculations. Motor   Normal muscle tone. Strength is 5/5 throughout all four extremities. Sensory  Light touch is normal in upper and lower extremities. Reflexes  Deep tendon reflexes are 2+ and symmetric in all four extremities. Coordination  Right: Finger-to-nose normal.Left: Finger-to-nose normal.    Gait  Casual gait is normal including stance, stride, and arm swing. Current Outpatient Medications   Medication Sig Dispense Refill   • ergocalciferol (VITAMIN D2) 50,000 units TAKE 1 CAPSULE BY MOUTH 1 TIME A WEEK 8 capsule 0   • folic acid (FOLVITE) 1 mg tablet Take 1 tablet (1 mg total) by mouth in the morning.  30 tablet 11   • lamoTRIgine (LaMICtal) 200 MG tablet Take 1 tablet (200 mg total) by mouth 2 (two) times a day With two 25mg tabs 90 tablet 5   • lamoTRIgine (LaMICtal) 25 mg tablet Take 2 tablets (50 mg total) by mouth 2 (two) times a day With one 200mg tab 120 tablet 5   • Vitamin D, Cholecalciferol, 50 MCG (2000 UT) CAPS Take 1 capsule by mouth daily 30 capsule 11   • zonisamide (Zonegran) 100 mg capsule Take three capsules by mouth twice a day 180 capsule 5     No current facility-administered medications for this visit. No Known Allergies     Past Medical History:   Diagnosis Date   • Seizures ([address])       History reviewed. No pertinent surgical history. Family History   Problem Relation Age of Onset   • Hypertension Maternal Grandmother    • Colon polyps Mother    • Glaucoma Mother    • Asthma Sister       Past Psychiatric History:  Depression: Yes  Anxiety: No  Psychosis: No     Social History  Living situation:  Lives with family  Work:  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, not currently working, last attempt at a job was a year ago was at [employer] as , she was dismissed because she had a seizure at work  Driving:  No   reports that she has never smoked. She has never used smokeless tobacco. She reports that she does not drink alcohol and does not use drugs. ROS:    Review of Systems   Constitutional: Negative for chills and fever. HENT: Negative for ear pain and sore throat. Eyes: Positive for itching. Negative for pain and visual disturbance. Respiratory: Negative for cough and shortness of breath. Cardiovascular: Positive for chest pain. Negative for palpitations. Gastrointestinal: Positive for diarrhea. Negative for abdominal pain and vomiting. Genitourinary: Negative for dysuria and hematuria. Musculoskeletal: Positive for back pain and neck stiffness. Negative for arthralgias. Skin: Negative for color change and rash. Neurological: Positive for dizziness, seizures (7/27/23), light-headedness and headaches (1 in the last week). Negative for syncope. All other systems reviewed and are negative.     I personally reviewed and updated the ROS as appropriate

## 2023-07-31 LAB — ZONISAMIDE SERPL-MCNC: 17.2 UG/ML (ref 10–40)

## 2023-08-18 DIAGNOSIS — E55.9 VITAMIN D DEFICIENCY: ICD-10-CM

## 2023-08-18 RX ORDER — ERGOCALCIFEROL 1.25 MG/1
CAPSULE ORAL
Qty: 8 CAPSULE | Refills: 0 | OUTPATIENT
Start: 2023-08-18

## 2023-09-10 DIAGNOSIS — G40.919 INTRACTABLE EPILEPSY WITHOUT STATUS EPILEPTICUS, UNSPECIFIED EPILEPSY TYPE (HCC): ICD-10-CM

## 2023-09-11 RX ORDER — LAMOTRIGINE 25 MG/1
TABLET ORAL
Qty: 120 TABLET | Refills: 5 | Status: SHIPPED | OUTPATIENT
Start: 2023-09-11

## 2023-09-16 DIAGNOSIS — G40.919 INTRACTABLE EPILEPSY WITHOUT STATUS EPILEPTICUS, UNSPECIFIED EPILEPSY TYPE (HCC): ICD-10-CM

## 2023-09-18 RX ORDER — ACETAMINOPHEN 160 MG
2000 TABLET,DISINTEGRATING ORAL DAILY
Qty: 30 CAPSULE | Refills: 1 | Status: SHIPPED | OUTPATIENT
Start: 2023-09-18

## 2023-10-27 ENCOUNTER — OFFICE VISIT (OUTPATIENT)
Dept: NEUROLOGY | Facility: CLINIC | Age: 33
End: 2023-10-27
Payer: MEDICARE

## 2023-10-27 VITALS
HEIGHT: 62 IN | TEMPERATURE: 97.2 F | OXYGEN SATURATION: 98 % | DIASTOLIC BLOOD PRESSURE: 78 MMHG | SYSTOLIC BLOOD PRESSURE: 118 MMHG | WEIGHT: 177 LBS | BODY MASS INDEX: 32.57 KG/M2 | HEART RATE: 76 BPM

## 2023-10-27 DIAGNOSIS — E55.9 VITAMIN D DEFICIENCY: ICD-10-CM

## 2023-10-27 DIAGNOSIS — G40.919 INTRACTABLE EPILEPSY WITHOUT STATUS EPILEPTICUS, UNSPECIFIED EPILEPSY TYPE (HCC): Primary | ICD-10-CM

## 2023-10-27 PROBLEM — E66.811 CLASS 1 OBESITY DUE TO EXCESS CALORIES WITHOUT SERIOUS COMORBIDITY WITH BODY MASS INDEX (BMI) OF 32.0 TO 32.9 IN ADULT: Status: ACTIVE | Noted: 2021-07-14

## 2023-10-27 PROBLEM — E66.09 CLASS 1 OBESITY DUE TO EXCESS CALORIES WITHOUT SERIOUS COMORBIDITY WITH BODY MASS INDEX (BMI) OF 32.0 TO 32.9 IN ADULT: Status: ACTIVE | Noted: 2021-07-14

## 2023-10-27 PROCEDURE — 99215 OFFICE O/P EST HI 40 MIN: CPT | Performed by: PSYCHIATRY & NEUROLOGY

## 2023-10-27 RX ORDER — LAMOTRIGINE 100 MG/1
100 TABLET ORAL 2 TIMES DAILY
Qty: 60 TABLET | Refills: 5 | Status: SHIPPED | OUTPATIENT
Start: 2023-10-27

## 2023-10-27 RX ORDER — ZONISAMIDE 100 MG/1
CAPSULE ORAL
Qty: 180 CAPSULE | Refills: 5 | Status: SHIPPED | OUTPATIENT
Start: 2023-10-27

## 2023-10-27 RX ORDER — FOLIC ACID 1 MG/1
1 TABLET ORAL DAILY
Qty: 30 TABLET | Refills: 11 | Status: SHIPPED | OUTPATIENT
Start: 2023-10-27

## 2023-10-27 RX ORDER — LAMOTRIGINE 200 MG/1
200 TABLET ORAL 2 TIMES DAILY
Qty: 60 TABLET | Refills: 5 | Status: SHIPPED | OUTPATIENT
Start: 2023-10-27

## 2023-10-27 RX ORDER — ACETAMINOPHEN 160 MG
2000 TABLET,DISINTEGRATING ORAL DAILY
Qty: 30 CAPSULE | Refills: 1 | Status: SHIPPED | OUTPATIENT
Start: 2023-10-27

## 2023-10-27 RX ORDER — CETIRIZINE HYDROCHLORIDE 10 MG/1
TABLET ORAL
COMMUNITY
Start: 2023-10-16

## 2023-10-27 NOTE — PROGRESS NOTES
Review of Systems   Constitutional:  Negative for appetite change, fatigue and fever. HENT: Negative. Negative for hearing loss, tinnitus, trouble swallowing and voice change. Eyes: Negative. Negative for photophobia, pain and visual disturbance. Respiratory: Negative. Negative for shortness of breath. Cardiovascular: Negative. Negative for palpitations. Gastrointestinal: Negative. Negative for nausea and vomiting. Endocrine: Negative. Negative for cold intolerance. Genitourinary: Negative. Negative for dysuria, frequency and urgency. Musculoskeletal:  Positive for back pain and neck pain. Negative for gait problem and myalgias. Skin: Negative. Negative for rash. Allergic/Immunologic: Negative. Neurological:  Positive for dizziness, seizures (9/20/23 2hours) and headaches. Negative for tremors, syncope, facial asymmetry, speech difficulty, weakness, light-headedness and numbness. Hematological: Negative. Does not bruise/bleed easily. Psychiatric/Behavioral: Negative. Negative for confusion, hallucinations and sleep disturbance. All other systems reviewed and are negative.

## 2023-10-27 NOTE — PROGRESS NOTES
[facility]  Patient's Name: Veronica Harris   Patient's : 1990   Visit Type: follow-up  Referring MD / PCP:  Giorgio Poole MD    Assessment:  Ms. Veronica Harris is a 29 y.o. woman with intractable focal epilepsy and intellectual disability. Her seizures are suspected to be temporal in origin but she may have bilateral involvement. Her EEG studies suggest predominantly left hemispheric/temporal seizure onset but there is a high probability of right temporal early involvement. MRI brain study is normal; however, PET/CT brain study also indicates left more the right decreased metabolism. Assessing seizure frequency is difficult, as she is generally amnestic to her seizures. Despite transition to lamotrigine and zonisamide, she continues to have episodic seizures, unclear if there is an improvement in seizure reduction. Maybe there has been some decrease to less than once a month. I am not sure if she was in a seizure for a couple of hours or if she was post-ictal.  We will try to maximize her dose of lamotrigine before adding on a third antiseizure medication. Due to the presence of bilateral, likely mesial temporal seizure onset, she is not a candidate for direct resective surgery. She is a candidate for neuromodulatory devices such as VNS, DBS for epilepsy and possibly RNS. She has declined any surgical intervention, including the VNS therapy. I reviewed the risk of SUDEP with her and her sister. These devices may not necessarily cure an individual of seizures but do help reduce her risk of SUDEP. Due to her seizures being unpredictable, she cannot recognize when she has a seizure, and the recent confusional episode for 2 hours, I don't think it is safe for her to be a  for her niece who is only a couple of years hold.       Plan:   1 - continue with zonisamide 100mg cap take three caps twice a day  2 - increase lamotrigine to 275mg twice a day (take one 200mg tab and three 25mg tab twice a day) until no more 25mg tabs, then the next refill is for 200mg and 100mg tabs, take one each twice a day (300mg twice a day). Call the office if you have side effects of worsening dizziness, shakiness, tremors, or difficulty walk/balance  3 - check lamotrigine, zonisamide, CBC, and CMP in 1 month  4 - please bring your medications to the office visit  5 - please continue to monitor the frequency of your seizures with a seizure calendar; bring your seizure calendar to the office  6 - please continue with folic acid 1mg daily  7 - continue with vitamin D 2000 units once a day  8 - follow-up in 3 months with Advanced Practitioner    If you were to have more seizures will need to add another antiseizure medication, such as Fycompa (perampanel). I reviewed side effects of Fycompa, which include, but not limited to, mood swings, irritability, suicidal ideation, medication interactions, ataxia, incoordination, cognitive impairment, insomnia and depression and homicidal ideation. Candice [facility])    During today's office visit we reviewed sudden unexpected (unexplained) death in epilepsy (SUDEP). We discussed that patients with epilepsy have a higher risk of mortality compared to persons without epilepsy. Currently, little information is available as to what are the factors that determine the risk of SUDEP. SUDEP occurs in 1-2 per 1,000 person-years (1-2 patients die from SUDEP out of 1000 persons with epilepsy per year). This diagnosis consists of an unexpected death in a person with epilepsy, not due to trauma, accident, medication adverse reaction, overdose, or clearly defined medication condition (such as stroke or heart attack).   Risk factors that places patients at significant risk of SUDEP include medication nonadherence or noncompliance, poorly controlled seizures, more than one generalized convulsion a month, polypharmacy, male gender, and living alone. Currently, the only means of reducing the risk of SUDEP is to follow instructions as to medication adherence, regular follow-up with your physician to get seizures under control including epilepsy surgery, have a seizure safety plan and first aid immediately available. First aid consist of recognition when a seizure has happened or going to happen, prevention of suffocation, turning the patient on her side, monitoring for breathing obstructions, and performing basic first aid if necessary. Recommend that the family have formal training in basic life support. [email or web address]    Problem List Items Addressed This Visit          Nervous and Auditory    Intractable epilepsy without status epilepticus ([address]) - Primary    Relevant Medications    lamoTRIgine (LaMICtal) 200 MG tablet    zonisamide (Zonegran) 100 mg capsule    lamoTRIgine (LaMICtal) 129 mg tablet    folic acid (FOLVITE) 1 mg tablet    Other Relevant Orders    EEG awake or drowsy routine    Ambulatory EEG 72 Hours    Lamotrigine level    Zonisamide level    Comprehensive metabolic panel    CBC       Other    Vitamin D deficiency    Relevant Medications    Cholecalciferol (Vitamin D3) 50 MCG (2000 UT) capsule         Chief Complaint:   Chief Complaint   Patient presents with    Seizures      HPI:    Perilta Bloom Liam Hallman is a 29 y.o. right handed female here for follow-up evaluation of intractable epilepsy. Interval History 10/27/2023  She was last seen by Michael Crews on 7/28/2023 - she continued to have recurrent seizures, she did not bring her seizure calendar. Her seizure continued to be lapses in awareness. Her zonisamide was increased to 300-300. She had weaned off of phenobarbital.    She is here with Sindy Puentes, who is her sister who comes to the office frequently with Perlita. Since her last visit, Sindy Puentes can only remember 2 seizures that Perlita had (on 9/20 and 10/25).   On 9/20, she had a seizure for 2 hours. Laney Salgado helps take care of Claudia's son who is 3years old. When Ulises got home from work, she noticed that Laney Salgado is not her self (there Is no longer the blowing sound), looking out of it, she was not able to talk, wrong answers, and didn't realize that Claudia's son had a cast for 2 week. [place] kept asking Laney Salgado questions and kept getting inappropriate answers. The confusion lasted about an hour. On 10/25, Perlita's mother reported to [place] that Perlita not acting her self, looking at her hands, confused. AED/side effects/compliance:  Lamotrigine 250-250  Zonisamide 300-300    Event/Seizure semiology:  She starts to make a blowing sound, rigid, behavioral arrest, unable to speak, hand tremors, drops things from hands, then she cannot remember what happened (amnestic to seizure)  One lifetime convulsion in 2012? FIAS - videos from mother 9/10/2021 - she is quiet, random head movements, does not answer questions, but no consistent behaviors other than being quiet. (similar to Seizure type 1)    Woman of childbearing age with Epilepsy:  Contraception: not sexually active  Folic acid supplement:  Yes    Prior Epilepsy History:  Intake History 10/22/2019  She was previously seen by Dr. Gavin Mccauley at [facility]  From Dr. Héctor Small office note:  Seizure type 1 - behavioral arrest, tremoulous, unresponsive, amnestic to seizure, ?masticatory movements, blowing air type of sounds  Seizure type 2 - generalized tonic clonic seizure out of sleep  Patient is from the [facility]. She was diagnosed with seizures at an early age in childhood; initially there were staring episodes; mother was unable to report name of medications that were tried. She had generalized convulsions starting at the age of 25. She was initially on Tegretol. Her last visit with Dr. Gavin Mccauley was on 6/27/2019. She continued to have "a few attacks". After a seizure she will have a headache.     Patient's history: obtained using  by telephone  Mckenna Ward is here with her sister. I started to have Perlita give her own history regarding seizures but she was struggling with the telephone  (not sure how to respond to questions). She looked to her sister for information. Her sister reports that Mckenna Ward started to have seizures about 11 years ago (age 12-13, not during early childhood). Mckenna Ward has no recollection of when she has a seizure. She did not have seizures as a baby. When she was a child she was "slow" ( used "hidden condition"). When she was born, there was a problem during birth, the doctor was pushing or pulling during the delivery and there was something in the brain that got "disconnected". Her sister reports that Perlita seizures involve Perlita appearing paralyzed, unable to speak, with hand tremors, rigid and stiff, and forgets what she is doing. If she has something in her hands she just drops it. This seizure can be about a minute and may repeat 3 times within 5 minutes. Mckenna Ward has a warning of making a blowing sound with her mouth. Her last seizure was last night. She has seizures about a couple of times a week. There was one seizure that caused her to go into a convulsion (this happened 7 years ago). She started a new job last week, she could not work because she had a seizure there. She could not hold a job because she had seizures at work. Summary 2020  -800, PB 90 qHS. When Perlita has a seizure she is standing, appearing like she zoned out. The family does not keep track of the seizure frequency. Prior to the last visit with me, she would have about 8 seizures a month. Caroline thinks that Perlita may have been on carbamazepine longer than phenobarbital.  We added lamotrigine and started to reduce her dose of carbamazepine. Caroline, her sister, accompanies Mckenna Ward to her office visits (as assist with Gudelia). She continued to have seizures.   These seizures are still consistent with the Type 1, behavioral arrest and unable to speak. Summary 2021  CBZ--400, PB 90, -150. She comes with her sister Cristina Rogel). Dalila Kerr does not know when she is having a seizure. She lives with her mother and a sister John Zamora. Seizures consist of altered awareness and amnesia, she is confused. It is difficult to determine the frequency of her seizures (she is not always watched). She has difficulty remembering things (she gets confused). We weaned off of carbamazepine and increased lamotrigine. There continues to be at least 3 seizures per month. Her mother shows me videos of Perlita being quiet, but not having similar repetitive movements, she is quiet looking around, her mother will call her name and ask her what is she doing or where is she but Dalila Kerr is confused and does not answer. She has these episodes at work and her boss would notice that she is inattentive/confused and dismissed her from work. She completed EMU study in October 2021, found to have bilateral temporal involvement, left epileptiform discharges more frequent than right. However, a few of the clinical seizures were obscured by blanket, happening in the bathroom, or when EEG electrodes were being fixed. Summary 2022  PB 97.2, -250. After her EMU admission, she was supposed to be on lamotrigine 300mg twice a day; but she only has been on -250 and phenobarbital 97.2 at bedtime. She is unaware of when she has a seizure. She at least has a seizure 1-3 times a month as reported by her family. There have been no convulsion; she has periods of confusion and altered awareness. We started to wean her off of phenobarbital to transitions her to zonisamide and higher dose of lamotrigine. By May 2022, there is about one seizure a month. We tried to have her speak to the VNS representative to discuss VNS therapy. Dalila Kerr is not interested in surgical intervention.   She does not understand English, so she cannot complete neuropsychological testing. Interval History 2023  PB 32.4, -250, -200. She is here with Lianet Torres, her brother in law,  to [place]. She brings a seizure calendar, she had seizures on 10/14/2023, 12/3/2023 x 3, 12/19/2023 x 2 (confused), 2022. There are no convulsive seizures; she appears confused. She continues to not want to pursue any surgical intervention for her epilepsy. She does not want the VNS therapy. She is unable to ready any basic form of English, she did not recognize "We", "American", "do you read". She only recognizes colors, but she is unable to say "black" or "white". Last time she wrote anything in English was when she was in [place] in [place] Special Features  Status epilepticus: No  Self Injury Seizures: No  Precipitating Factors: None  Post-ictal state: amnestic, confusion    Epilepsy Risk Factors:  Abnormal pregnancy: No  Abnormal birth/: No  Abnormal Development: No  Febrile seizures, simple: No  Febrile seizures, complex: No  CNS infection: No  Mental retardation: No; she was told that she had a learning disability when she was in school; she needed tutors and additional help. Cerebral palsy: No  Head injury (moderate/severe): No  CNS neoplasm: No  CNS malformation: No  Neurosurgical procedure: No  Stroke: No  Alcohol abuse: No  Drug abuse: No  Family history Sz/epilepsy: No    Prior AEDs:  medication Reason to stop   Levetiracetam ?behavioral problem   phenobarbital Continued to have seizures   carbamazepine    oxcarbazepine    lamotrigine    zonisamide      Prior workup:  x  Imagin2016  MRI brain - [facility]  Normal MR of brain     2019  MRI brain w/wo  Normal MRI brain study -  No pathology was identified in the left frontal or temporal regions.     2021 - PET/CT study  Left temporal hypometabolism; also reduced on the right temporal region    EEGs:  2015 - [facility]  Multifocal independent spike-slow waves with phase reversal over the left temporal region T3, T5, Fp1, Fp2, F3 along with generalized spike-slow waves at 1 Hz runs. There are small sharp waves in the bilateral frontal region    7/11-7/13/2016 - [facility]  48 hours ambulatory EEG  Generalized spike and waves and polyspike and waves  There is an electrographic seizure but no symptoms were reported  7/11/2016 - 11:17 - generalized bifrontal spike-wave complexes with generalized 7-8 Hz activity    3/5/2020  Poorly organized theta/delta slowing and sharply contoured waveforms on the left temporal area  There seems to bifrontal triphasic waves; but on my interpretation of the EEG there are left frontotemporal spike-slow waves. 9/9-9/11/2020 - 48 hours ambulatory EEG  Frequent left temporal delta/theta activity with left midtemporal sharp waves. Intermittent delta activity, sharp waves over the left frontal anterior temporal region.     10/20/2021 - EMU admission  Summary interictal findings:   - left temporal/frontotemporal sharp waves (more frequent during sleep)  - left temporal focal slowing  - right temporal delta activity and suspicious very low voltage spikes (starting on day 4 of admission)  - occasional bitemporal independent irregular delta activity  - slow PDR     Summary event findings:   - She had one seizure captured fully - FIAS left temporal onset, but not well visualized  - She had one seizure the bathroom (suspect that she was confused and taken off some EEG electrodes)- similar left temporal onset, irregular delta activity, but not progressing to rhythmic discharges  - She had one FIAS while EEG leads were being fixed (cannot lateralize)  - She had one seizure during sleep (covered by blanket)- initial rhythmic spikes were over the right temporal region, stopped followed by recurrent rhythmic beta activity over the right hemisphere  - One seizure from sleep with suspected clonic activity (covered by blanket) - (bilateral involvement) there is rhythmic sharp alpha activity over T4, extremely low voltage gamma-beta activity over T3, then there is rhythmic delta activity over the left temporal region    Seizure Classification: focal impaired awareness seizure  Epilepsy Classification: intractable focal epilepsy    She is not going to be able to complete a neuropsychology evaluation due to [place] [place] [place] as her only language.     Labs:  Component      Latest Ref Rng 4/19/2023 7/27/2023   Sodium      135 - 147 mmol/L 138     Potassium      3.5 - 5.3 mmol/L 4.0     Chloride      96 - 108 mmol/L 109 (H)     CO2      21 - 32 mmol/L 26     Anion Gap      4 - 13 mmol/L 3 (L)     BUN      5 - 25 mg/dL 10     Creatinine      0.60 - 1.30 mg/dL 0.74     Glucose, Random      65 - 140 mg/dL 89     Calcium      8.3 - 10.1 mg/dL 9.3     AST      5 - 45 U/L 17     ALT      12 - 78 U/L 27     Alkaline Phosphatase      46 - 116 U/L 131 (H)     Total Protein      6.4 - 8.4 g/dL 7.8     Albumin      3.5 - 5.0 g/dL 3.9     TOTAL BILIRUBIN      0.20 - 1.00 mg/dL 0.16 (L)     eGFR      ml/min/1.73sq m 109     WBC      4.31 - 10.16 Thousand/uL 5.84     Red Blood Cell Count      3.81 - 5.12 Million/uL 4.69     Hemoglobin      11.5 - 15.4 g/dL 14.8     HCT      34.8 - 46.1 % 44.4     Platelet Count      674 - 390 Thousands/uL 372     MPV      8.9 - 12.7 fL 10.0     LAMOTRIGINE LEVEL      2.0 - 20.0 ug/mL 12.3     Vit D, 25-Hydroxy      30.0 - 100.0 ng/mL 15.0 (L)     ZONISAMIDE LEVEL      10.0 - 40.0 ug/mL  17.2         General exam   /78   Pulse 76   Temp (!) 97.2 °F (36.2 °C) (Temporal)   Ht 5' 2" (1.575 m)   Wt 80.3 kg (177 lb)   SpO2 98%   BMI 32.37 kg/m²    Appearance: normally developed, appears well  Carotids: not assessed  Cardiovascular: regular rate and rhythm and normal heart sounds  Pulmonary: clear to auscultation  Abdominal: obese, nontender    HEENT: anicteric   Fundoscopy: not assessed    Mental status  Orientation: alert and oriented to name, October 27, 2022, Friday, corrected to 2023, MICHELL, [address] of Knowledge: limited   Attention and Concentration: intact  Current and Remote Memory:intact  Language: spontaneous speech is normal and comprehension is intact in Hebrew as interpreted by her sister    Cranial Nerves  CN 1: not tested  CN 2: pupils equal round reactive to direct and consenual light   CN 3, 4, 6: EOMI, no nystagmus  CN 5:not assessed  CN 7:muscles of facial expression are symmetric  CN 8:not assessed  CN 9, 10:no dysarthria present  CN 11:not assessed  CN 12:tongue is midline    Motor:  Bulk, Tone: normal bulk, normal tone  Pronation: normal barrel roll  Strength: Symmetric strength of the arms and legs, no lateralizing weakness     Sensory:  Lighttouch: intact in all limbs  Romberg:not assessed    Coordination:  FNF:FNF bilaterally intact  PAULINO:intact  FFM:intact  Gait/Station:normal gait and normal tandem gait    Reflexes:  Not assessed    Past Medical/Surgical History:  Patient Active Problem List   Diagnosis    Body mass index (BMI) of 37.0 to 37.9 in adult    Intellectual disability    Chronic neck pain    Intractable epilepsy without status epilepticus ([address])    Anticonvulsant drug-induced osteomalacia    Vitamin D deficiency    Class 1 obesity due to excess calories without serious comorbidity with body mass index (BMI) of 32.0 to 32.9 in adult     History reviewed. No pertinent surgical history.     Past Psychiatric History:  Depression: Yes  Anxiety: No  Psychosis: No    Medications:    Current Outpatient Medications:     cetirizine (ZyrTEC) 10 mg tablet, , Disp: , Rfl:     Cholecalciferol (Vitamin D3) 50 MCG (2000 UT) capsule, Take 1 capsule (2,000 Units total) by mouth daily, Disp: 30 capsule, Rfl: 1    folic acid (FOLVITE) 1 mg tablet, Take 1 tablet (1 mg total) by mouth daily, Disp: 30 tablet, Rfl: 11    lamoTRIgine (LaMICtal) 100 mg tablet, Take 1 tablet (100 mg total) by mouth 2 (two) times a day With one 200mg tab, Disp: 60 tablet, Rfl: 5    lamoTRIgine (LaMICtal) 200 MG tablet, Take 1 tablet (200 mg total) by mouth 2 (two) times a day With one 100mg tab, Disp: 60 tablet, Rfl: 5    zonisamide (Zonegran) 100 mg capsule, Take three capsules by mouth twice a day, Disp: 180 capsule, Rfl: 5    Allergies:  No Known Allergies    Family history:  Family History   Problem Relation Age of Onset    Hypertension Maternal Grandmother     Colon polyps Mother     Glaucoma Mother     Asthma Sister      There is no family history of seizure, epilepsy or developmental delay. Social History  Living situation:  Lives with family  Work:  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, not currently working due to seizures  Driving:  No   reports that she has never smoked. She has never used smokeless tobacco. She reports that she does not drink alcohol and does not use drugs. Review of Systems  A review of at least 12 organ/systems was obtained by the medical assistant and reviewed by me, including additional positives/negatives:  Musculoskeletal:  Positive for back pain and neck pain. Negative for gait problem and myalgias. Neurological:  Positive for dizziness, seizures (9/20/23 2hours) and headaches. Decision making was of high-complexity due to the patient's high risk condition (seizures), psychiatric and neuropsychological comorbidities, behavioral problems, memory and cognitive problems and medication side effects. The total amount of time spent with the patient along with pre-chart and post-chart preparation was 43 minutes on the calendar day of the date of service. This included history taking, physical exam, review of ancillary testing, counseling provided to the patient regarding diagnosis, medications, treatment, and risk management, and other communication to the patient's providers and/or family.   Start time: 10:47AM  End time: 11:30AM

## 2023-10-27 NOTE — PATIENT INSTRUCTIONS
Plan:   1 - continue with zonisamide 100mg cap take three caps twice a day  2 - increase lamotrigine to 275mg twice a day (take one 200mg tab and three 25mg tab twice a day) until no more 25mg tabs, then the next refill is for 200mg and 100mg tabs, take one each twice a day (300mg twice a day). Call the office if you have side effects of worsening dizziness, shakiness, tremors, or difficulty walk/balance  3 - check lamotrigine, zonisamide, CBC, and CMP in 1 month  4 - please bring your medications to the office visit  5 - please continue to monitor the frequency of your seizures with a seizure calendar; bring your seizure calendar to the office  6 - please continue with folic acid 1mg daily  7 - continue with vitamin D 2000 units once a day  8 - follow-up in 3 months with Advanced Practitioner    If you were to have more seizures will need to add another antiseizure medication, such as Fycompa (perampanel). I reviewed side effects of Fycompa, which include, but not limited to, mood swings, irritability, suicidal ideation, medication interactions, ataxia, incoordination, cognitive impairment, insomnia and depression and homicidal ideation. .  [facility] CAT[facility]    [email or web address]

## 2023-11-29 ENCOUNTER — HOSPITAL ENCOUNTER (OUTPATIENT)
Dept: NEUROLOGY | Facility: CLINIC | Age: 33
Discharge: HOME | End: 2023-11-29
Payer: MEDICARE

## 2023-11-29 DIAGNOSIS — G40.919 INTRACTABLE EPILEPSY WITHOUT STATUS EPILEPTICUS, UNSPECIFIED EPILEPSY TYPE (HCC): ICD-10-CM

## 2023-11-29 PROCEDURE — 95816 EEG AWAKE AND DROWSY: CPT

## 2023-11-29 PROCEDURE — 95819 EEG AWAKE AND ASLEEP: CPT | Performed by: PSYCHIATRY & NEUROLOGY

## 2023-12-11 ENCOUNTER — HOSPITAL ENCOUNTER (OUTPATIENT)
Dept: NEUROLOGY | Facility: CLINIC | Age: 33
Discharge: HOME | End: 2023-12-11

## 2023-12-11 DIAGNOSIS — G40.919 INTRACTABLE EPILEPSY WITHOUT STATUS EPILEPTICUS, UNSPECIFIED EPILEPSY TYPE (HCC): ICD-10-CM

## 2023-12-12 ENCOUNTER — HOSPITAL ENCOUNTER (OUTPATIENT)
Dept: NEUROLOGY | Facility: CLINIC | Age: 33
Discharge: HOME | End: 2023-12-12
Payer: MEDICARE

## 2023-12-12 DIAGNOSIS — G40.919 INTRACTABLE EPILEPSY WITHOUT STATUS EPILEPTICUS, UNSPECIFIED EPILEPSY TYPE (HCC): ICD-10-CM

## 2023-12-12 PROCEDURE — 95719 EEG PHYS/QHP EA INCR W/O VID: CPT | Performed by: PSYCHIATRY & NEUROLOGY

## 2023-12-12 PROCEDURE — 95708 EEG WO VID EA 12-26HR UNMNTR: CPT

## 2023-12-13 ENCOUNTER — HOSPITAL ENCOUNTER (OUTPATIENT)
Dept: NEUROLOGY | Facility: CLINIC | Age: 33
Discharge: HOME | End: 2023-12-13
Payer: MEDICARE

## 2023-12-13 DIAGNOSIS — G40.919 INTRACTABLE EPILEPSY WITHOUT STATUS EPILEPTICUS, UNSPECIFIED EPILEPSY TYPE (HCC): ICD-10-CM

## 2023-12-13 PROCEDURE — 95708 EEG WO VID EA 12-26HR UNMNTR: CPT

## 2023-12-13 PROCEDURE — 95719 EEG PHYS/QHP EA INCR W/O VID: CPT | Performed by: PSYCHIATRY & NEUROLOGY

## 2023-12-14 ENCOUNTER — HOSPITAL ENCOUNTER (OUTPATIENT)
Dept: NEUROLOGY | Facility: CLINIC | Age: 33
End: 2023-12-14
Payer: MEDICARE

## 2023-12-14 DIAGNOSIS — G40.919 INTRACTABLE EPILEPSY WITHOUT STATUS EPILEPTICUS, UNSPECIFIED EPILEPSY TYPE (HCC): ICD-10-CM

## 2023-12-14 PROCEDURE — 95708 EEG WO VID EA 12-26HR UNMNTR: CPT

## 2023-12-14 PROCEDURE — 95719 EEG PHYS/QHP EA INCR W/O VID: CPT | Performed by: PSYCHIATRY & NEUROLOGY

## 2023-12-18 ENCOUNTER — TELEPHONE (OUTPATIENT)
Dept: NEUROLOGY | Facility: CLINIC | Age: 33
End: 2023-12-18

## 2023-12-18 NOTE — TELEPHONE ENCOUNTER
----- Message from Ankur Ureña MD sent at 12/14/2023  4:38 PM EST -----  No seizures were captured during the 72 hours ambulatory EEG study.  There are left temporal sharps which suggest potential seizure focus in the left temporal region.

## 2024-01-10 ENCOUNTER — TELEPHONE (OUTPATIENT)
Dept: NEUROLOGY | Facility: CLINIC | Age: 34
End: 2024-01-10

## 2024-01-10 NOTE — TELEPHONE ENCOUNTER
LVM Letting patient know in Ukrainian that appointment with Marry Rascon needed to be rescheduled due to provider being out of the office. Provided our call back information.

## 2024-02-09 ENCOUNTER — TELEPHONE (OUTPATIENT)
Dept: NEUROLOGY | Facility: CLINIC | Age: 34
End: 2024-02-09

## 2024-02-09 NOTE — TELEPHONE ENCOUNTER
Sister higinio called to r/s appt 2/9 at 1015 because patient needed to redo insurance and is waiitng on new card. Advised to please call when the card arrives so we can submit a note, she understood. Requested to r/s in March. Offered 3/8 at 10:15 am, accepted. Location address was confirmed.

## 2024-05-02 DIAGNOSIS — E55.9 VITAMIN D DEFICIENCY: ICD-10-CM

## 2024-05-02 DIAGNOSIS — G40.919 INTRACTABLE EPILEPSY WITHOUT STATUS EPILEPTICUS, UNSPECIFIED EPILEPSY TYPE (HCC): ICD-10-CM

## 2024-05-03 ENCOUNTER — PATIENT MESSAGE (OUTPATIENT)
Dept: NEUROLOGY | Facility: CLINIC | Age: 34
End: 2024-05-03

## 2024-05-03 RX ORDER — ACETAMINOPHEN 160 MG
2000 TABLET,DISINTEGRATING ORAL DAILY
Qty: 30 CAPSULE | Refills: 5 | Status: SHIPPED | OUTPATIENT
Start: 2024-05-03

## 2024-05-03 RX ORDER — ZONISAMIDE 100 MG/1
CAPSULE ORAL
Qty: 180 CAPSULE | Refills: 5 | Status: SHIPPED | OUTPATIENT
Start: 2024-05-03

## 2024-05-03 RX ORDER — LAMOTRIGINE 100 MG/1
100 TABLET ORAL 2 TIMES DAILY
Qty: 60 TABLET | Refills: 5 | Status: SHIPPED | OUTPATIENT
Start: 2024-05-03

## 2024-05-03 RX ORDER — LAMOTRIGINE 200 MG/1
200 TABLET ORAL 2 TIMES DAILY
Qty: 60 TABLET | Refills: 5 | Status: SHIPPED | OUTPATIENT
Start: 2024-05-03

## 2024-05-03 RX ORDER — FOLIC ACID 1 MG/1
1 TABLET ORAL DAILY
Qty: 30 TABLET | Refills: 11 | Status: SHIPPED | OUTPATIENT
Start: 2024-05-03

## 2024-05-07 NOTE — PATIENT COMMUNICATION
Received vm from 5/3 at 8:09am-   vm recevied fro refill  [phone]  ---------------------------  already addressed

## 2024-11-17 DIAGNOSIS — G40.919 INTRACTABLE EPILEPSY WITHOUT STATUS EPILEPTICUS, UNSPECIFIED EPILEPSY TYPE (HCC): ICD-10-CM

## 2024-11-19 DIAGNOSIS — G40.919 INTRACTABLE EPILEPSY WITHOUT STATUS EPILEPTICUS, UNSPECIFIED EPILEPSY TYPE (HCC): ICD-10-CM

## 2024-11-19 RX ORDER — LAMOTRIGINE 200 MG/1
200 TABLET ORAL 2 TIMES DAILY
Qty: 60 TABLET | Refills: 5 | Status: SHIPPED | OUTPATIENT
Start: 2024-11-19

## 2024-11-19 RX ORDER — FOLIC ACID 1 MG/1
1 TABLET ORAL DAILY
Qty: 30 TABLET | Refills: 0 | Status: SHIPPED | OUTPATIENT
Start: 2024-11-19

## 2024-11-19 RX ORDER — LAMOTRIGINE 100 MG/1
100 TABLET ORAL 2 TIMES DAILY
Qty: 60 TABLET | Refills: 0 | OUTPATIENT
Start: 2024-11-19

## 2024-11-19 RX ORDER — ZONISAMIDE 100 MG/1
CAPSULE ORAL
Qty: 180 CAPSULE | Refills: 0 | Status: SHIPPED | OUTPATIENT
Start: 2024-11-19

## 2024-11-19 RX ORDER — LAMOTRIGINE 100 MG/1
100 TABLET ORAL 2 TIMES DAILY
Qty: 60 TABLET | Refills: 5 | Status: SHIPPED | OUTPATIENT
Start: 2024-11-19

## 2024-11-19 RX ORDER — LAMOTRIGINE 200 MG/1
200 TABLET ORAL 2 TIMES DAILY
Qty: 60 TABLET | Refills: 0 | OUTPATIENT
Start: 2024-11-19

## 2024-12-20 DIAGNOSIS — G40.919 INTRACTABLE EPILEPSY WITHOUT STATUS EPILEPTICUS, UNSPECIFIED EPILEPSY TYPE (HCC): ICD-10-CM

## 2024-12-20 RX ORDER — ZONISAMIDE 100 MG/1
CAPSULE ORAL
Qty: 180 CAPSULE | Refills: 0 | OUTPATIENT
Start: 2024-12-20

## 2024-12-20 RX ORDER — FOLIC ACID 1 MG/1
1 TABLET ORAL DAILY
Qty: 30 TABLET | Refills: 0 | OUTPATIENT
Start: 2024-12-20

## 2024-12-20 RX ORDER — ZONISAMIDE 100 MG/1
CAPSULE ORAL
Qty: 180 CAPSULE | Refills: 0 | Status: SHIPPED | OUTPATIENT
Start: 2024-12-20

## 2024-12-20 RX ORDER — FOLIC ACID 1 MG/1
1 TABLET ORAL DAILY
Qty: 30 TABLET | Refills: 0 | Status: SHIPPED | OUTPATIENT
Start: 2024-12-20

## 2024-12-20 NOTE — TELEPHONE ENCOUNTER
Reason for call:   [x] Refill   [] Prior Auth  [] Other:     Office:   [] PCP/Provider -   [x] Specialty/Provider - neuro? Marry Rascon PA-C    Medication: folic acid (FOLVITE) 1 mg tablet     Dose/Frequency:   1 mg, Oral, Daily       Quantity: 30    Pharmacy: [facility]/pharmacy #[id number] - [place] - [address] [place] [address]     Does the patient have enough for 3 days?   [x] Yes   [] No - Send as HP to POD

## 2025-01-17 ENCOUNTER — TELEPHONE (OUTPATIENT)
Dept: NEUROLOGY | Facility: CLINIC | Age: 35
End: 2025-01-17

## 2025-01-17 NOTE — TELEPHONE ENCOUNTER
I called and left a voicemail message reminding the patient of there upcoming appointment with Marry Rascon PA-C on 1/30 @ 9:30 am in the [place] Office. I requested a call back to our office to confirm the appointment or if the patient has any issues or concerns or cannot keep this appointment.

## 2025-01-18 DIAGNOSIS — G40.919 INTRACTABLE EPILEPSY WITHOUT STATUS EPILEPTICUS, UNSPECIFIED EPILEPSY TYPE (HCC): ICD-10-CM

## 2025-01-20 RX ORDER — FOLIC ACID 1 MG/1
1 TABLET ORAL DAILY
Qty: 30 TABLET | Refills: 0 | Status: SHIPPED | OUTPATIENT
Start: 2025-01-20

## 2025-01-20 RX ORDER — ZONISAMIDE 100 MG/1
CAPSULE ORAL
Qty: 180 CAPSULE | Refills: 0 | Status: SHIPPED | OUTPATIENT
Start: 2025-01-20

## 2025-02-21 DIAGNOSIS — G40.919 INTRACTABLE EPILEPSY WITHOUT STATUS EPILEPTICUS, UNSPECIFIED EPILEPSY TYPE (HCC): ICD-10-CM

## 2025-02-21 RX ORDER — FOLIC ACID 1 MG/1
1 TABLET ORAL DAILY
Qty: 30 TABLET | Refills: 0 | Status: SHIPPED | OUTPATIENT
Start: 2025-02-21

## 2025-02-21 RX ORDER — ZONISAMIDE 100 MG/1
CAPSULE ORAL
Qty: 180 CAPSULE | Refills: 0 | Status: SHIPPED | OUTPATIENT
Start: 2025-02-21

## 2025-02-26 ENCOUNTER — OFFICE VISIT (OUTPATIENT)
Dept: NEUROLOGY | Facility: CLINIC | Age: 35
End: 2025-02-26

## 2025-02-26 VITALS
BODY MASS INDEX: 30.14 KG/M2 | HEART RATE: 82 BPM | HEIGHT: 62 IN | RESPIRATION RATE: 18 BRPM | DIASTOLIC BLOOD PRESSURE: 80 MMHG | SYSTOLIC BLOOD PRESSURE: 120 MMHG | TEMPERATURE: 97.1 F | WEIGHT: 163.8 LBS | OXYGEN SATURATION: 99 %

## 2025-02-26 DIAGNOSIS — G40.919 INTRACTABLE EPILEPSY WITHOUT STATUS EPILEPTICUS, UNSPECIFIED EPILEPSY TYPE (HCC): Primary | ICD-10-CM

## 2025-02-26 PROCEDURE — 99214 OFFICE O/P EST MOD 30 MIN: CPT | Performed by: PHYSICIAN ASSISTANT

## 2025-02-26 NOTE — PATIENT INSTRUCTIONS
Plan:   1 - continue with zonisamide 100mg cap take three caps twice a day  2 - continue with lamotrigine 300mg twice a day (take a 100mg and a 200mg tab together twice a day)  3 - check lamotrigine, zonisamide, CBC, and CMP when able  4 - please bring your medications to the office visit  5 - please continue to monitor the frequency of your seizures with a seizure calendar; bring your seizure calendar to the office  6 - please continue with folic acid 1mg daily  7 - continue with vitamin D 2000 units once a day  8 - follow-up in 3 months with Advanced Practitioner

## 2025-02-26 NOTE — PROGRESS NOTES
Name: Perlita Inman      : 1990      MRN: [id number]  Encounter Provider: Marry Rascon PA-C  Encounter Date: 2025   Encounter department: [facility] [place]  :  Assessment & Plan  Intractable epilepsy without status epilepticus, unspecified epilepsy type (HCC)  Ms. Perlita Inman is a 34 y.o. woman with intractable focal epilepsy and intellectual disability. Her seizures are suspected to be temporal in origin but she may have bilateral involvement. Her EEG studies suggest predominantly left hemispheric/temporal seizure onset but there is a high probability of right temporal early involvement. MRI brain study is normal; however, PET/CT brain study also indicates left more the right decreased metabolism. Assessing seizure frequency is difficult, as she is generally amnestic to her seizures. Despite transition to lamotrigine and zonisamide, she continues to have episodic seizures, unclear if there is an improvement in seizure reduction.     She has not had insurance and has not been able to get any blood work done.  I would really like her to get blood work done before making changes to her medications.  Her sister believes they will be able to get labs done.  If there is room to increase lamotrigine, we can do that, and if not, or if seizures continue, would add a third medication.  Not having insurance will make this difficult due to cost.  Could consider Fycompa.      Due to the presence of bilateral, likely mesial temporal seizure onset, she is not a candidate for direct resective surgery. She is a candidate for neuromodulatory devices such as VNS, DBS for epilepsy and possibly RNS. She has declined any surgical intervention, including the VNS therapy. These devices may not necessarily cure an individual of seizures but do help reduce her risk of SUDEP. Reviewed again today.     Plan:   1 - continue with zonisamide 100mg cap take three caps twice  a day  2 - continue with lamotrigine 300mg twice a day (take a 100mg and a 200mg tab together twice a day)  3 - check lamotrigine, zonisamide, CBC, and CMP when able  4 - please bring your medications to the office visit  5 - please continue to monitor the frequency of your seizures with a seizure calendar; bring your seizure calendar to the office  6 - please continue with folic acid 1mg daily  7 - continue with vitamin D 2000 units once a day  8 - follow-up in 3 months with Advanced Practitioner             History of Present Illness   HPI   Perlita Inman is a 34 y.o. right handed female here for follow-up evaluation of intractable epilepsy.     Interval History 2/26/2025  She was last seen by Dr. Ureña on 10/27/23, over a year ago.  she continued with intermittent seizures.  Patient and family still resistant to considering epilepsy surgery or implantable devices such as VNS.  Lamotrigine was increased.      She is here with Claudia, who is her sister who comes to the office frequently with Perlita.  Patient has not had insurance.  They have been paying for medications out of pocket.  She has not been able to get any blood work done due to not having insurance.   She continues to have intermittent seizures a few times a month.  3 weeks ago she had 5 seizures back to back, did not seek medical attention.  Unresponsive with behavioral arrest.     AED/side effects/compliance:  Lamotrigine 300-300  Zonisamide 300-300     Event/Seizure semiology:  She starts to make a blowing sound, rigid, behavioral arrest, unable to speak, hand tremors, drops things from hands, then she cannot remember what happened (amnestic to seizure)  One lifetime convulsion in 2012?  FIAS - videos from mother 9/10/2021 - she is quiet, random head movements, does not answer questions, but no consistent behaviors other than being quiet. (similar to Seizure type 1)     Woman of childbearing age with Epilepsy:  Contraception: not  "sexually active  Folic acid supplement: Yes     Prior Epilepsy History:  Intake History 10/22/2019  She was previously seen by Dr. Child at [facility]  From Dr. Child's office note:  Seizure type 1 - behavioral arrest, tremoulous, unresponsive, amnestic to seizure, ?masticatory movements, blowing air type of sounds  Seizure type 2 - generalized tonic clonic seizure out of sleep  Patient is from the [place]. She was diagnosed with seizures at an early age in childhood; initially there were staring episodes; mother was unable to report name of medications that were tried. She had generalized convulsions starting at the age of 21. She was initially on Tegretol.  Her last visit with Dr. Child was on 6/27/2019. She continued to have \"a few attacks\". After a seizure she will have a headache.     Patient's history: obtained using  by telephone  Perlita is here with her sister. I started to have Perlita give her own history regarding seizures but she was struggling with the telephone  (not sure how to respond to questions). She looked to her sister for information. Her sister reports that Perlita started to have seizures about 11 years ago (age 13-14, not during early childhood). Perlita has no recollection of when she has a seizure. She did not have seizures as a baby. When she was a child she was \"slow\" ( used \"hidden condition\"). When she was born, there was a problem during birth, the doctor was pushing or pulling during the delivery and there was something in the brain that got \"disconnected\".     Her sister reports that Perlita seizures involve Perlita appearing paralyzed, unable to speak, with hand tremors, rigid and stiff, and forgets what she is doing. If she has something in her hands she just drops it. This seizure can be about a minute and may repeat 3 times within 5 minutes. Perlita has a warning of making a blowing sound with her mouth. Her last seizure was last night. She " has seizures about a couple of times a week.  There was one seizure that caused her to go into a convulsion (this happened 7 years ago).   She started a new job last week, she could not work because she had a seizure there. She could not hold a job because she had seizures at work.      Summary 2020  -800, PB 90 qHS. When Perlita has a seizure she is standing, appearing like she zoned out. The family does not keep track of the seizure frequency. Prior to the last visit with me, she would have about 8 seizures a month. Caroline thinks that Perlita may have been on carbamazepine longer than phenobarbital. We added lamotrigine and started to reduce her dose of carbamazepine. Caroline, her sister, accompanies Perlita to her office visits (as assist with English). She continued to have seizures. These seizures are still consistent with the Type 1, behavioral arrest and unable to speak.      Summary 2021  CBZ--400, PB 90, -150. She comes with her sister (Claudia). Perlita does not know when she is having a seizure. She lives with her mother and a sister (Caroline). Seizures consist of altered awareness and amnesia, she is confused.  It is difficult to determine the frequency of her seizures (she is not always watched). She has difficulty remembering things (she gets confused). We weaned off of carbamazepine and increased lamotrigine.  There continues to be at least 3 seizures per month.  Her mother shows me videos of Perlita being quiet, but not having similar repetitive movements, she is quiet looking around, her mother will call her name and ask her what is she doing or where is she but Perlita is confused and does not answer. She has these episodes at work and her boss would notice that she is inattentive/confused and dismissed her from work. She completed EMU study in October 2021, found to have bilateral temporal involvement, left epileptiform discharges more frequent than right. However, a few of the clinical  "seizures were obscured by blanket, happening in the bathroom, or when EEG electrodes were being fixed.      Summary 2022  PB 97.2, -250. After her EMU admission, she was supposed to be on lamotrigine 300mg twice a day; but she only has been on -250 and phenobarbital 97.2 at bedtime. She is unaware of when she has a seizure. She at least has a seizure 1-3 times a month as reported by her family.  There have been no convulsion; she has periods of confusion and altered awareness.  We started to wean her off of phenobarbital to transitions her to zonisamide and higher dose of lamotrigine. By May 2022, there is about one seizure a month.   We tried to have her speak to the VNS representative to discuss VNS therapy. Perlita is not interested in surgical intervention. She does not understand English, so she cannot complete neuropsychological testing.      Interval History 1/20/2023  PB 32.4, -250, -200. She is here with Shree Murillo, her brother in law,  to Claudia.   She brings a seizure calendar, she had seizures on 10/14/2023, 12/3/2023 x 3, 12/19/2023 x 2 (confused), 12/31/2022. There are no convulsive seizures; she appears confused.  She continues to not want to pursue any surgical intervention for her epilepsy. She does not want the VNS therapy.  She is unable to ready any basic form of English, she did not recognize \"We\", \"American\", \"do you read\". She only recognizes colors, but she is unable to say \"black\" or \"white\". Last time she wrote anything in English was when she was in school in the .      Interval History 10/27/2023  She was last seen by Marry Rascon on 7/28/2023 - she continued to have recurrent seizures, she did not bring her seizure calendar. Her seizure continued to be lapses in awareness. Her zonisamide was increased to 300-300. She had weaned off of phenobarbital.     She is here with Claudia, who is her sister who comes to the office frequently with " Perlita.  Since her last visit, Claudia can only remember 2 seizures that Perlita had (on  and 10/25). On , she had a seizure for 2 hours. Perlita helps take care of Claudia's son who is 2 years old. When Claudia got home from work, she noticed that Perlita is not her self (there Is no longer the blowing sound), looking out of it, she was not able to talk, wrong answers, and didn't realize that Claudia's son had a cast for 2 week. Claudia kept asking Perlita questions and kept getting inappropriate answers. The confusion lasted about an hour.   On 10/25, Perlita's mother reported to Claudia that Perlita not acting her self, looking at her hands, confused.      Special Features  Status epilepticus: No  Self Injury Seizures: No  Precipitating Factors: None  Post-ictal state: amnestic, confusion     Epilepsy Risk Factors:  Abnormal pregnancy: No  Abnormal birth/: No  Abnormal Development: No  Febrile seizures, simple: No  Febrile seizures, complex: No  CNS infection: No  Mental retardation: No; she was told that she had a learning disability when she was in school; she needed tutors and additional help.  Cerebral palsy: No  Head injury (moderate/severe): No  CNS neoplasm: No  CNS malformation: No  Neurosurgical procedure: No  Stroke: No  Alcohol abuse: No  Drug abuse: No  Family history Sz/epilepsy: No     Prior AEDs:  medication Reason to stop   Levetiracetam ?behavioral problem   phenobarbital Continued to have seizures   carbamazepine     oxcarbazepine     lamotrigine     zonisamide        Prior workup:  x  Imagin2016  MRI brain - [facility]  Normal MR of brain      2019  MRI brain w/wo  Normal MRI brain study - No pathology was identified in the left frontal or temporal regions.     2021 - PET/CT study  Left temporal hypometabolism; also reduced on the right temporal region     EEGs:  2015 - [facility]  Multifocal independent spike-slow waves with phase reversal over the left temporal region T3, T5,  Fp1, Fp2, F3 along with generalized spike-slow waves at 1 Hz runs.  There are small sharp waves in the bilateral frontal region     7/11-7/13/2016 - [facility]  48 hours ambulatory EEG  Generalized spike and waves and polyspike and waves  There is an electrographic seizure but no symptoms were reported  7/11/2016 - 11:17 - generalized bifrontal spike-wave complexes with generalized 7-8 Hz activity     3/5/2020  Poorly organized theta/delta slowing and sharply contoured waveforms on the left temporal area  There seems to bifrontal triphasic waves; but on my interpretation of the EEG there are left frontotemporal spike-slow waves.     9/9-9/11/2020 - 48 hours ambulatory EEG  Frequent left temporal delta/theta activity with left midtemporal sharp waves.  Intermittent delta activity, sharp waves over the left frontal anterior temporal region.     10/20/2021 - EMU admission  Summary interictal findings:   - left temporal/frontotemporal sharp waves (more frequent during sleep)  - left temporal focal slowing  - right temporal delta activity and suspicious very low voltage spikes (starting on day 4 of admission)  - occasional bitemporal independent irregular delta activity  - slow PDR     Summary event findings:   - She had one seizure captured fully - FIAS left temporal onset, but not well visualized  - She had one seizure the bathroom (suspect that she was confused and taken off some EEG electrodes)- similar left temporal onset, irregular delta activity, but not progressing to rhythmic discharges  - She had one FIAS while EEG leads were being fixed (cannot lateralize)  - She had one seizure during sleep (covered by blanket)- initial rhythmic spikes were over the right temporal region, stopped followed by recurrent rhythmic beta activity over the right hemisphere  - One seizure from sleep with suspected clonic activity (covered by blanket) - (bilateral involvement) there is rhythmic sharp alpha activity over T4, extremely low  voltage gamma-beta activity over T3, then there is rhythmic delta activity over the left temporal region    Seizure Classification: focal impaired awareness seizure  Epilepsy Classification: intractable focal epilepsy    11/29/2023 routine EEG  Frequent left temporal polymorphic delta slowing, occasional left mid temporal sharp waves and rare left frontotemporal sharps waves suggest left temporal focal neuronal dysfunction with epileptogenic potential.     12/11/23-12/14/23 72hr ambulatory EEG  Frequent left posterior temporal sharp wave discharges, which occurred during drowsiness and sleep, and occasionally occurred in brief semi-rhythmic runs, are a potential source of seizures. Intermittent left temporal delta activities and less frequently seen right temporal delta activities suggest independent areas of neuronal dysfunction in the left temporal and possibly right temporal regions. Mild slowing of the posteriorly dominant alpha rhythm suggests mild diffuse cerebral dysfunction of nonspecific etiology. No clinical events were reported. No electrographic seizures were seen.      She is not going to be able to complete a neuropsychology evaluation due to [place] as her only language.    Review of Systems   Constitutional: Negative.  Negative for fatigue and fever.   HENT: Negative.  Negative for hearing loss, tinnitus and trouble swallowing.    Eyes:  Negative for photophobia, pain and visual disturbance.   Respiratory: Negative.  Negative for cough and shortness of breath.    Cardiovascular: Negative.  Negative for chest pain and palpitations.   Gastrointestinal:  Negative for constipation, diarrhea, nausea and vomiting.   Endocrine: Negative.    Genitourinary: Negative.  Negative for difficulty urinating and urgency.   Musculoskeletal: Negative.  Negative for back pain, gait problem and neck pain.   Skin: Negative.  Negative for rash.   Neurological:  Positive for seizures. Negative for dizziness, tremors,  "syncope, speech difficulty, weakness, numbness and headaches.   Hematological: Negative.    Psychiatric/Behavioral:  Negative for decreased concentration and sleep disturbance. The patient is not nervous/anxious.        Current Outpatient Medications on File Prior to Visit   Medication Sig Dispense Refill    Cholecalciferol (Vitamin D3) 50 MCG (2000 UT) capsule Take 1 capsule (2,000 Units total) by mouth daily 30 capsule 5    folic acid (FOLVITE) 1 mg tablet Take 1 tablet (1 mg total) by mouth daily 30 tablet 0    lamoTRIgine (LaMICtal) 100 mg tablet TAKE 1 TABLET (100 MG TOTAL) BY MOUTH 2 (TWO) TIMES A DAY WITH ONE 200MG TAB 60 tablet 5    lamoTRIgine (LaMICtal) 200 MG tablet TAKE 1 TABLET (200 MG TOTAL) BY MOUTH 2 (TWO) TIMES A DAY WITH ONE 100MG TAB 60 tablet 5    zonisamide (ZONEGRAN) 100 mg capsule Take three capsules by mouth twice a day 180 capsule 0    cetirizine (ZyrTEC) 10 mg tablet        No current facility-administered medications on file prior to visit.         Objective   Resp 18   Ht 5' 2\" (1.575 m)   BMI 32.37 kg/m²     Physical Exam  Constitutional:       Appearance: Normal appearance.     Eyes:      Extraocular Movements: EOM normal.      Pupils: Pupils are equal, round, and reactive to light.       Neurological:      Mental Status: She is alert.      Motor: Motor strength is normal.    Psychiatric:         Mood and Affect: Mood normal.         Speech: Speech normal.         Behavior: Behavior normal.       Neurological Exam  Mental Status  Alert. Oriented to person, place, time and situation. Speech is normal. Language is fluent with no aphasia. Attention and concentration are normal.    Cranial Nerves  CN II: Visual fields full to confrontation.  CN III, IV, VI: Extraocular movements intact bilaterally. Pupils equal round and reactive to light bilaterally.  CN V: Facial sensation is normal.  CN VII: Full and symmetric facial movement.  CN VIII: Hearing is normal.  CN IX, X: Palate elevates " symmetrically  CN XI: Shoulder shrug strength is normal.  CN XII: Tongue midline without atrophy or fasciculations.    Motor   Normal muscle tone. Strength is 5/5 throughout all four extremities.    Sensory  Light touch is normal in upper and lower extremities.     Coordination  Right: Finger-to-nose normal.Left: Finger-to-nose normal.    Gait  Casual gait is normal including stance, stride, and arm swing.

## 2025-02-26 NOTE — ASSESSMENT & PLAN NOTE
Ms. Perlita Inman is a 34 y.o. woman with intractable focal epilepsy and intellectual disability. Her seizures are suspected to be temporal in origin but she may have bilateral involvement. Her EEG studies suggest predominantly left hemispheric/temporal seizure onset but there is a high probability of right temporal early involvement. MRI brain study is normal; however, PET/CT brain study also indicates left more the right decreased metabolism. Assessing seizure frequency is difficult, as she is generally amnestic to her seizures. Despite transition to lamotrigine and zonisamide, she continues to have episodic seizures, unclear if there is an improvement in seizure reduction.     She has not had insurance and has not been able to get any blood work done.  I would really like her to get blood work done before making changes to her medications.  Her sister believes they will be able to get labs done.  If there is room to increase lamotrigine, we can do that, and if not, or if seizures continue, would add a third medication.  Not having insurance will make this difficult due to cost.  Could consider Fycompa.      Due to the presence of bilateral, likely mesial temporal seizure onset, she is not a candidate for direct resective surgery. She is a candidate for neuromodulatory devices such as VNS, DBS for epilepsy and possibly RNS. She has declined any surgical intervention, including the VNS therapy. These devices may not necessarily cure an individual of seizures but do help reduce her risk of SUDEP. Reviewed again today.     Plan:   1 - continue with zonisamide 100mg cap take three caps twice a day  2 - continue with lamotrigine 300mg twice a day (take a 100mg and a 200mg tab together twice a day)  3 - check lamotrigine, zonisamide, CBC, and CMP when able  4 - please bring your medications to the office visit  5 - please continue to monitor the frequency of your seizures with a seizure calendar; bring  your seizure calendar to the office  6 - please continue with folic acid 1mg daily  7 - continue with vitamin D 2000 units once a day  8 - follow-up in 3 months with Advanced Practitioner

## 2025-03-23 DIAGNOSIS — G40.919 INTRACTABLE EPILEPSY WITHOUT STATUS EPILEPTICUS, UNSPECIFIED EPILEPSY TYPE (HCC): ICD-10-CM

## 2025-03-24 RX ORDER — ZONISAMIDE 100 MG/1
300 CAPSULE ORAL 2 TIMES DAILY
Qty: 180 CAPSULE | Refills: 5 | Status: SHIPPED | OUTPATIENT
Start: 2025-03-24

## 2025-03-24 RX ORDER — ZONISAMIDE 100 MG/1
CAPSULE ORAL
Qty: 180 CAPSULE | Refills: 0 | OUTPATIENT
Start: 2025-03-24

## 2025-05-20 DIAGNOSIS — G40.919 INTRACTABLE EPILEPSY WITHOUT STATUS EPILEPTICUS, UNSPECIFIED EPILEPSY TYPE (HCC): ICD-10-CM

## 2025-05-20 NOTE — TELEPHONE ENCOUNTER
Reason for call:   [x] Refill   [] Prior Auth  [] Other:     Office:   [] PCP/Provider -   [x] Specialty/Provider - [place]  [place]  Authorized By: Marry Rascon PA-C      Medication: lamoTRIgine (LaMICtal) 200 MG tablet    Dose/Frequency: TAKE 1 TABLET (200 MG TOTAL) BY MOUTH 2 (TWO) TIMES A DAY WITH ONE 100MG TAB    Quantity: 60    Pharmacy: [facility]/pharmacy #[id number] [address] [facility]   Does the patient have enough for 3 days?   [x] Yes   [] No - Send as HP to POD    Mail Away Pharmacy   Does the patient have enough for 10 days?   [] Yes   [] No - Send as HP to POD    Reason for call:   [x] Refill   [] Prior Auth  [] Other:     Office:   [] PCP/Provider -   [x] Specialty/Provider -     Medication: lamoTRIgine (LaMICtal) 100 mg tablet    Dose/Frequency:  TAKE 1 TABLET (100 MG TOTAL) BY MOUTH 2 (TWO) TIMES A DAY WITH ONE 200MG TAB    Quantity: 60    Pharmacy: [facility]/pharmacy #[id number] [facility] [address] [facility]   Does the patient have enough for 3 days?   [x] Yes   [] No - Send as HP to POD    Mail Away Pharmacy   Does the patient have enough for 10 days?   [] Yes   [] No - Send as HP to POD

## 2025-05-22 RX ORDER — LAMOTRIGINE 200 MG/1
200 TABLET ORAL 2 TIMES DAILY
Qty: 60 TABLET | Refills: 5 | Status: SHIPPED | OUTPATIENT
Start: 2025-05-22

## 2025-05-22 RX ORDER — LAMOTRIGINE 100 MG/1
100 TABLET ORAL 2 TIMES DAILY
Qty: 60 TABLET | Refills: 5 | Status: SHIPPED | OUTPATIENT
Start: 2025-05-22

## 2025-07-21 ENCOUNTER — TELEPHONE (OUTPATIENT)
Age: 35
End: 2025-07-21

## 2025-07-21 NOTE — TELEPHONE ENCOUNTER
If the fatigue is new and there have not been any adjustments to her AEDs, I am not sure why that would happen all of a sudden. Possible that her working now is adding to fatigue.  Medication levels (blood work) would be helpful.  She should be taking her medications as prescribed to get full efficacy of the medication in order to work appropriately and prevent seizures.  It would be very helpful to keep a log/calendar of seizure frequency, as that helps us know if medications are working.  She should be sure to get her blood work done prior to appt with Dr. Ureña

## 2025-07-21 NOTE — TELEPHONE ENCOUNTER
Pt sister, Claudia called. There is no updated Medical Communication Consent form for pt, but it is okay to speak with Claudia for information gathering purposes.     Claudia reports that pt has no insurance and pays out of pocket for her blood work, medications and appointments. Pt is now employed, but unable to obtain insurance. Pt reported to her sister that some mornings she does not take her medications because she wakes up very sleepy and finds it difficult to function at work. She thinks her medication is making her tired. Claudia reports this is new, as pt has tolerated her medications previously. Claudia reports that pt should be taking zonisamide 300 mg BID and lamotrigine 300 mg BID. Claudia wanted to make the provider aware of the issue with noncompliance. Discussed with Claudia the labs that have been ordered during the last OV. She stated that she is aware of the bloodwork, and will remind pt to have these done. Asked Claudia if pt has been having seizures, and she stated that she is, but is not sure how often they are occurring. Claudia does not live with the pt, but has been told by a co-worker that occasionally she has been having seizures at work. Pt is no longer keeping a calendar of seizure activity. Advised that pt start doing this. Next OV is scheduled for 9/12/25 with Dr. Ureña. This is the soonest available appointment. Appointments were canceled on 5/28/25, 7/16/25, and 7/18/25.  Pt will need to be contacted at # [phone] with any advisement.

## 2025-07-23 NOTE — TELEPHONE ENCOUNTER
Contacted pt utilizing the [employer]  Line; ID# [id number]. Unable to get a hold of pt and no voicemail set up to leave a message.